# Patient Record
Sex: FEMALE | Race: WHITE | Employment: FULL TIME | URBAN - METROPOLITAN AREA
[De-identification: names, ages, dates, MRNs, and addresses within clinical notes are randomized per-mention and may not be internally consistent; named-entity substitution may affect disease eponyms.]

---

## 2017-01-18 ENCOUNTER — OFFICE VISIT (OUTPATIENT)
Dept: FAMILY MEDICINE CLINIC | Age: 57
End: 2017-01-18

## 2017-01-18 VITALS
HEIGHT: 69 IN | TEMPERATURE: 97.8 F | BODY MASS INDEX: 38.78 KG/M2 | WEIGHT: 261.8 LBS | DIASTOLIC BLOOD PRESSURE: 84 MMHG | HEART RATE: 68 BPM | SYSTOLIC BLOOD PRESSURE: 136 MMHG

## 2017-01-18 DIAGNOSIS — R10.30 LOWER ABDOMINAL PAIN: Primary | ICD-10-CM

## 2017-01-18 LAB
BILIRUBIN, POC: NORMAL
BLOOD URINE, POC: NORMAL
CLARITY, POC: CLEAR
COLOR, POC: YELLOW
GLUCOSE URINE, POC: NORMAL
KETONES, POC: NORMAL
LEUKOCYTE EST, POC: NORMAL
NITRITE, POC: NORMAL
PH, POC: 7
PROTEIN, POC: NORMAL
SPECIFIC GRAVITY, POC: 1.01
UROBILINOGEN, POC: 0.2

## 2017-01-18 PROCEDURE — 99213 OFFICE O/P EST LOW 20 MIN: CPT | Performed by: FAMILY MEDICINE

## 2017-01-18 PROCEDURE — 81002 URINALYSIS NONAUTO W/O SCOPE: CPT | Performed by: FAMILY MEDICINE

## 2017-01-18 RX ORDER — AMOXICILLIN AND CLAVULANATE POTASSIUM 875; 125 MG/1; MG/1
1 TABLET, FILM COATED ORAL 2 TIMES DAILY
Qty: 20 TABLET | Refills: 0 | Status: SHIPPED | OUTPATIENT
Start: 2017-01-18 | End: 2017-02-02 | Stop reason: SDUPTHER

## 2017-01-18 ASSESSMENT — ENCOUNTER SYMPTOMS
ABDOMINAL PAIN: 1
SHORTNESS OF BREATH: 0
EYE PAIN: 0
VOMITING: 0
NAUSEA: 0

## 2017-01-24 ENCOUNTER — TELEPHONE (OUTPATIENT)
Dept: FAMILY MEDICINE CLINIC | Age: 57
End: 2017-01-24

## 2017-02-02 ENCOUNTER — OFFICE VISIT (OUTPATIENT)
Dept: FAMILY MEDICINE CLINIC | Age: 57
End: 2017-02-02

## 2017-02-02 VITALS
HEIGHT: 69 IN | HEART RATE: 76 BPM | TEMPERATURE: 97.8 F | SYSTOLIC BLOOD PRESSURE: 132 MMHG | WEIGHT: 257.2 LBS | BODY MASS INDEX: 38.09 KG/M2 | DIASTOLIC BLOOD PRESSURE: 82 MMHG

## 2017-02-02 DIAGNOSIS — K57.32 DIVERTICULITIS OF LARGE INTESTINE WITHOUT PERFORATION OR ABSCESS WITHOUT BLEEDING: Primary | ICD-10-CM

## 2017-02-02 PROCEDURE — 99212 OFFICE O/P EST SF 10 MIN: CPT | Performed by: FAMILY MEDICINE

## 2017-02-02 RX ORDER — AMOXICILLIN AND CLAVULANATE POTASSIUM 875; 125 MG/1; MG/1
1 TABLET, FILM COATED ORAL 2 TIMES DAILY
Qty: 20 TABLET | Refills: 0 | Status: SHIPPED | OUTPATIENT
Start: 2017-02-02 | End: 2017-02-12

## 2017-02-02 ASSESSMENT — ENCOUNTER SYMPTOMS
VOMITING: 0
ABDOMINAL PAIN: 0
EYE PAIN: 0
SHORTNESS OF BREATH: 0
NAUSEA: 0

## 2017-03-20 RX ORDER — LISINOPRIL AND HYDROCHLOROTHIAZIDE 12.5; 1 MG/1; MG/1
TABLET ORAL
Qty: 180 TABLET | Refills: 0 | Status: SHIPPED | OUTPATIENT
Start: 2017-03-20 | End: 2017-06-16 | Stop reason: SDUPTHER

## 2017-04-19 ENCOUNTER — TELEPHONE (OUTPATIENT)
Dept: FAMILY MEDICINE CLINIC | Age: 57
End: 2017-04-19

## 2017-04-25 ENCOUNTER — OFFICE VISIT (OUTPATIENT)
Dept: FAMILY MEDICINE CLINIC | Age: 57
End: 2017-04-25

## 2017-04-25 ENCOUNTER — HOSPITAL ENCOUNTER (OUTPATIENT)
Dept: NON INVASIVE DIAGNOSTICS | Age: 57
Discharge: HOME | End: 2017-04-25
Attending: FAMILY MEDICINE | Admitting: FAMILY MEDICINE

## 2017-04-25 VITALS
HEART RATE: 72 BPM | SYSTOLIC BLOOD PRESSURE: 126 MMHG | TEMPERATURE: 98.4 F | BODY MASS INDEX: 38.36 KG/M2 | DIASTOLIC BLOOD PRESSURE: 80 MMHG | HEIGHT: 69 IN | WEIGHT: 259 LBS

## 2017-04-25 DIAGNOSIS — R05.9 COUGH: ICD-10-CM

## 2017-04-25 DIAGNOSIS — J40 BRONCHITIS: Primary | ICD-10-CM

## 2017-04-25 DIAGNOSIS — R50.9 FEELS FEVERISH: ICD-10-CM

## 2017-04-25 LAB
BASOPHILS ABSOLUTE: 0.1 K/UL (ref 0–0.2)
BASOPHILS RELATIVE PERCENT: 1.1 %
EOSINOPHILS ABSOLUTE: 0.2 K/UL (ref 0–0.6)
EOSINOPHILS RELATIVE PERCENT: 2 %
HCT VFR BLD CALC: 43.6 % (ref 36–48)
HEMOGLOBIN: 14.4 G/DL (ref 12–16)
LYMPHOCYTES ABSOLUTE: 3 K/UL (ref 1–5.1)
LYMPHOCYTES RELATIVE PERCENT: 27.6 %
MCH RBC QN AUTO: 29.8 PG (ref 26–34)
MCHC RBC AUTO-ENTMCNC: 33.1 G/DL (ref 31–36)
MCV RBC AUTO: 90 FL (ref 80–100)
MONOCYTES ABSOLUTE: 0.6 K/UL (ref 0–1.3)
MONOCYTES RELATIVE PERCENT: 5.6 %
NEUTROPHILS ABSOLUTE: 7 K/UL (ref 1.7–7.7)
NEUTROPHILS RELATIVE PERCENT: 63.7 %
PDW BLD-RTO: 14 % (ref 12.4–15.4)
PLATELET # BLD: 341 K/UL (ref 135–450)
PMV BLD AUTO: 8.1 FL (ref 5–10.5)
RBC # BLD: 4.84 M/UL (ref 4–5.2)
WBC # BLD: 11 K/UL (ref 4–11)

## 2017-04-25 PROCEDURE — 99213 OFFICE O/P EST LOW 20 MIN: CPT | Performed by: FAMILY MEDICINE

## 2017-04-25 ASSESSMENT — ENCOUNTER SYMPTOMS
COUGH: 0
WHEEZING: 0
RHINORRHEA: 1
CHEST TIGHTNESS: 0
SHORTNESS OF BREATH: 0
SINUS PRESSURE: 1

## 2017-04-26 LAB — SEDIMENTATION RATE, ERYTHROCYTE: 23 MM/HR (ref 0–30)

## 2017-04-27 DIAGNOSIS — R05.9 COUGH: ICD-10-CM

## 2017-04-27 DIAGNOSIS — R50.9 FEVER, UNSPECIFIED FEVER CAUSE: Primary | ICD-10-CM

## 2017-05-10 DIAGNOSIS — R05.9 COUGH: ICD-10-CM

## 2017-05-10 DIAGNOSIS — R50.9 FEVER, UNSPECIFIED FEVER CAUSE: ICD-10-CM

## 2017-05-15 LAB — MISCELLANEOUS LAB TEST ORDER: NORMAL

## 2017-07-26 ENCOUNTER — HOSPITAL ENCOUNTER (OUTPATIENT)
Dept: WOMENS IMAGING | Age: 57
Discharge: HOME | End: 2017-07-26
Attending: OBSTETRICS & GYNECOLOGY | Admitting: OBSTETRICS & GYNECOLOGY

## 2017-07-26 DIAGNOSIS — Z12.31 VISIT FOR SCREENING MAMMOGRAM: ICD-10-CM

## 2017-09-21 ENCOUNTER — TELEPHONE (OUTPATIENT)
Dept: FAMILY MEDICINE CLINIC | Age: 57
End: 2017-09-21

## 2017-09-21 DIAGNOSIS — R73.9 HYPERGLYCEMIA: ICD-10-CM

## 2017-09-21 DIAGNOSIS — E78.5 HYPERLIPIDEMIA, UNSPECIFIED HYPERLIPIDEMIA TYPE: ICD-10-CM

## 2017-09-21 DIAGNOSIS — I10 ESSENTIAL HYPERTENSION: Primary | ICD-10-CM

## 2017-11-14 DIAGNOSIS — E78.5 HYPERLIPIDEMIA, UNSPECIFIED HYPERLIPIDEMIA TYPE: ICD-10-CM

## 2017-11-14 DIAGNOSIS — R73.9 HYPERGLYCEMIA: ICD-10-CM

## 2017-11-14 DIAGNOSIS — I10 ESSENTIAL HYPERTENSION: ICD-10-CM

## 2017-11-14 LAB
ANION GAP SERPL CALCULATED.3IONS-SCNC: 13 MMOL/L (ref 3–16)
BUN BLDV-MCNC: 14 MG/DL (ref 7–20)
CALCIUM SERPL-MCNC: 9.7 MG/DL (ref 8.3–10.6)
CHLORIDE BLD-SCNC: 103 MMOL/L (ref 99–110)
CHOLESTEROL, TOTAL: 203 MG/DL (ref 0–199)
CO2: 25 MMOL/L (ref 21–32)
CREAT SERPL-MCNC: 0.6 MG/DL (ref 0.6–1.1)
GFR AFRICAN AMERICAN: >60
GFR NON-AFRICAN AMERICAN: >60
GLUCOSE BLD-MCNC: 105 MG/DL (ref 70–99)
HDLC SERPL-MCNC: 46 MG/DL (ref 40–60)
LDL CHOLESTEROL CALCULATED: 138 MG/DL
POTASSIUM SERPL-SCNC: 4.3 MMOL/L (ref 3.5–5.1)
SODIUM BLD-SCNC: 141 MMOL/L (ref 136–145)
TRIGL SERPL-MCNC: 93 MG/DL (ref 0–150)
VLDLC SERPL CALC-MCNC: 19 MG/DL

## 2017-11-15 LAB
ESTIMATED AVERAGE GLUCOSE: 99.7 MG/DL
HBA1C MFR BLD: 5.1 %

## 2017-11-20 RX ORDER — FUROSEMIDE 20 MG/1
TABLET ORAL
Qty: 30 TABLET | Refills: 2 | Status: SHIPPED | OUTPATIENT
Start: 2017-11-20 | End: 2018-03-07 | Stop reason: SDUPTHER

## 2017-12-06 ENCOUNTER — OFFICE VISIT (OUTPATIENT)
Dept: FAMILY MEDICINE CLINIC | Age: 57
End: 2017-12-06

## 2017-12-06 VITALS
SYSTOLIC BLOOD PRESSURE: 130 MMHG | HEART RATE: 64 BPM | DIASTOLIC BLOOD PRESSURE: 84 MMHG | WEIGHT: 257.2 LBS | TEMPERATURE: 98 F | BODY MASS INDEX: 38.09 KG/M2 | HEIGHT: 69 IN

## 2017-12-06 DIAGNOSIS — Z00.00 ROUTINE GENERAL MEDICAL EXAMINATION AT A HEALTH CARE FACILITY: Primary | ICD-10-CM

## 2017-12-06 DIAGNOSIS — Z11.59 NEED FOR HEPATITIS C SCREENING TEST: ICD-10-CM

## 2017-12-06 DIAGNOSIS — E78.2 MIXED HYPERLIPIDEMIA: ICD-10-CM

## 2017-12-06 DIAGNOSIS — Z11.4 ENCOUNTER FOR SCREENING FOR HIV: ICD-10-CM

## 2017-12-06 DIAGNOSIS — I10 ESSENTIAL HYPERTENSION: ICD-10-CM

## 2017-12-06 DIAGNOSIS — E03.4 HYPOTHYROIDISM DUE TO ACQUIRED ATROPHY OF THYROID: ICD-10-CM

## 2017-12-06 PROCEDURE — 99396 PREV VISIT EST AGE 40-64: CPT | Performed by: FAMILY MEDICINE

## 2017-12-06 PROCEDURE — 93000 ELECTROCARDIOGRAM COMPLETE: CPT | Performed by: FAMILY MEDICINE

## 2017-12-06 RX ORDER — LISINOPRIL AND HYDROCHLOROTHIAZIDE 12.5; 1 MG/1; MG/1
TABLET ORAL
Qty: 180 TABLET | Refills: 3 | Status: SHIPPED | OUTPATIENT
Start: 2017-12-06 | End: 2018-12-05 | Stop reason: SDUPTHER

## 2017-12-06 ASSESSMENT — ENCOUNTER SYMPTOMS
NAUSEA: 0
DIARRHEA: 0
CHEST TIGHTNESS: 0
VOMITING: 0
EYE DISCHARGE: 0
EYE PAIN: 0
ABDOMINAL PAIN: 0
SINUS PRESSURE: 0
RHINORRHEA: 0
CONSTIPATION: 0
BLOOD IN STOOL: 0
WHEEZING: 0
EYE REDNESS: 0
COUGH: 0
SHORTNESS OF BREATH: 0

## 2017-12-06 ASSESSMENT — PATIENT HEALTH QUESTIONNAIRE - PHQ9
SUM OF ALL RESPONSES TO PHQ QUESTIONS 1-9: 0
1. LITTLE INTEREST OR PLEASURE IN DOING THINGS: 0
SUM OF ALL RESPONSES TO PHQ9 QUESTIONS 1 & 2: 0
2. FEELING DOWN, DEPRESSED OR HOPELESS: 0

## 2017-12-06 NOTE — PROGRESS NOTES
Subjective:      Patient ID: Christine Philippe is a 62 y.o. female. HPI  Chief Complaint   Patient presents with    Annual Exam     no new problems     Here for CPE. Non smoker. utd on dental and vision exams  Sees gyn yrly. Had mammo  Takes meds daily  Getting spider veins treated  States little exercise.    States at times her heart flutters- hasn't had for 1 month, happened when she was overworked  Denies CP,SOB or syncope    Christine Philippe is a 62 y.o. female with the following history as recorded in [employer]:  Patient Active Problem List    Diagnosis Date Noted    Acute non-recurrent frontal sinusitis 07/29/2016    Cellulitis of finger of right hand 07/29/2016    HLD (hyperlipidemia) 09/24/2015    Routine general medical examination at a health care facility 09/24/2015    Bilateral leg edema 09/24/2015    Chronic venous hypertension with inflammation involving both sides 01/30/2015    Chronic venous insufficiency 01/30/2015    Swelling 01/30/2015    Menorrhagia 11/07/2011    Unilateral complete paralysis of vocal cord 09/01/2011    Dyspnea and respiratory abnormality 09/01/2011    Thyroid mass     GERD (gastroesophageal reflux disease)     Hypothyroidism     Allergic rhinitis     Hypertension     Dysuria 07/20/2010    Urinary tract infection 03/02/2010    Atopic rhinitis 02/04/2010    Skin disorder 07/13/2009    Perichondritis of pinna 07/13/2009    Rash 06/09/2009    Cough 05/02/2009    Disorder of function of stomach 04/22/2009    Benign neoplasm of thyroid gland 08/14/2007    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  Vitamin D (CHOLECALCIFEROL) 1000 UNITS CAPS capsule Take 1,000 Units by mouth daily. No current facility-administered medications for this visit. Allergies: Sulfa antibiotics  Past Medical History:   Diagnosis Date    Allergic rhinitis     Chronic allergic rhinitis     Dyspepsia     Hypertension     Hypothyroidism     Thyroid mass      Past Surgical History:   Procedure Laterality Date    COLONOSCOPY  2009    THROAT SURGERY  1995    VOCAL CORD SURGERY    THYROID SURGERY  7536/7032    VASCULAR SURGERY Bilateral      Family History   Problem Relation Age of Onset    Cancer Paternal Uncle      colon/liver    Cancer Maternal Grandmother      cervical    Cancer Maternal Grandfather      colon     Social History   Substance Use Topics    Smoking status: Never Smoker    Smokeless tobacco: Never Used    Alcohol use Yes      Comment: social     Vitals:    12/06/17 1029   BP: 130/84   Pulse: 64   Temp: 98 °F (36.7 °C)   TempSrc: Oral   Weight: 257 lb 3.2 oz (116.7 kg)   Height: 5' 9\" (1.753 m)     Body mass index is 37.98 kg/m².      Wt Readings from Last 3 Encounters:   12/06/17 257 lb 3.2 oz (116.7 kg)   04/25/17 259 lb (117.5 kg)   02/02/17 257 lb 3.2 oz (116.7 kg)     BP Readings from Last 3 Encounters:   12/06/17 130/84   04/25/17 126/80   02/02/17 132/82      Lab Review   Orders Only on 11/14/2017   Component Date Value    Cholesterol, Total 11/14/2017 203*    Triglycerides 11/14/2017 93     HDL 11/14/2017 46     LDL Calculated 11/14/2017 138*    VLDL CHOLESTEROL CALCULA* 11/14/2017 19     Sodium 11/14/2017 141     Potassium 11/14/2017 4.3     Chloride 11/14/2017 103     CO2 11/14/2017 25     Anion Gap 11/14/2017 13     Glucose 11/14/2017 105*    BUN 11/14/2017 14     CREATININE 11/14/2017 0.6     GFR Non- 11/14/2017 >60     GFR Procedures    HEPATITIS C ANTIBODY     Standing Status:   Future     Standing Expiration Date:   12/6/2018    HIV-1 AND HIV-2 ANTIBODIES     Standing Status:   Future     Standing Expiration Date:   12/6/2018    EKG 12 Lead     Standing Status:   Future     Number of Occurrences:   1     Standing Expiration Date:   12/6/2018     Order Specific Question:   Reason for Exam?     Answer:   Hypertension     Orders Placed This Encounter   Medications    lisinopril-hydrochlorothiazide (PRINZIDE;ZESTORETIC) 10-12.5 MG per tablet     Sig: TAKE TWO TABLETS BY MOUTH DAILY     Dispense:  180 tablet     Refill:  3

## 2017-12-06 NOTE — PATIENT INSTRUCTIONS
Please call your pharmacy if you need any refills of your medication(s). Please call our office at [phone] if you don't hear from us about your test results. Bring an accurate list of your medications with you at every appointment to ensure that we have the correct information. Our office hours are: Monday - Friday 7 am- 5 pm; Saturdays vary on doctor working.     Phone lines turn on at 8 am.

## 2018-01-11 ENCOUNTER — TELEPHONE (OUTPATIENT)
Dept: INTERNAL MEDICINE CLINIC | Age: 58
End: 2018-01-11

## 2018-01-11 NOTE — TELEPHONE ENCOUNTER
Pt was established w/ Dr Ronnie Castillo and since Dr Ronnie Castillo has moved to the [facility] AT [facility] pt wants to know if Dr Ky Bang will take her on as a new pt since her Belkys Mathews ([id number]) is currently established w/ Dr Ky Bang. Pl advise.

## 2018-01-12 ENCOUNTER — OFFICE VISIT (OUTPATIENT)
Dept: INTERNAL MEDICINE CLINIC | Age: 58
End: 2018-01-12

## 2018-01-12 VITALS
OXYGEN SATURATION: 97 % | SYSTOLIC BLOOD PRESSURE: 116 MMHG | HEART RATE: 74 BPM | DIASTOLIC BLOOD PRESSURE: 72 MMHG | BODY MASS INDEX: 38.1 KG/M2 | WEIGHT: 258 LBS | TEMPERATURE: 98.7 F

## 2018-01-12 DIAGNOSIS — K21.9 GASTROESOPHAGEAL REFLUX DISEASE WITHOUT ESOPHAGITIS: ICD-10-CM

## 2018-01-12 DIAGNOSIS — R60.0 BILATERAL LEG EDEMA: ICD-10-CM

## 2018-01-12 DIAGNOSIS — J01.10 ACUTE NON-RECURRENT FRONTAL SINUSITIS: Primary | ICD-10-CM

## 2018-01-12 PROCEDURE — 99214 OFFICE O/P EST MOD 30 MIN: CPT | Performed by: INTERNAL MEDICINE

## 2018-01-12 RX ORDER — LANSOPRAZOLE 15 MG/1
15 CAPSULE, DELAYED RELEASE ORAL DAILY
COMMUNITY

## 2018-01-12 RX ORDER — LEVOFLOXACIN 500 MG/1
500 TABLET, FILM COATED ORAL DAILY
Qty: 10 TABLET | Refills: 0 | Status: SHIPPED | OUTPATIENT
Start: 2018-01-12 | End: 2018-01-19

## 2018-01-12 RX ORDER — METHYLPREDNISOLONE 4 MG/1
TABLET ORAL
Qty: 1 KIT | Refills: 0 | Status: SHIPPED | OUTPATIENT
Start: 2018-01-12 | End: 2018-01-18

## 2018-01-12 ASSESSMENT — ENCOUNTER SYMPTOMS
SORE THROAT: 0
GASTROINTESTINAL NEGATIVE: 1
SINUS PRESSURE: 1
SWOLLEN GLANDS: 0
SHORTNESS OF BREATH: 0
COUGH: 1

## 2018-01-12 NOTE — PROGRESS NOTES
Rfl: 3  furosemide (LASIX) 20 MG tablet, TAKE ONE TABLET BY MOUTH DAILY, Disp: 30 tablet, Rfl: 2  SYNTHROID 137 MCG tablet, Take 137 mcg by mouth daily , Disp: , Rfl:   Cyanocobalamin (VITAMIN B 12 PO), Take  by mouth., Disp: , Rfl:   Vitamin D (CHOLECALCIFEROL) 1000 UNITS CAPS capsule, Take 1,000 Units by mouth daily. , Disp: , Rfl:     No current facility-administered medications for this visit. Allergies: Sulfa antibiotics  Past Medical History:  No date: Allergic rhinitis  No date: Chronic allergic rhinitis  No date: Dyspepsia  No date: Hypertension  No date: Hypothyroidism  No date: Thyroid mass  Past Surgical History:  2009: COLONOSCOPY  1995: THROAT SURGERY      Comment: VOCAL CORD SURGERY  [id number]: THYROID SURGERY  No date: VASCULAR SURGERY Bilateral  Review of patient's family history indicates:    Cancer                         Paternal Uncle              Comment: colon/liver    Cancer                         Maternal Grandmother        Comment: cervical    Cancer                         Maternal Grandfather        Comment: colon    Smoking status: Never Smoker                                                              Smokeless tobacco: Never Used                      Alcohol use: Yes              Comment: social        Sinusitis   This is a new problem. The current episode started 1 to 4 weeks ago. The problem is unchanged. There has been no fever. The pain is mild (pressure. ). Associated symptoms include congestion, coughing and sinus pressure. Pertinent negatives include no chills, diaphoresis, shortness of breath, sore throat or swollen glands. Treatments tried: augmentin but not helped. The treatment provided no relief. Review of Systems   Constitutional: Positive for fatigue. Negative for chills and diaphoresis. HENT: Positive for congestion and sinus pressure. Negative for sore throat. Respiratory: Positive for cough. Negative for shortness of breath. Cardiovascular: Negative.

## 2018-01-19 ENCOUNTER — OFFICE VISIT (OUTPATIENT)
Dept: INTERNAL MEDICINE CLINIC | Age: 58
End: 2018-01-19

## 2018-01-19 VITALS
DIASTOLIC BLOOD PRESSURE: 76 MMHG | TEMPERATURE: 98.6 F | SYSTOLIC BLOOD PRESSURE: 120 MMHG | HEART RATE: 76 BPM | WEIGHT: 258 LBS | BODY MASS INDEX: 38.1 KG/M2

## 2018-01-19 DIAGNOSIS — E78.00 PURE HYPERCHOLESTEROLEMIA: ICD-10-CM

## 2018-01-19 DIAGNOSIS — K57.30 DIVERTICULA OF COLON: Primary | ICD-10-CM

## 2018-01-19 PROCEDURE — 99215 OFFICE O/P EST HI 40 MIN: CPT | Performed by: INTERNAL MEDICINE

## 2018-01-19 RX ORDER — METRONIDAZOLE 500 MG/1
500 TABLET ORAL 3 TIMES DAILY
Qty: 30 TABLET | Refills: 0 | Status: SHIPPED | OUTPATIENT
Start: 2018-01-19 | End: 2018-04-24 | Stop reason: SDUPTHER

## 2018-01-19 RX ORDER — CIPROFLOXACIN 500 MG/1
500 TABLET, FILM COATED ORAL 2 TIMES DAILY
Qty: 20 TABLET | Refills: 0 | Status: SHIPPED | OUTPATIENT
Start: 2018-01-19 | End: 2018-01-29

## 2018-01-19 ASSESSMENT — ENCOUNTER SYMPTOMS
NAUSEA: 0
RESPIRATORY NEGATIVE: 1
ABDOMINAL PAIN: 1
VOMITING: 0
DIARRHEA: 0

## 2018-03-07 ENCOUNTER — PATIENT MESSAGE (OUTPATIENT)
Dept: INTERNAL MEDICINE CLINIC | Age: 58
End: 2018-03-07

## 2018-03-07 RX ORDER — FUROSEMIDE 20 MG/1
20 TABLET ORAL DAILY
Qty: 30 TABLET | Refills: 0 | Status: SHIPPED | OUTPATIENT
Start: 2018-03-07 | End: 2018-04-03 | Stop reason: SDUPTHER

## 2018-04-03 RX ORDER — FUROSEMIDE 20 MG/1
20 TABLET ORAL DAILY
Qty: 90 TABLET | Refills: 1 | Status: SHIPPED | OUTPATIENT
Start: 2018-04-03 | End: 2018-10-05 | Stop reason: SDUPTHER

## 2018-04-24 ENCOUNTER — OFFICE VISIT (OUTPATIENT)
Dept: INTERNAL MEDICINE CLINIC | Age: 58
End: 2018-04-24

## 2018-04-24 VITALS
HEART RATE: 65 BPM | OXYGEN SATURATION: 97 % | WEIGHT: 259 LBS | DIASTOLIC BLOOD PRESSURE: 80 MMHG | TEMPERATURE: 98.1 F | BODY MASS INDEX: 38.25 KG/M2 | SYSTOLIC BLOOD PRESSURE: 132 MMHG

## 2018-04-24 DIAGNOSIS — K57.30 DIVERTICULA OF COLON: ICD-10-CM

## 2018-04-24 DIAGNOSIS — I10 ESSENTIAL HYPERTENSION: ICD-10-CM

## 2018-04-24 DIAGNOSIS — K21.00 GASTROESOPHAGEAL REFLUX DISEASE WITH ESOPHAGITIS: Primary | ICD-10-CM

## 2018-04-24 DIAGNOSIS — R60.9 SWELLING: ICD-10-CM

## 2018-04-24 PROCEDURE — 99214 OFFICE O/P EST MOD 30 MIN: CPT | Performed by: INTERNAL MEDICINE

## 2018-04-24 RX ORDER — CIPROFLOXACIN 500 MG/1
500 TABLET, FILM COATED ORAL 2 TIMES DAILY
Qty: 20 TABLET | Refills: 0 | Status: SHIPPED | OUTPATIENT
Start: 2018-04-24 | End: 2018-05-04

## 2018-04-24 RX ORDER — METRONIDAZOLE 500 MG/1
500 TABLET ORAL 3 TIMES DAILY
Qty: 30 TABLET | Refills: 0 | Status: SHIPPED | OUTPATIENT
Start: 2018-04-24 | End: 2018-05-04

## 2018-04-24 ASSESSMENT — ENCOUNTER SYMPTOMS
RESPIRATORY NEGATIVE: 1
CONSTIPATION: 1
CRAMPS: 1

## 2018-06-22 ENCOUNTER — OFFICE VISIT (OUTPATIENT)
Dept: INTERNAL MEDICINE CLINIC | Age: 58
End: 2018-06-22

## 2018-06-22 VITALS
OXYGEN SATURATION: 99 % | TEMPERATURE: 97.4 F | SYSTOLIC BLOOD PRESSURE: 116 MMHG | BODY MASS INDEX: 37.66 KG/M2 | DIASTOLIC BLOOD PRESSURE: 68 MMHG | HEART RATE: 79 BPM | WEIGHT: 255 LBS

## 2018-06-22 DIAGNOSIS — J45.909 ALLERGIC BRONCHITIS WITHOUT COMPLICATION: ICD-10-CM

## 2018-06-22 DIAGNOSIS — I10 ESSENTIAL HYPERTENSION: ICD-10-CM

## 2018-06-22 DIAGNOSIS — J06.9 ACUTE URI: Primary | ICD-10-CM

## 2018-06-22 DIAGNOSIS — K21.00 GASTROESOPHAGEAL REFLUX DISEASE WITH ESOPHAGITIS: ICD-10-CM

## 2018-06-22 PROCEDURE — 99214 OFFICE O/P EST MOD 30 MIN: CPT | Performed by: INTERNAL MEDICINE

## 2018-06-22 RX ORDER — AZITHROMYCIN 250 MG/1
TABLET, FILM COATED ORAL
Qty: 6 TABLET | Refills: 0 | Status: SHIPPED | OUTPATIENT
Start: 2018-06-22 | End: 2018-07-02

## 2018-06-22 RX ORDER — METHYLPREDNISOLONE 4 MG/1
TABLET ORAL
Qty: 1 KIT | Refills: 0 | Status: SHIPPED | OUTPATIENT
Start: 2018-06-22 | End: 2018-06-28

## 2018-06-22 RX ORDER — GUAIFENESIN AND CODEINE PHOSPHATE 100; 10 MG/5ML; MG/5ML
10 SOLUTION ORAL 3 TIMES DAILY PRN
Qty: 120 ML | Refills: 0 | Status: SHIPPED | OUTPATIENT
Start: 2018-06-22 | End: 2018-06-29

## 2018-06-22 ASSESSMENT — ENCOUNTER SYMPTOMS
APNEA: 0
SINUS PAIN: 1
GASTROINTESTINAL NEGATIVE: 1
CHOKING: 0
BACK PAIN: 0
SORE THROAT: 1
EYES NEGATIVE: 1
SINUS PRESSURE: 0
STRIDOR: 0
VOICE CHANGE: 1
WHEEZING: 0
COUGH: 1
COLOR CHANGE: 0

## 2018-09-26 ENCOUNTER — HOSPITAL ENCOUNTER (OUTPATIENT)
Dept: WOMENS IMAGING | Age: 58
Discharge: HOME | End: 2018-09-26
Payer: COMMERCIAL

## 2018-09-26 DIAGNOSIS — Z12.31 VISIT FOR SCREENING MAMMOGRAM: ICD-10-CM

## 2018-09-26 PROCEDURE — 77063 BREAST TOMOSYNTHESIS BI: CPT

## 2018-10-08 RX ORDER — FUROSEMIDE 20 MG/1
TABLET ORAL
Qty: 30 TABLET | Refills: 0 | Status: SHIPPED | OUTPATIENT
Start: 2018-10-08 | End: 2018-11-06 | Stop reason: SDUPTHER

## 2018-11-06 ENCOUNTER — OFFICE VISIT (OUTPATIENT)
Dept: INTERNAL MEDICINE CLINIC | Age: 58
End: 2018-11-06
Payer: COMMERCIAL

## 2018-11-06 VITALS
HEART RATE: 70 BPM | BODY MASS INDEX: 38.47 KG/M2 | DIASTOLIC BLOOD PRESSURE: 76 MMHG | TEMPERATURE: 98.2 F | OXYGEN SATURATION: 98 % | SYSTOLIC BLOOD PRESSURE: 134 MMHG | WEIGHT: 260.5 LBS

## 2018-11-06 DIAGNOSIS — R60.9 SWELLING: ICD-10-CM

## 2018-11-06 DIAGNOSIS — J06.9 ACUTE URI: Primary | ICD-10-CM

## 2018-11-06 DIAGNOSIS — K21.00 GASTROESOPHAGEAL REFLUX DISEASE WITH ESOPHAGITIS: ICD-10-CM

## 2018-11-06 DIAGNOSIS — H66.90 ACUTE OTITIS MEDIA, UNSPECIFIED OTITIS MEDIA TYPE: ICD-10-CM

## 2018-11-06 PROCEDURE — 99214 OFFICE O/P EST MOD 30 MIN: CPT | Performed by: INTERNAL MEDICINE

## 2018-11-06 RX ORDER — FUROSEMIDE 20 MG/1
20 TABLET ORAL DAILY
Qty: 30 TABLET | Refills: 3 | Status: SHIPPED | OUTPATIENT
Start: 2018-11-06 | End: 2019-03-06 | Stop reason: SDUPTHER

## 2018-11-06 RX ORDER — HYDROCODONE POLISTIREX AND CHLORPHENIRAMINE POLISTIREX 10; 8 MG/5ML; MG/5ML
5 SUSPENSION, EXTENDED RELEASE ORAL EVERY 12 HOURS PRN
Qty: 120 ML | Refills: 0 | Status: SHIPPED | OUTPATIENT
Start: 2018-11-06 | End: 2018-11-16

## 2018-11-06 RX ORDER — AZITHROMYCIN 250 MG/1
TABLET, FILM COATED ORAL
Qty: 6 TABLET | Refills: 0 | Status: SHIPPED | OUTPATIENT
Start: 2018-11-06 | End: 2018-11-16

## 2018-11-06 ASSESSMENT — PATIENT HEALTH QUESTIONNAIRE - PHQ9
SUM OF ALL RESPONSES TO PHQ QUESTIONS 1-9: 0
1. LITTLE INTEREST OR PLEASURE IN DOING THINGS: 0
SUM OF ALL RESPONSES TO PHQ9 QUESTIONS 1 & 2: 0
2. FEELING DOWN, DEPRESSED OR HOPELESS: 0
SUM OF ALL RESPONSES TO PHQ QUESTIONS 1-9: 0

## 2018-11-06 ASSESSMENT — ENCOUNTER SYMPTOMS
GASTROINTESTINAL NEGATIVE: 1
COUGH: 1
SORE THROAT: 1

## 2018-12-06 RX ORDER — LISINOPRIL AND HYDROCHLOROTHIAZIDE 12.5; 1 MG/1; MG/1
TABLET ORAL
Qty: 60 TABLET | Refills: 2 | Status: SHIPPED | OUTPATIENT
Start: 2018-12-06 | End: 2019-03-06 | Stop reason: SDUPTHER

## 2018-12-14 ENCOUNTER — OFFICE VISIT (OUTPATIENT)
Dept: INTERNAL MEDICINE CLINIC | Age: 58
End: 2018-12-14
Payer: COMMERCIAL

## 2018-12-14 ENCOUNTER — HOSPITAL ENCOUNTER (OUTPATIENT)
Dept: GENERAL RADIOLOGY | Age: 58
Discharge: HOME | End: 2018-12-14
Payer: COMMERCIAL

## 2018-12-14 ENCOUNTER — HOSPITAL ENCOUNTER (OUTPATIENT)
Age: 58
Discharge: HOME | End: 2018-12-14
Payer: COMMERCIAL

## 2018-12-14 VITALS
WEIGHT: 261 LBS | TEMPERATURE: 98.3 F | OXYGEN SATURATION: 97 % | SYSTOLIC BLOOD PRESSURE: 128 MMHG | BODY MASS INDEX: 38.54 KG/M2 | DIASTOLIC BLOOD PRESSURE: 80 MMHG | HEART RATE: 75 BPM

## 2018-12-14 DIAGNOSIS — K21.00 GASTROESOPHAGEAL REFLUX DISEASE WITH ESOPHAGITIS: ICD-10-CM

## 2018-12-14 DIAGNOSIS — J30.9 ALLERGIC RHINITIS, UNSPECIFIED SEASONALITY, UNSPECIFIED TRIGGER: ICD-10-CM

## 2018-12-14 DIAGNOSIS — D43.9: ICD-10-CM

## 2018-12-14 DIAGNOSIS — D44.9: ICD-10-CM

## 2018-12-14 DIAGNOSIS — R05.8 ALLERGIC COUGH: ICD-10-CM

## 2018-12-14 DIAGNOSIS — I87.323 CHRONIC VENOUS HYPERTENSION WITH INFLAMMATION INVOLVING BOTH SIDES: ICD-10-CM

## 2018-12-14 DIAGNOSIS — R05.8 ALLERGIC COUGH: Primary | ICD-10-CM

## 2018-12-14 PROCEDURE — 99214 OFFICE O/P EST MOD 30 MIN: CPT | Performed by: INTERNAL MEDICINE

## 2018-12-14 PROCEDURE — 71046 X-RAY EXAM CHEST 2 VIEWS: CPT

## 2018-12-14 RX ORDER — METHYLPREDNISOLONE 4 MG/1
TABLET ORAL
Qty: 1 KIT | Refills: 0 | Status: SHIPPED | OUTPATIENT
Start: 2018-12-14 | End: 2018-12-20

## 2018-12-14 RX ORDER — LEVOFLOXACIN 500 MG/1
500 TABLET, FILM COATED ORAL DAILY
Qty: 10 TABLET | Refills: 0 | Status: SHIPPED | OUTPATIENT
Start: 2018-12-14 | End: 2018-12-24

## 2018-12-14 ASSESSMENT — ENCOUNTER SYMPTOMS
COUGH: 1
SINUS PRESSURE: 1
HOARSE VOICE: 1
SHORTNESS OF BREATH: 0

## 2018-12-14 NOTE — PROGRESS NOTES
Subjective:      Patient ID: Shelley Cerna is a 62 y.o. female. Patient presents with:  Sinusitis: sinus pain and pressure, ear pain, dry cough. sick in october, never really went away.      Shelley Cerna is a 62 y.o. female [facility] the [facility]:  Patient Active Problem List    Acute non-recurrent frontal sinusitis         Date Noted: 07/29/2016      Cellulitis of finger of right hand         Date Noted: 07/29/2016      HLD (hyperlipidemia)         Date Noted: 09/24/2015      Bilateral leg edema         Date Noted: 09/24/2015      Chronic venous hypertension with inflammation involving both sides         Date Noted: 01/30/2015      Chronic venous insufficiency         Date Noted: 01/30/2015      Swelling         Date Noted: 01/30/2015      Menorrhagia         Date Noted: 11/07/2011      Unilateral complete paralysis of vocal cord         Date Noted: 09/01/2011      Dyspnea and respiratory abnormality         Date Noted: 09/01/2011      Thyroid mass      GERD (gastroesophageal reflux disease)      Hypothyroidism      Allergic rhinitis      Hypertension      Dysuria         Date Noted: 07/20/2010      Atopic rhinitis         Date Noted: 02/04/2010      Skin disorder         Date Noted: 07/13/2009      Rash         Date Noted: 06/09/2009      Disorder of function of stomach         Date Noted: 04/22/2009      Benign neoplasm of thyroid gland         Date Noted: 08/14/2007      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 [facility])         Date Noted: 07/27/2007      Current Outpatient Prescriptions:  lisinopril-hydrochlorothiazide (PRINZIDE;ZESTORETIC) 10-12.5 MG per tablet, TAKE TWO TABLETS BY MOUTH DAILY, Disp: 60 tablet, Rfl: 2  furosemide (LASIX) 20 MG tablet, Take 1 tablet by mouth daily, Disp: 30 tablet, Rfl: 3  lansoprazole (PREVACID) 15 MG delayed release capsule, Take 15 mg by mouth daily, Disp: , Rfl:   SYNTHROID 137 MCG tablet, Take 137 mcg by mouth daily and all orders for this visit:    Allergic cough  -     XR CHEST STANDARD (2 VW); Future  -     methylPREDNISolone (MEDROL, AMANDA,) 4 MG tablet; As directed. 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

## 2019-03-06 DIAGNOSIS — R60.9 SWELLING: ICD-10-CM

## 2019-03-07 RX ORDER — FUROSEMIDE 20 MG/1
TABLET ORAL
Qty: 30 TABLET | Refills: 2 | Status: SHIPPED | OUTPATIENT
Start: 2019-03-07 | End: 2019-06-03 | Stop reason: SDUPTHER

## 2019-03-07 RX ORDER — LISINOPRIL AND HYDROCHLOROTHIAZIDE 12.5; 1 MG/1; MG/1
TABLET ORAL
Qty: 60 TABLET | Refills: 1 | Status: SHIPPED | OUTPATIENT
Start: 2019-03-07 | End: 2019-07-02 | Stop reason: SDUPTHER

## 2019-03-22 ENCOUNTER — OFFICE VISIT (OUTPATIENT)
Dept: INTERNAL MEDICINE CLINIC | Age: 59
End: 2019-03-22
Payer: COMMERCIAL

## 2019-03-22 VITALS
HEART RATE: 70 BPM | TEMPERATURE: 98.2 F | WEIGHT: 267 LBS | SYSTOLIC BLOOD PRESSURE: 132 MMHG | OXYGEN SATURATION: 98 % | BODY MASS INDEX: 39.43 KG/M2 | DIASTOLIC BLOOD PRESSURE: 70 MMHG

## 2019-03-22 DIAGNOSIS — J45.909 ALLERGIC BRONCHITIS WITHOUT COMPLICATION: ICD-10-CM

## 2019-03-22 DIAGNOSIS — J06.9 ACUTE URI: ICD-10-CM

## 2019-03-22 DIAGNOSIS — D44.9: ICD-10-CM

## 2019-03-22 DIAGNOSIS — D43.9: ICD-10-CM

## 2019-03-22 DIAGNOSIS — J30.9 ALLERGIC RHINITIS, UNSPECIFIED SEASONALITY, UNSPECIFIED TRIGGER: Primary | ICD-10-CM

## 2019-03-22 PROCEDURE — 99214 OFFICE O/P EST MOD 30 MIN: CPT | Performed by: INTERNAL MEDICINE

## 2019-03-22 RX ORDER — LEVOFLOXACIN 500 MG/1
500 TABLET, FILM COATED ORAL DAILY
Qty: 10 TABLET | Refills: 0 | Status: SHIPPED | OUTPATIENT
Start: 2019-03-22 | End: 2019-04-01

## 2019-03-22 RX ORDER — GUAIFENESIN AND CODEINE PHOSPHATE 100; 10 MG/5ML; MG/5ML
10 SOLUTION ORAL 3 TIMES DAILY PRN
Qty: 120 ML | Refills: 0 | Status: SHIPPED | OUTPATIENT
Start: 2019-03-22 | End: 2019-03-29

## 2019-03-22 RX ORDER — METHYLPREDNISOLONE 4 MG/1
TABLET ORAL
Qty: 1 KIT | Refills: 0 | Status: SHIPPED | OUTPATIENT
Start: 2019-03-22 | End: 2019-03-28

## 2019-03-22 ASSESSMENT — PATIENT HEALTH QUESTIONNAIRE - PHQ9
2. FEELING DOWN, DEPRESSED OR HOPELESS: 0
1. LITTLE INTEREST OR PLEASURE IN DOING THINGS: 0
SUM OF ALL RESPONSES TO PHQ9 QUESTIONS 1 & 2: 0
SUM OF ALL RESPONSES TO PHQ QUESTIONS 1-9: 0
SUM OF ALL RESPONSES TO PHQ QUESTIONS 1-9: 0

## 2019-03-22 ASSESSMENT — ENCOUNTER SYMPTOMS
RHINORRHEA: 1
GASTROINTESTINAL NEGATIVE: 1
SORE THROAT: 1
COUGH: 1
SHORTNESS OF BREATH: 0
WHEEZING: 0

## 2019-05-07 ENCOUNTER — OFFICE VISIT (OUTPATIENT)
Dept: INTERNAL MEDICINE CLINIC | Age: 59
End: 2019-05-07
Payer: COMMERCIAL

## 2019-05-07 VITALS
WEIGHT: 261 LBS | DIASTOLIC BLOOD PRESSURE: 72 MMHG | TEMPERATURE: 98.2 F | BODY MASS INDEX: 38.66 KG/M2 | SYSTOLIC BLOOD PRESSURE: 118 MMHG | HEIGHT: 69 IN

## 2019-05-07 DIAGNOSIS — J06.9 ACUTE URI: Primary | ICD-10-CM

## 2019-05-07 DIAGNOSIS — R05.8 ALLERGIC COUGH: ICD-10-CM

## 2019-05-07 DIAGNOSIS — J30.9 ALLERGIC RHINITIS, UNSPECIFIED SEASONALITY, UNSPECIFIED TRIGGER: ICD-10-CM

## 2019-05-07 PROCEDURE — 99214 OFFICE O/P EST MOD 30 MIN: CPT | Performed by: INTERNAL MEDICINE

## 2019-05-07 RX ORDER — HYDROCODONE POLISTIREX AND CHLORPHENIRAMINE POLISTIREX 10; 8 MG/5ML; MG/5ML
5 SUSPENSION, EXTENDED RELEASE ORAL EVERY 12 HOURS PRN
Qty: 120 ML | Refills: 0 | Status: SHIPPED | OUTPATIENT
Start: 2019-05-07 | End: 2019-05-17

## 2019-05-07 RX ORDER — METHYLPREDNISOLONE 4 MG/1
TABLET ORAL
Qty: 1 KIT | Refills: 0 | Status: SHIPPED | OUTPATIENT
Start: 2019-05-07 | End: 2019-05-13

## 2019-05-07 RX ORDER — AZITHROMYCIN 250 MG/1
TABLET, FILM COATED ORAL
Qty: 6 TABLET | Refills: 0 | Status: SHIPPED | OUTPATIENT
Start: 2019-05-07 | End: 2019-05-17

## 2019-05-07 ASSESSMENT — ENCOUNTER SYMPTOMS
GASTROINTESTINAL NEGATIVE: 1
WHEEZING: 0
SHORTNESS OF BREATH: 0
RHINORRHEA: 1
SINUS PRESSURE: 1
COUGH: 1
SORE THROAT: 1

## 2019-05-07 NOTE — PROGRESS NOTES
Subjective:      Patient ID: Delphine Lambert is a 62 y.o. female.   Chief Complaint   Patient presents with    URI     dry cough, congestion, scratchy throat, runny nose, achey       Delphine Lambert is a 62 y.o. female with the following history as recorded in [employer]:  Patient Active Problem List    Acute non-recurrent frontal sinusitis         Date Noted: 07/29/2016      Cellulitis of finger of right hand         Date Noted: 07/29/2016      HLD (hyperlipidemia)         Date Noted: 09/24/2015      Bilateral leg edema         Date Noted: 09/24/2015      Chronic venous hypertension with inflammation involving both sides         Date Noted: 01/30/2015      Chronic venous insufficiency         Date Noted: 01/30/2015      Swelling         Date Noted: 01/30/2015      Menorrhagia         Date Noted: 11/07/2011      Unilateral complete paralysis of vocal cord         Date Noted: 09/01/2011      Dyspnea and respiratory abnormality         Date Noted: 09/01/2011      Thyroid mass      GERD (gastroesophageal reflux disease)      Hypothyroidism      Allergic rhinitis      Hypertension      Dysuria         Date Noted: 07/20/2010      Atopic rhinitis         Date Noted: 02/04/2010      Skin disorder         Date Noted: 07/13/2009      Rash         Date Noted: 06/09/2009      Disorder of function of stomach         Date Noted: 04/22/2009      Benign neoplasm of thyroid gland         Date Noted: 08/14/2007      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 [facility])         Date Noted: 07/27/2007      Current Outpatient Medications:  Progesterone Micronized (PROGESTERONE PO), Take 200 mg by mouth, Disp: , Rfl:   furosemide (LASIX) 20 MG tablet, TAKE ONE TABLET BY MOUTH DAILY, Disp: 30 tablet, Rfl: 2  lisinopril-hydrochlorothiazide (PRINZIDE;ZESTORETIC) 10-12.5 MG per tablet, TAKE TWO TABLETS BY MOUTH DAILY, Disp: 60 tablet, Rfl: 1  lansoprazole (PREVACID) 15 MG delayed release capsule, Take 15 mg by mouth daily, Disp: , Rfl:   SYNTHROID 137 MCG tablet, Take 137 mcg by mouth daily , Disp: , Rfl:   Cyanocobalamin (VITAMIN B 12 PO), Take  by mouth., Disp: , Rfl:   Vitamin D (CHOLECALCIFEROL) 1000 UNITS CAPS capsule, Take 5,000 Units by mouth daily , Disp: , Rfl:     No current facility-administered medications for this visit. Allergies: Sulfa antibiotics  Past Medical History:  No date: Allergic rhinitis  No date: Chronic allergic rhinitis  No date: Dyspepsia  No date: Hypertension  No date: Hypothyroidism  Past Surgical History:  2009: COLONOSCOPY  1995: THROAT SURGERY      Comment:  [address]: THYROID SURGERY  No date: VASCULAR SURGERY; Bilateral  Review of patient's family history indicates:  Problem: Cancer      Relation: Paternal Uncle          Age of Onset: (Not Specified)          Comment: colon/liver  Problem: Cancer      Relation: Maternal Grandmother          Age of Onset: (Not Specified)          Comment: cervical  Problem: Cancer      Relation: Maternal Grandfather          Age of Onset: (Not Specified)          Comment: colon    Social History    Tobacco Use      Smoking status: Never Smoker      Smokeless tobacco: Never Used    Alcohol use: Yes      Comment: social      URI    This is a new problem. The current episode started 1 to 4 weeks ago. The problem has been gradually worsening. There has been no fever. Associated symptoms include congestion, coughing, headaches, rhinorrhea, sneezing and a sore throat. Pertinent negatives include no chest pain, ear pain, rash or wheezing. She has tried antihistamine and decongestant for the symptoms. The treatment provided no relief. Review of Systems   Constitutional: Positive for fatigue. HENT: Positive for congestion, postnasal drip, rhinorrhea, sinus pressure, sneezing and sore throat. Negative for ear pain. Respiratory: Positive for cough. Negative for shortness of breath and wheezing. Cardiovascular: Negative for chest pain. Gastrointestinal: Negative. Genitourinary: Negative. Musculoskeletal: Positive for myalgias. Skin: Negative for rash. Neurological: Positive for headaches. Objective:   Physical Exam   Constitutional: She is oriented to person, place, and time. She appears well-developed and well-nourished. No distress. HENT:   Head: Normocephalic and atraumatic. Right Ear: External ear normal.   Left Ear: External ear normal.   Nose: Mucosal edema and rhinorrhea present. Mouth/Throat: Posterior oropharyngeal edema and posterior oropharyngeal erythema present. No oropharyngeal exudate. Eyes: Pupils are equal, round, and reactive to light. Conjunctivae and EOM are normal. Right eye exhibits no discharge. Left eye exhibits no discharge. No scleral icterus. Neck: Normal range of motion. Neck supple. No JVD present. No tracheal deviation present. No thyromegaly present. Cardiovascular: Normal rate, regular rhythm, normal heart sounds and intact distal pulses. Exam reveals no gallop and no friction rub. No murmur heard. Pulmonary/Chest: Effort normal and breath sounds normal. No stridor. No respiratory distress. She has no wheezes. She has no rales. She exhibits no tenderness. Abdominal: Soft. She exhibits no distension and no mass. There is no tenderness. There is no rebound and no guarding. Genitourinary: Vagina normal. Rectal exam shows guaiac negative stool. No vaginal discharge found. Musculoskeletal: Normal range of motion. She exhibits no edema or tenderness. Lymphadenopathy:     She has no cervical adenopathy. Neurological: She is alert and oriented to person, place, and time. She has normal reflexes. She displays normal reflexes. No cranial nerve deficit. She exhibits normal muscle tone. Coordination normal.   Skin: Skin is warm and dry. No rash noted. She is not diaphoretic. No erythema. No pallor. Psychiatric: She has a normal mood and affect.  Her behavior is normal. Judgment and thought content normal.       Assessment:      Encounter Diagnoses   Name Primary?  Acute URI Yes    Allergic cough     Allergic rhinitis, unspecified seasonality, unspecified trigger            Plan:      Saskia Garces was seen today for uri. Diagnoses and all orders for this visit:    Acute URI  -     azithromycin (ZITHROMAX Z-AMANDA) 250 MG tablet; As directed. Allergic cough  -     hydrocodone-chlorpheniramine (TUSSIONEX PENNKINETIC ER) 10-8 MG/5ML SUER; Take 5 mLs by mouth every 12 hours as needed (cough) for up to 10 days. Allergic rhinitis, unspecified seasonality, unspecified trigger  -     methylPREDNISolone (MEDROL, AMANDA,) 4 MG tablet; As directed.               Camilla Garcia MD

## 2019-06-03 ENCOUNTER — OFFICE VISIT (OUTPATIENT)
Dept: INTERNAL MEDICINE CLINIC | Age: 59
End: 2019-06-03
Payer: COMMERCIAL

## 2019-06-03 VITALS
HEART RATE: 76 BPM | BODY MASS INDEX: 38.4 KG/M2 | WEIGHT: 260 LBS | SYSTOLIC BLOOD PRESSURE: 116 MMHG | DIASTOLIC BLOOD PRESSURE: 80 MMHG | TEMPERATURE: 98.4 F | OXYGEN SATURATION: 97 %

## 2019-06-03 DIAGNOSIS — R60.9 SWELLING: ICD-10-CM

## 2019-06-03 DIAGNOSIS — J01.00 ACUTE MAXILLARY SINUSITIS, RECURRENCE NOT SPECIFIED: Primary | ICD-10-CM

## 2019-06-03 PROCEDURE — 99213 OFFICE O/P EST LOW 20 MIN: CPT | Performed by: INTERNAL MEDICINE

## 2019-06-03 RX ORDER — METHYLPREDNISOLONE 4 MG/1
TABLET ORAL
Qty: 1 KIT | Refills: 0 | Status: SHIPPED | OUTPATIENT
Start: 2019-06-03 | End: 2019-06-09

## 2019-06-03 RX ORDER — LEVOFLOXACIN 500 MG/1
500 TABLET, FILM COATED ORAL DAILY
Qty: 10 TABLET | Refills: 0 | Status: SHIPPED | OUTPATIENT
Start: 2019-06-03 | End: 2019-06-13

## 2019-06-03 RX ORDER — FUROSEMIDE 20 MG/1
20 TABLET ORAL DAILY
Qty: 30 TABLET | Refills: 3 | Status: SHIPPED | OUTPATIENT
Start: 2019-06-03 | End: 2019-11-03 | Stop reason: SDUPTHER

## 2019-06-03 ASSESSMENT — ENCOUNTER SYMPTOMS
SINUS COMPLAINT: 1
CHOKING: 0
COUGH: 1
SORE THROAT: 1
HOARSE VOICE: 1
SINUS PRESSURE: 1
GASTROINTESTINAL NEGATIVE: 1

## 2019-06-03 NOTE — PROGRESS NOTES
Subjective:      Patient ID: Joanna Bishop is a 62 y.o. female. Patient presents with:  URI: head congestion, going into chest, eyes hurt, glands feel tender, headache, ears feels stuffy,off and on for months, still blowing a lot of mucous. used several otc meds, no relief.    Medication Refill: furosemide to ted Alfonso is a 62 y.o. female with the following history as recorded in [facility]:  Patient Active Problem List    Acute non-recurrent frontal sinusitis         Date Noted: 07/29/2016      Cellulitis of finger of right hand         Date Noted: 07/29/2016      HLD (hyperlipidemia)         Date Noted: 09/24/2015      Bilateral leg edema         Date Noted: 09/24/2015      Chronic venous hypertension with inflammation involving both sides         Date Noted: 01/30/2015      Chronic venous insufficiency         Date Noted: 01/30/2015      Swelling         Date Noted: 01/30/2015      Menorrhagia         Date Noted: 11/07/2011      Unilateral complete paralysis of vocal cord         Date Noted: 09/01/2011      Dyspnea and respiratory abnormality         Date Noted: 09/01/2011      Thyroid mass      GERD (gastroesophageal reflux disease)      Hypothyroidism      Allergic rhinitis      Hypertension      Dysuria         Date Noted: 07/20/2010      Atopic rhinitis         Date Noted: 02/04/2010      Skin disorder         Date Noted: 07/13/2009      Rash         Date Noted: 06/09/2009      Disorder of function of stomach         Date Noted: 04/22/2009      Benign neoplasm of thyroid gland         Date Noted: 08/14/2007      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 [facility])         Date Noted: 07/27/2007      Current Outpatient Medications:  furosemide (LASIX) 20 MG tablet, Take 1 tablet by mouth daily, Disp: 30 tablet, Rfl: 3  levofloxacin (LEVAQUIN) 500 MG tablet, Take 1 tablet by mouth daily for 10 days, Disp: 10 tablet, Rfl: 0  methylPREDNISolone (MEDROL, AMANDA,) 4 MG tablet, As directed., Disp: 1 kit, Rfl: 0  Progesterone Micronized (PROGESTERONE PO), Take 200 mg by mouth, Disp: , Rfl:   lisinopril-hydrochlorothiazide (PRINZIDE;ZESTORETIC) 10-12.5 MG per tablet, TAKE TWO TABLETS BY MOUTH DAILY, Disp: 60 tablet, Rfl: 1  lansoprazole (PREVACID) 15 MG delayed release capsule, Take 15 mg by mouth daily, Disp: , Rfl:   SYNTHROID 137 MCG tablet, Take 137 mcg by mouth daily , Disp: , Rfl:   Cyanocobalamin (VITAMIN B 12 PO), Take  by mouth., Disp: , Rfl:   Vitamin D (CHOLECALCIFEROL) 1000 UNITS CAPS capsule, Take 5,000 Units by mouth daily , Disp: , Rfl:     No current facility-administered medications for this visit. Allergies: Sulfa antibiotics  Past Medical History:  No date: Allergic rhinitis  No date: Chronic allergic rhinitis  No date: Dyspepsia  No date: Hypertension  No date: Hypothyroidism  Past Surgical History:  2009: COLONOSCOPY  1995: THROAT SURGERY      Comment:  [address] [facility]  [id number]: THYROID SURGERY  No date: VASCULAR SURGERY; Bilateral  Review of patient's family history indicates:  Problem: Cancer      Relation: Paternal Uncle          Age of Onset: (Not Specified)          Comment: colon/liver  Problem: Cancer      Relation: Maternal Grandmother          Age of Onset: (Not Specified)          Comment: cervical  Problem: Cancer      Relation: Maternal Grandfather          Age of Onset: (Not Specified)          Comment: colon    Social History    Tobacco Use      Smoking status: Never Smoker      Smokeless tobacco: Never Used    Alcohol use: Yes      Comment: social      Sinus Problem   This is a recurrent problem. The current episode started 1 to 4 weeks ago. The problem is unchanged. There has been no fever. The pain is mild. Associated symptoms include congestion, coughing, a hoarse voice, sinus pressure and a sore throat. Pertinent negatives include no chills. Past treatments include nothing. The treatment provided no relief.        Review of Systems rash noted. She is not diaphoretic. No erythema. No pallor. Psychiatric: She has a normal mood and affect. Her behavior is normal. Judgment and thought content normal.       Assessment:      Encounter Diagnoses   Name Primary?  Acute maxillary sinusitis, recurrence not specified Yes    Swelling            Plan:      Elroy Pang was seen today for uri and medication refill. Diagnoses and all orders for this visit:    Acute maxillary sinusitis, recurrence not specified  -     methylPREDNISolone (MEDROL, AMANDA,) 4 MG tablet; As directed. Swelling  -     furosemide (LASIX) 20 MG tablet; Take 1 tablet by mouth daily    Other orders  -     levofloxacin (LEVAQUIN) 500 MG tablet;  Take 1 tablet by mouth daily for 10 days              Melanie Gomez MD

## 2019-06-03 NOTE — PATIENT INSTRUCTIONS
Please call your pharmacy if you need any refills of your medication(s). Please call our office at [phone] if you don't hear from us about your test results. Bring an accurate list of your medications with you at every appointment to ensure that we have the correct information.     Our office hours are: Monday - Friday 8:30 am- 5 pm    Phone lines turn on at 8:30 am

## 2019-07-02 RX ORDER — LISINOPRIL AND HYDROCHLOROTHIAZIDE 12.5; 1 MG/1; MG/1
TABLET ORAL
Qty: 60 TABLET | Refills: 0 | Status: SHIPPED | OUTPATIENT
Start: 2019-07-02 | End: 2019-07-31 | Stop reason: SDUPTHER

## 2019-07-23 ENCOUNTER — OFFICE VISIT (OUTPATIENT)
Dept: INTERNAL MEDICINE CLINIC | Age: 59
End: 2019-07-23
Payer: COMMERCIAL

## 2019-07-23 VITALS
TEMPERATURE: 98.4 F | OXYGEN SATURATION: 98 % | HEART RATE: 74 BPM | HEIGHT: 69 IN | SYSTOLIC BLOOD PRESSURE: 126 MMHG | BODY MASS INDEX: 38.54 KG/M2 | DIASTOLIC BLOOD PRESSURE: 84 MMHG | WEIGHT: 260.2 LBS

## 2019-07-23 DIAGNOSIS — K57.92 DIVERTICULITIS: Primary | ICD-10-CM

## 2019-07-23 PROCEDURE — 99213 OFFICE O/P EST LOW 20 MIN: CPT | Performed by: INTERNAL MEDICINE

## 2019-07-23 RX ORDER — METRONIDAZOLE 500 MG/1
500 TABLET ORAL 3 TIMES DAILY
Qty: 42 TABLET | Refills: 0 | Status: SHIPPED | OUTPATIENT
Start: 2019-07-23 | End: 2019-08-06

## 2019-07-23 RX ORDER — CIPROFLOXACIN 500 MG/1
500 TABLET, FILM COATED ORAL 2 TIMES DAILY
Qty: 28 TABLET | Refills: 0 | Status: SHIPPED | OUTPATIENT
Start: 2019-07-23 | End: 2019-08-06

## 2019-07-23 ASSESSMENT — ENCOUNTER SYMPTOMS
CHOKING: 0
NAUSEA: 1
STRIDOR: 0
SINUS PRESSURE: 0
APNEA: 0
COLOR CHANGE: 0
ABDOMINAL PAIN: 1
EYES NEGATIVE: 1

## 2019-07-23 NOTE — PROGRESS NOTES
Subjective:      Patient ID: Aung Day is a 62 y.o. female. Patient presents with:  Abdominal Pain: LLQ pain intermitted x 1 week Hs of diverticulitis before.     Aung aDy is a 62 y.o. female with the following history as recorded in [facility]:  Patient Active Problem List    Acute non-recurrent frontal sinusitis         Date Noted: 07/29/2016      Cellulitis of finger of right hand         Date Noted: 07/29/2016      MELISSA (obstructive sleep apnea)         Date Noted: 12/17/2015      HLD (hyperlipidemia)         Date Noted: 09/24/2015      Bilateral leg edema         Date Noted: 09/24/2015      Chronic venous hypertension with inflammation involving both sides         Date Noted: 01/30/2015      Chronic venous insufficiency         Date Noted: 01/30/2015      Swelling         Date Noted: 01/30/2015      Family history of colonic polyps         Date Noted: 10/22/2014      Menorrhagia         Date Noted: 11/07/2011      Unilateral complete paralysis of vocal cord         Date Noted: 09/01/2011      Dyspnea and respiratory abnormality         Date Noted: 09/01/2011      Thyroid mass      GERD (gastroesophageal reflux disease)      Hypothyroidism      Allergic rhinitis      Hypertension      Dysuria         Date Noted: 07/20/2010      Atopic rhinitis         Date Noted: 02/04/2010      Skin disorder         Date Noted: 07/13/2009      Rash         Date Noted: 06/09/2009      Disorder of function of stomach         Date Noted: 04/22/2009      Benign neoplasm of thyroid gland         Date Noted: 08/14/2007      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)         Date Noted: 07/27/2007      Current Outpatient Medications:  metroNIDAZOLE (FLAGYL) 500 MG tablet, Take 1 tablet by mouth 3 times daily for 14 days, Disp: 42 tablet, Rfl: 0  ciprofloxacin (CIPRO) 500 MG tablet, Take 1 tablet by mouth 2 times daily for 14 days, Disp: 28 tablet, Rfl: 0  lisinopril-hydrochlorothiazide (PRINZIDE;ZESTORETIC) 10-12.5 MG per tablet, TAKE TWO TABLETS BY MOUTH DAILY, Disp: 60 tablet, Rfl: 0  furosemide (LASIX) 20 MG tablet, Take 1 tablet by mouth daily, Disp: 30 tablet, Rfl: 3  Progesterone Micronized (PROGESTERONE PO), Take 200 mg by mouth, Disp: , Rfl:   lansoprazole (PREVACID) 15 MG delayed release capsule, Take 15 mg by mouth daily, Disp: , Rfl:   SYNTHROID 137 MCG tablet, Take 137 mcg by mouth daily , Disp: , Rfl:   Cyanocobalamin (VITAMIN B 12 PO), Take  by mouth., Disp: , Rfl:   Vitamin D (CHOLECALCIFEROL) 1000 UNITS CAPS capsule, Take 5,000 Units by mouth daily , Disp: , Rfl:     No current facility-administered medications for this visit. Allergies: Sulfa antibiotics  Past Medical History:  No date: Allergic rhinitis  No date: Chronic allergic rhinitis  No date: Dyspepsia  No date: Hypertension  No date: Hypothyroidism  Past Surgical History:  2009: COLONOSCOPY  1995: THROAT SURGERY      Comment:  Paulette Greer: THYROID SURGERY  No date: VASCULAR SURGERY; Bilateral  Review of patient's family history indicates:  Problem: Cancer      Relation: Paternal Uncle          Age of Onset: (Not Specified)          Comment: colon/liver  Problem: Cancer      Relation: Maternal Grandmother          Age of Onset: (Not Specified)          Comment: cervical  Problem: Cancer      Relation: Maternal Grandfather          Age of Onset: (Not Specified)          Comment: colon    Social History    Tobacco Use      Smoking status: Never Smoker      Smokeless tobacco: Never Used    Alcohol use: Yes      Comment: social      Abdominal Pain   This is a recurrent problem. The current episode started in the past 7 days. The onset quality is sudden. The problem occurs constantly. The problem has been unchanged. The pain is located in the LLQ. The pain is moderate. The quality of the pain is aching. The abdominal pain does not radiate. Associated symptoms include anorexia and nausea.  Pertinent negatives

## 2019-08-01 RX ORDER — LISINOPRIL AND HYDROCHLOROTHIAZIDE 12.5; 1 MG/1; MG/1
TABLET ORAL
Qty: 60 TABLET | Refills: 0 | Status: SHIPPED | OUTPATIENT
Start: 2019-08-01 | End: 2019-09-05 | Stop reason: SDUPTHER

## 2019-08-21 ENCOUNTER — HOSPITAL ENCOUNTER (OUTPATIENT)
Dept: WOMENS IMAGING | Age: 59
Discharge: HOME | End: 2019-08-21
Payer: COMMERCIAL

## 2019-08-21 DIAGNOSIS — Z12.31 VISIT FOR SCREENING MAMMOGRAM: ICD-10-CM

## 2019-08-21 PROCEDURE — 77063 BREAST TOMOSYNTHESIS BI: CPT

## 2019-09-05 RX ORDER — LEVOTHYROXINE SODIUM 137 UG/1
137 TABLET ORAL DAILY
Qty: 90 TABLET | Refills: 1 | Status: SHIPPED | OUTPATIENT
Start: 2019-09-05 | End: 2020-03-02

## 2019-09-05 RX ORDER — LISINOPRIL AND HYDROCHLOROTHIAZIDE 12.5; 1 MG/1; MG/1
TABLET ORAL
Qty: 60 TABLET | Refills: 0 | Status: SHIPPED | OUTPATIENT
Start: 2019-09-05 | End: 2019-10-04 | Stop reason: SDUPTHER

## 2019-10-04 RX ORDER — LISINOPRIL AND HYDROCHLOROTHIAZIDE 12.5; 1 MG/1; MG/1
TABLET ORAL
Qty: 60 TABLET | Refills: 0 | Status: SHIPPED | OUTPATIENT
Start: 2019-10-04 | End: 2019-11-03 | Stop reason: SDUPTHER

## 2019-11-03 DIAGNOSIS — R60.9 SWELLING: ICD-10-CM

## 2019-11-04 RX ORDER — FUROSEMIDE 20 MG/1
TABLET ORAL
Qty: 30 TABLET | Refills: 2 | Status: SHIPPED | OUTPATIENT
Start: 2019-11-04 | End: 2020-01-30

## 2019-11-04 RX ORDER — LISINOPRIL AND HYDROCHLOROTHIAZIDE 12.5; 1 MG/1; MG/1
TABLET ORAL
Qty: 60 TABLET | Refills: 0 | Status: SHIPPED | OUTPATIENT
Start: 2019-11-04 | End: 2019-12-06 | Stop reason: SDUPTHER

## 2019-12-06 RX ORDER — LISINOPRIL AND HYDROCHLOROTHIAZIDE 12.5; 1 MG/1; MG/1
TABLET ORAL
Qty: 60 TABLET | Refills: 0 | Status: SHIPPED | OUTPATIENT
Start: 2019-12-06 | End: 2020-01-02

## 2019-12-19 ENCOUNTER — OFFICE VISIT (OUTPATIENT)
Dept: INTERNAL MEDICINE CLINIC | Age: 59
End: 2019-12-19
Payer: COMMERCIAL

## 2019-12-19 VITALS
SYSTOLIC BLOOD PRESSURE: 130 MMHG | OXYGEN SATURATION: 97 % | HEART RATE: 91 BPM | BODY MASS INDEX: 38.25 KG/M2 | DIASTOLIC BLOOD PRESSURE: 72 MMHG | WEIGHT: 259 LBS | TEMPERATURE: 98.5 F

## 2019-12-19 DIAGNOSIS — D44.9: ICD-10-CM

## 2019-12-19 DIAGNOSIS — J30.9 ALLERGIC RHINITIS, UNSPECIFIED SEASONALITY, UNSPECIFIED TRIGGER: ICD-10-CM

## 2019-12-19 DIAGNOSIS — J01.00 ACUTE MAXILLARY SINUSITIS, RECURRENCE NOT SPECIFIED: ICD-10-CM

## 2019-12-19 DIAGNOSIS — J06.9 ACUTE URI: Primary | ICD-10-CM

## 2019-12-19 DIAGNOSIS — D43.9: ICD-10-CM

## 2019-12-19 PROCEDURE — 99214 OFFICE O/P EST MOD 30 MIN: CPT | Performed by: INTERNAL MEDICINE

## 2019-12-19 RX ORDER — AZITHROMYCIN 250 MG/1
TABLET, FILM COATED ORAL
Qty: 6 TABLET | Refills: 0 | Status: SHIPPED | OUTPATIENT
Start: 2019-12-19 | End: 2019-12-29

## 2019-12-19 RX ORDER — HYDROCODONE POLISTIREX AND CHLORPHENIRAMINE POLISTIREX 10; 8 MG/5ML; MG/5ML
5 SUSPENSION, EXTENDED RELEASE ORAL EVERY 12 HOURS PRN
Qty: 120 ML | Refills: 0 | Status: SHIPPED | OUTPATIENT
Start: 2019-12-19 | End: 2019-12-29

## 2019-12-19 ASSESSMENT — ENCOUNTER SYMPTOMS
RHINORRHEA: 1
GASTROINTESTINAL NEGATIVE: 1
COUGH: 1
WHEEZING: 0
SHORTNESS OF BREATH: 0

## 2019-12-23 ENCOUNTER — TELEPHONE (OUTPATIENT)
Dept: INTERNAL MEDICINE CLINIC | Age: 59
End: 2019-12-23

## 2019-12-23 RX ORDER — METHYLPREDNISOLONE 4 MG/1
TABLET ORAL
Qty: 1 KIT | Refills: 0 | Status: SHIPPED | OUTPATIENT
Start: 2019-12-23 | End: 2019-12-29

## 2020-01-02 RX ORDER — LISINOPRIL AND HYDROCHLOROTHIAZIDE 12.5; 1 MG/1; MG/1
TABLET ORAL
Qty: 60 TABLET | Refills: 0 | Status: SHIPPED | OUTPATIENT
Start: 2020-01-02 | End: 2020-03-02

## 2020-01-09 ENCOUNTER — TELEPHONE (OUTPATIENT)
Dept: INTERNAL MEDICINE CLINIC | Age: 60
End: 2020-01-09

## 2020-01-09 RX ORDER — LEVOFLOXACIN 250 MG/1
250 TABLET ORAL DAILY
Qty: 7 TABLET | Refills: 0 | Status: SHIPPED | OUTPATIENT
Start: 2020-01-09 | End: 2022-01-04 | Stop reason: SDUPTHER

## 2020-01-09 RX ORDER — METHYLPREDNISOLONE 4 MG/1
TABLET ORAL
Qty: 1 KIT | Refills: 0 | Status: SHIPPED | OUTPATIENT
Start: 2020-01-09 | End: 2022-01-04 | Stop reason: SDUPTHER

## 2020-01-09 NOTE — TELEPHONE ENCOUNTER
Patient calling stating she has finished her medication with no improvement, Patient requesting another medication, If any questions for patient call at [address] [facility], [address]  F [phone]

## 2020-01-30 RX ORDER — FUROSEMIDE 20 MG/1
TABLET ORAL
Qty: 30 TABLET | Refills: 1 | Status: SHIPPED | OUTPATIENT
Start: 2020-01-30 | End: 2020-03-30

## 2020-02-28 ENCOUNTER — OFFICE VISIT (OUTPATIENT)
Dept: INTERNAL MEDICINE CLINIC | Age: 60
End: 2020-02-28
Payer: COMMERCIAL

## 2020-02-28 VITALS
HEIGHT: 69 IN | OXYGEN SATURATION: 98 % | TEMPERATURE: 97.7 F | BODY MASS INDEX: 37.62 KG/M2 | DIASTOLIC BLOOD PRESSURE: 78 MMHG | WEIGHT: 254 LBS | SYSTOLIC BLOOD PRESSURE: 126 MMHG | HEART RATE: 64 BPM

## 2020-02-28 LAB — POTASSIUM SERPL-SCNC: 3.6 MMOL/L (ref 3.5–5.1)

## 2020-02-28 PROCEDURE — 99214 OFFICE O/P EST MOD 30 MIN: CPT | Performed by: INTERNAL MEDICINE

## 2020-02-28 PROCEDURE — 93000 ELECTROCARDIOGRAM COMPLETE: CPT | Performed by: INTERNAL MEDICINE

## 2020-02-28 ASSESSMENT — ENCOUNTER SYMPTOMS
EYES NEGATIVE: 1
RESPIRATORY NEGATIVE: 1

## 2020-02-28 NOTE — PATIENT INSTRUCTIONS
Please call your pharmacy if you need any refills of your medication(s). Please call our office at [phone] if you don't hear from us about your test results. Bring an accurate list of your medications with you at every appointment to ensure that we have the correct information.     Our office hours are: Monday - Friday 8:30 am- 5 pm    Phone lines turn on at 8:30 am

## 2020-03-02 RX ORDER — LEVOTHYROXINE SODIUM 137 UG/1
TABLET ORAL
Qty: 30 TABLET | Refills: 0 | Status: SHIPPED | OUTPATIENT
Start: 2020-03-02 | End: 2020-03-30

## 2020-03-02 RX ORDER — LISINOPRIL AND HYDROCHLOROTHIAZIDE 25; 20 MG/1; MG/1
TABLET ORAL
Qty: 30 TABLET | Refills: 0 | Status: SHIPPED | OUTPATIENT
Start: 2020-03-02 | End: 2020-04-02

## 2020-03-30 RX ORDER — FUROSEMIDE 20 MG/1
TABLET ORAL
Qty: 30 TABLET | Refills: 0 | Status: SHIPPED | OUTPATIENT
Start: 2020-03-30 | End: 2020-04-28

## 2020-03-30 RX ORDER — LEVOTHYROXINE SODIUM 137 UG/1
TABLET ORAL
Qty: 30 TABLET | Refills: 0 | Status: SHIPPED | OUTPATIENT
Start: 2020-03-30 | End: 2020-04-28

## 2020-04-02 RX ORDER — LISINOPRIL AND HYDROCHLOROTHIAZIDE 25; 20 MG/1; MG/1
TABLET ORAL
Qty: 30 TABLET | Refills: 0 | Status: SHIPPED | OUTPATIENT
Start: 2020-04-02 | End: 2020-04-28

## 2020-04-28 RX ORDER — FUROSEMIDE 20 MG/1
TABLET ORAL
Qty: 30 TABLET | Refills: 0 | Status: SHIPPED | OUTPATIENT
Start: 2020-04-28 | End: 2020-06-01

## 2020-04-28 RX ORDER — LEVOTHYROXINE SODIUM 137 UG/1
TABLET ORAL
Qty: 30 TABLET | Refills: 0 | Status: SHIPPED | OUTPATIENT
Start: 2020-04-28 | End: 2020-06-01

## 2020-04-28 RX ORDER — LISINOPRIL AND HYDROCHLOROTHIAZIDE 25; 20 MG/1; MG/1
TABLET ORAL
Qty: 30 TABLET | Refills: 0 | Status: SHIPPED | OUTPATIENT
Start: 2020-04-28 | End: 2020-06-01

## 2020-05-27 ENCOUNTER — TELEPHONE (OUTPATIENT)
Dept: FAMILY MEDICINE CLINIC | Age: 60
End: 2020-05-27

## 2020-05-27 NOTE — TELEPHONE ENCOUNTER
Pt seen her OB and they did bw, at that time her white blood count was elevated. Pt is very tired, more than usual and pt would like to know if she could get her white blood count bw done again.      Pl advise  [phone]

## 2020-06-01 DIAGNOSIS — D72.829 LEUKOCYTOSIS, UNSPECIFIED TYPE: ICD-10-CM

## 2020-06-01 LAB
BASOPHILS ABSOLUTE: 0 K/UL (ref 0–0.2)
BASOPHILS RELATIVE PERCENT: 0.5 %
EOSINOPHILS ABSOLUTE: 0.2 K/UL (ref 0–0.6)
EOSINOPHILS RELATIVE PERCENT: 1.6 %
HCT VFR BLD CALC: 48.1 % (ref 36–48)
HEMOGLOBIN: 16 G/DL (ref 12–16)
LYMPHOCYTES ABSOLUTE: 2.1 K/UL (ref 1–5.1)
LYMPHOCYTES RELATIVE PERCENT: 22.4 %
MCH RBC QN AUTO: 32 PG (ref 26–34)
MCHC RBC AUTO-ENTMCNC: 33.2 G/DL (ref 31–36)
MCV RBC AUTO: 96.4 FL (ref 80–100)
MONOCYTES ABSOLUTE: 0.7 K/UL (ref 0–1.3)
MONOCYTES RELATIVE PERCENT: 7.1 %
NEUTROPHILS ABSOLUTE: 6.5 K/UL (ref 1.7–7.7)
NEUTROPHILS RELATIVE PERCENT: 68.4 %
PDW BLD-RTO: 14.5 % (ref 12.4–15.4)
PLATELET # BLD: 329 K/UL (ref 135–450)
PMV BLD AUTO: 7.9 FL (ref 5–10.5)
RBC # BLD: 4.99 M/UL (ref 4–5.2)
WBC # BLD: 9.4 K/UL (ref 4–11)

## 2020-06-01 RX ORDER — FUROSEMIDE 20 MG/1
TABLET ORAL
Qty: 30 TABLET | Refills: 0 | Status: SHIPPED | OUTPATIENT
Start: 2020-06-01 | End: 2020-06-23

## 2020-06-01 RX ORDER — LEVOTHYROXINE SODIUM 137 UG/1
TABLET ORAL
Qty: 30 TABLET | Refills: 0 | Status: SHIPPED | OUTPATIENT
Start: 2020-06-01 | End: 2020-06-23

## 2020-06-01 RX ORDER — LISINOPRIL AND HYDROCHLOROTHIAZIDE 25; 20 MG/1; MG/1
TABLET ORAL
Qty: 30 TABLET | Refills: 0 | Status: SHIPPED | OUTPATIENT
Start: 2020-06-01 | End: 2020-06-23

## 2020-06-23 RX ORDER — FUROSEMIDE 20 MG/1
TABLET ORAL
Qty: 30 TABLET | Refills: 0 | Status: SHIPPED | OUTPATIENT
Start: 2020-06-23 | End: 2020-07-27

## 2020-06-23 RX ORDER — LEVOTHYROXINE SODIUM 137 UG/1
TABLET ORAL
Qty: 30 TABLET | Refills: 0 | Status: SHIPPED | OUTPATIENT
Start: 2020-06-23 | End: 2020-07-27

## 2020-06-23 RX ORDER — LISINOPRIL AND HYDROCHLOROTHIAZIDE 25; 20 MG/1; MG/1
TABLET ORAL
Qty: 30 TABLET | Refills: 0 | Status: SHIPPED | OUTPATIENT
Start: 2020-06-23 | End: 2020-07-27

## 2020-07-27 RX ORDER — LEVOTHYROXINE SODIUM 137 UG/1
TABLET ORAL
Qty: 30 TABLET | Refills: 0 | Status: SHIPPED | OUTPATIENT
Start: 2020-07-27 | End: 2020-08-31

## 2020-07-27 RX ORDER — FUROSEMIDE 20 MG/1
TABLET ORAL
Qty: 30 TABLET | Refills: 0 | Status: SHIPPED | OUTPATIENT
Start: 2020-07-27 | End: 2020-08-28

## 2020-07-27 RX ORDER — LISINOPRIL AND HYDROCHLOROTHIAZIDE 25; 20 MG/1; MG/1
TABLET ORAL
Qty: 30 TABLET | Refills: 0 | Status: SHIPPED | OUTPATIENT
Start: 2020-07-27 | End: 2020-08-28

## 2020-08-21 ENCOUNTER — HOSPITAL ENCOUNTER (OUTPATIENT)
Dept: WOMENS IMAGING | Age: 60
Discharge: HOME | End: 2020-08-21
Payer: COMMERCIAL

## 2020-08-21 PROCEDURE — 77063 BREAST TOMOSYNTHESIS BI: CPT

## 2020-08-31 RX ORDER — LEVOTHYROXINE SODIUM 137 UG/1
TABLET ORAL
Qty: 30 TABLET | Refills: 0 | Status: SHIPPED | OUTPATIENT
Start: 2020-08-31 | End: 2020-09-28

## 2020-09-28 RX ORDER — LEVOTHYROXINE SODIUM 137 UG/1
TABLET ORAL
Qty: 30 TABLET | Refills: 0 | Status: SHIPPED | OUTPATIENT
Start: 2020-09-28 | End: 2020-10-26

## 2020-10-26 RX ORDER — LEVOTHYROXINE SODIUM 137 UG/1
TABLET ORAL
Qty: 30 TABLET | Refills: 0 | Status: SHIPPED | OUTPATIENT
Start: 2020-10-26 | End: 2020-11-23

## 2020-11-23 RX ORDER — LEVOTHYROXINE SODIUM 137 UG/1
TABLET ORAL
Qty: 30 TABLET | Refills: 0 | Status: SHIPPED | OUTPATIENT
Start: 2020-11-23 | End: 2020-12-21

## 2020-12-21 RX ORDER — LEVOTHYROXINE SODIUM 137 UG/1
TABLET ORAL
Qty: 30 TABLET | Refills: 0 | Status: SHIPPED | OUTPATIENT
Start: 2020-12-21 | End: 2021-01-21

## 2021-01-21 RX ORDER — LEVOTHYROXINE SODIUM 137 UG/1
TABLET ORAL
Qty: 30 TABLET | Refills: 0 | Status: SHIPPED | OUTPATIENT
Start: 2021-01-21 | End: 2021-02-22

## 2021-02-22 DIAGNOSIS — R60.9 SWELLING: ICD-10-CM

## 2021-02-22 RX ORDER — LEVOTHYROXINE SODIUM 137 UG/1
TABLET ORAL
Qty: 30 TABLET | Refills: 0 | Status: SHIPPED | OUTPATIENT
Start: 2021-02-22 | End: 2021-03-25

## 2021-02-22 RX ORDER — FUROSEMIDE 20 MG/1
TABLET ORAL
Qty: 30 TABLET | Refills: 4 | Status: SHIPPED | OUTPATIENT
Start: 2021-02-22 | End: 2021-07-27

## 2021-02-22 RX ORDER — LISINOPRIL AND HYDROCHLOROTHIAZIDE 25; 20 MG/1; MG/1
TABLET ORAL
Qty: 30 TABLET | Refills: 4 | Status: SHIPPED | OUTPATIENT
Start: 2021-02-22 | End: 2021-07-27

## 2021-03-12 ENCOUNTER — NURSE ONLY (OUTPATIENT)
Dept: PRIMARY CARE CLINIC | Age: 61
End: 2021-03-12
Payer: COMMERCIAL

## 2021-03-12 DIAGNOSIS — Z23 HIGH PRIORITY FOR COVID-19 VIRUS VACCINATION: Primary | ICD-10-CM

## 2021-03-12 PROCEDURE — 0001A COVID-19, PFIZER VACCINE 30MCG/0.3ML DOSE: CPT | Performed by: NURSE PRACTITIONER

## 2021-03-12 PROCEDURE — 91300 COVID-19, PFIZER VACCINE 30MCG/0.3ML DOSE: CPT | Performed by: NURSE PRACTITIONER

## 2021-04-02 PROCEDURE — 0002A COVID-19, PFIZER VACCINE 30MCG/0.3ML DOSE: CPT | Performed by: NURSE PRACTITIONER

## 2021-04-02 PROCEDURE — 91300 COVID-19, PFIZER VACCINE 30MCG/0.3ML DOSE: CPT | Performed by: NURSE PRACTITIONER

## 2021-04-06 ENCOUNTER — NURSE ONLY (OUTPATIENT)
Dept: PRIMARY CARE CLINIC | Age: 61
End: 2021-04-06
Payer: COMMERCIAL

## 2021-04-06 DIAGNOSIS — Z23 HIGH PRIORITY FOR COVID-19 VIRUS VACCINATION: Primary | ICD-10-CM

## 2021-04-30 ENCOUNTER — OFFICE VISIT (OUTPATIENT)
Dept: FAMILY MEDICINE CLINIC | Age: 61
End: 2021-04-30
Payer: COMMERCIAL

## 2021-04-30 VITALS
DIASTOLIC BLOOD PRESSURE: 82 MMHG | SYSTOLIC BLOOD PRESSURE: 118 MMHG | TEMPERATURE: 98.4 F | WEIGHT: 254 LBS | BODY MASS INDEX: 37.62 KG/M2 | HEART RATE: 76 BPM | HEIGHT: 69 IN

## 2021-04-30 DIAGNOSIS — E89.0 POSTOPERATIVE HYPOTHYROIDISM: ICD-10-CM

## 2021-04-30 DIAGNOSIS — E78.49 OTHER HYPERLIPIDEMIA: ICD-10-CM

## 2021-04-30 DIAGNOSIS — I10 ESSENTIAL HYPERTENSION: ICD-10-CM

## 2021-04-30 DIAGNOSIS — Z00.00 WELL ADULT EXAM: Primary | ICD-10-CM

## 2021-04-30 DIAGNOSIS — R79.89 ABNORMAL CBC: ICD-10-CM

## 2021-04-30 PROCEDURE — 99396 PREV VISIT EST AGE 40-64: CPT | Performed by: FAMILY MEDICINE

## 2021-04-30 ASSESSMENT — ENCOUNTER SYMPTOMS
SHORTNESS OF BREATH: 0
CHEST TIGHTNESS: 0
NAUSEA: 0
TROUBLE SWALLOWING: 0
ABDOMINAL DISTENTION: 0
ABDOMINAL PAIN: 0
DIARRHEA: 0
COUGH: 0
VOMITING: 0
CONSTIPATION: 0
VOICE CHANGE: 0
SORE THROAT: 0
BLOOD IN STOOL: 0
EYE PAIN: 0

## 2021-04-30 ASSESSMENT — PATIENT HEALTH QUESTIONNAIRE - PHQ9
2. FEELING DOWN, DEPRESSED OR HOPELESS: 0
SUM OF ALL RESPONSES TO PHQ QUESTIONS 1-9: 0
SUM OF ALL RESPONSES TO PHQ QUESTIONS 1-9: 0

## 2021-04-30 NOTE — PATIENT INSTRUCTIONS
Continue the medicines  Do stay on low fat diet   See us in 6 months  Consider the shingle vaccine in the future  Do fasting blood work in 2-3 months

## 2021-04-30 NOTE — PROGRESS NOTES
(117.5 kg)    BP Readings from Last 3 Encounters:  04/30/21 : 118/82  02/28/20 : 126/78  12/19/19 : 130/72          Review of Systems   Constitutional: Negative for appetite change, chills, fever and unexpected weight change. HENT: Negative for ear pain, hearing loss, sore throat, tinnitus, trouble swallowing and voice change. Eyes: Negative for pain and visual disturbance. Respiratory: Negative for cough, chest tightness and shortness of breath. Cardiovascular: Negative for chest pain, palpitations and leg swelling. Occ pedal edema    Gastrointestinal: Negative for abdominal distention, abdominal pain, blood in stool, constipation, diarrhea, nausea and vomiting. No dysphagia no gerd    Genitourinary: Negative for difficulty urinating, dysuria and hematuria. Skin: Negative for rash. Neurological: Negative for headaches. Hematological: Negative for adenopathy. Does not bruise/bleed easily. Objective:   Physical Exam  Constitutional:       General: She is not in acute distress. Appearance: Normal appearance. She is well-developed. She is not ill-appearing or diaphoretic. HENT:      Head: Normocephalic and atraumatic. Right Ear: Tympanic membrane and ear canal normal.      Left Ear: Tympanic membrane and ear canal normal.      Nose: Nose normal.      Mouth/Throat:      Lips: Pink. Mouth: Mucous membranes are moist. No oral lesions. Pharynx: Oropharynx is clear. Uvula midline. Eyes:      General: No scleral icterus. Pupils: Pupils are equal, round, and reactive to light. Neck:      Musculoskeletal: Neck supple. Thyroid: No thyroid mass or thyromegaly. Cardiovascular:      Rate and Rhythm: Normal rate and regular rhythm. Heart sounds: Normal heart sounds. No murmur. No friction rub. No gallop. Comments:   No edema.  bilateral varicose veins legs  Pulmonary:      Effort: Pulmonary effort is normal. No tachypnea, accessory muscle usage or respiratory distress. Breath sounds: Normal breath sounds. No decreased breath sounds, wheezing, rhonchi or rales. Abdominal:      General: Bowel sounds are normal. There is no distension or abdominal bruit. Palpations: Abdomen is soft. There is no hepatomegaly, splenomegaly, mass or pulsatile mass. Tenderness: There is no abdominal tenderness. There is no guarding. Lymphadenopathy:      Head:      Right side of head: No submental, submandibular, preauricular or posterior auricular adenopathy. Left side of head: No submental, submandibular, preauricular or posterior auricular adenopathy. Cervical: No cervical adenopathy. Upper Body:      Right upper body: No supraclavicular adenopathy. Left upper body: No supraclavicular adenopathy. Skin:     General: Skin is warm and dry. Coloration: Skin is not pale. Nails: There is no clubbing. Neurological:      General: No focal deficit present. Mental Status: She is alert. Assessment:        Diagnosis Orders   1. Well adult exam     2. Essential hypertension  TSH without Reflex   3. Other hyperlipidemia  TSH without Reflex    Lipid Panel   4. Abnormal CBC  CBC Auto Differential   5.  Postoperative hypothyroidism  TSH without Reflex       Colon done on 8/8/17 dr Reema Deng polyps and recheck in 5 years   On cpap for the nitza for about 8 years   She gets her mammogram and she see the gyne   I reviewed cmp tsh cbc and lipid from outside lab and copied to chart       Plan:      Continue the medicines  Do stay on low fat diet   See us in 6 months  Consider the shingle vaccine in the future  Do fasting blood work in 2-3 months           Clarence Lange MD

## 2021-06-15 ENCOUNTER — OFFICE VISIT (OUTPATIENT)
Dept: FAMILY MEDICINE CLINIC | Age: 61
End: 2021-06-15
Payer: COMMERCIAL

## 2021-06-15 VITALS
SYSTOLIC BLOOD PRESSURE: 118 MMHG | BODY MASS INDEX: 36.73 KG/M2 | DIASTOLIC BLOOD PRESSURE: 82 MMHG | HEIGHT: 69 IN | WEIGHT: 248 LBS | TEMPERATURE: 98.1 F

## 2021-06-15 DIAGNOSIS — J01.01 ACUTE RECURRENT MAXILLARY SINUSITIS: Primary | ICD-10-CM

## 2021-06-15 PROCEDURE — 99213 OFFICE O/P EST LOW 20 MIN: CPT | Performed by: FAMILY MEDICINE

## 2021-06-15 RX ORDER — AMOXICILLIN AND CLAVULANATE POTASSIUM 875; 125 MG/1; MG/1
1 TABLET, FILM COATED ORAL 2 TIMES DAILY
Qty: 20 TABLET | Refills: 0 | Status: SHIPPED | OUTPATIENT
Start: 2021-06-15 | End: 2021-06-25

## 2021-06-15 RX ORDER — METHYLPREDNISOLONE 4 MG/1
TABLET ORAL
Qty: 1 KIT | Refills: 0 | Status: SHIPPED | OUTPATIENT
Start: 2021-06-15 | End: 2021-06-21

## 2021-06-15 NOTE — PROGRESS NOTES
Subjective:      Patient ID: Niranjan Hairston is a 61 y.o. female. Chief Complaint   Patient presents with    Allergies     bad allergies    Congestion     sinus, right earache        Patient presents with:   Allergies: bad allergies  Congestion: sinus, right earache    Eye itch and burn  Lot of discolored nasal d/c  Started some time back and has had antibiotic several times this past year  For sinus sx  She has right ear pain for 3 week   Watery eye  Dry cough  No fever  Her cheeks hurt both side  No tobacco use    She is using her allegra and also flonase     YOB: 1960    Date of Visit:  6/15/2021     -- Sulfa Antibiotics -- Rash    Current Outpatient Medications:  Fexofenadine HCl (ALLEGRA PO), Take by mouth, Disp: , Rfl:   levothyroxine (SYNTHROID) 137 MCG tablet, TAKE ONE TABLET BY MOUTH DAILY, Disp: 90 tablet, Rfl: 1  lisinopril-hydroCHLOROthiazide (PRINZIDE;ZESTORETIC) 20-25 MG per tablet, TAKE ONE TABLET BY MOUTH DAILY, Disp: 30 tablet, Rfl: 4  furosemide (LASIX) 20 MG tablet, TAKE ONE TABLET BY MOUTH DAILY (Patient taking differently: prn), Disp: 30 tablet, Rfl: 4  Progesterone Micronized (PROGESTERONE PO), Take 200 mg by mouth, Disp: , Rfl:   lansoprazole (PREVACID) 15 MG delayed release capsule, Take 15 mg by mouth daily, Disp: , Rfl:   Cyanocobalamin (VITAMIN B 12 PO), Take  by mouth., Disp: , Rfl:   Vitamin D (CHOLECALCIFEROL) 1000 UNITS CAPS capsule, Take 5,000 Units by mouth daily , Disp: , Rfl:     No current facility-administered medications for this visit.      ---------------------------               06/15/21                      0911         ---------------------------   BP:          118/82         Site:    Left Upper Arm     Position:     Sitting        Cuff Size:  Medium Adult      Temp:   98.1 °F (36.7 °C)   TempSrc:    Temporal        Weight: 248 lb (112.5 kg)   Height:  5' 9\" (1.753 m)   ---------------------------  Body mass index is mouth.     Dispense:  1 kit     Refill:  0       She indicates that she has had steroid use with the antibiotic in past and had a significant improvement with her sx and would like to use agian      Plan:      Use the antibiotic and take the steroid with food  See the ENT doctor for an opinion         Brianda Laurent MD

## 2021-07-27 ENCOUNTER — TELEPHONE (OUTPATIENT)
Dept: FAMILY MEDICINE CLINIC | Age: 61
End: 2021-07-27

## 2021-07-27 DIAGNOSIS — R60.9 SWELLING: ICD-10-CM

## 2021-07-27 RX ORDER — LISINOPRIL AND HYDROCHLOROTHIAZIDE 25; 20 MG/1; MG/1
1 TABLET ORAL DAILY
Qty: 90 TABLET | Refills: 1 | Status: SHIPPED | OUTPATIENT
Start: 2021-07-27 | End: 2022-01-24

## 2021-07-27 RX ORDER — FUROSEMIDE 20 MG/1
20 TABLET ORAL DAILY
Qty: 90 TABLET | Refills: 1 | Status: SHIPPED | OUTPATIENT
Start: 2021-07-27 | End: 2022-01-24

## 2021-07-27 NOTE — TELEPHONE ENCOUNTER
Done  Orders Placed This Encounter   Medications    lisinopril-hydroCHLOROthiazide (PRINZIDE;ZESTORETIC) 20-25 MG per tablet     Sig: Take 1 tablet by mouth daily     Dispense:  90 tablet     Refill:  1    furosemide (LASIX) 20 MG tablet     Sig: Take 1 tablet by mouth daily TAKE ONE TABLET BY MOUTH DAILY     Dispense:  90 tablet     Refill:  1

## 2021-07-27 NOTE — TELEPHONE ENCOUNTER
----- Message from [id number] sent at 7/27/2021 11:22 AM EDT -----  Subject: Refill Request    QUESTIONS  Name of Medication? lisinopril-hydroCHLOROthiazide (PRINZIDE;ZESTORETIC)   20-25 MG per tablet  Patient-reported dosage and instructions? 20-25MG. Once a day  How many days do you have left? 0  Preferred Pharmacy? [address] [facility]  Pharmacy phone number (if available)? [phone]  ---------------------------------------------------------------------------  --------------,  Name of Medication? furosemide (LASIX) 20 MG tablet  Patient-reported dosage and instructions? 20MG. Once a day  How many days do you have left? 0  Preferred Pharmacy? [address] [facility]  Pharmacy phone number (if available)? [phone]  Additional Information for Provider? Patient had Kristen Floress call on 7/14 about   getting these refilled but the office said they didn't have anything on   file about it. Nothing in her chart about it but has been out of each for   two weeks and would like to get a refill on each of them.   ---------------------------------------------------------------------------  --------------  CALL BACK INFO  What is the best way for the office to contact you? OK to leave message on   voicemail  Preferred Call Back Phone Number?  [phone]

## 2021-08-25 ENCOUNTER — HOSPITAL ENCOUNTER (OUTPATIENT)
Dept: WOMENS IMAGING | Age: 61
Discharge: HOME | End: 2021-08-25
Payer: COMMERCIAL

## 2021-08-25 DIAGNOSIS — Z12.31 VISIT FOR SCREENING MAMMOGRAM: ICD-10-CM

## 2021-08-25 PROCEDURE — 77063 BREAST TOMOSYNTHESIS BI: CPT

## 2021-09-22 ENCOUNTER — TELEPHONE (OUTPATIENT)
Dept: FAMILY MEDICINE CLINIC | Age: 61
End: 2021-09-22

## 2021-09-22 RX ORDER — LEVOTHYROXINE SODIUM 137 UG/1
TABLET ORAL
Qty: 30 TABLET | Refills: 0 | Status: SHIPPED | OUTPATIENT
Start: 2021-09-22 | End: 2021-10-25

## 2021-09-22 NOTE — TELEPHONE ENCOUNTER
----- Message from Claudy Auguste sent at 9/22/2021  3:14 PM EDT -----  Subject: Medication Problem    QUESTIONS  Name of Medication? levothyroxine (SYNTHROID) 137 MCG tablet  Patient-reported dosage and instructions? 137 mcg 1 tablet by mouth daily  What question or problem do you have with the medication? patient stated   she is having to contact office to update pcp to get script to go through   at [facility], and needs updated, but is currently out and needs to be   refilled . Pref[address] Pharmacy? [address], [address] [place] [address] [phone] - F [phone]  Pharmacy phone number (if available)? [phone]  Additional Information for Provider? n./a  ---------------------------------------------------------------------------  --------------  CALL BACK INFO  What is the best way for the office to contact you? OK to leave message on   voicemail, OK to respond with electronic message via [employer] portal (only   for patients who have registered [employer] account)  Preferred Call Back Phone Number? [phone]  ---------------------------------------------------------------------------  --------------  SCRIPT ANSWERS  Relationship to Patient?  Self

## 2021-10-25 RX ORDER — LEVOTHYROXINE SODIUM 137 UG/1
TABLET ORAL
Qty: 30 TABLET | Refills: 0 | Status: SHIPPED | OUTPATIENT
Start: 2021-10-25 | End: 2021-11-04

## 2021-11-02 DIAGNOSIS — I10 ESSENTIAL HYPERTENSION: ICD-10-CM

## 2021-11-02 DIAGNOSIS — R79.89 ABNORMAL CBC: ICD-10-CM

## 2021-11-02 DIAGNOSIS — E78.49 OTHER HYPERLIPIDEMIA: ICD-10-CM

## 2021-11-02 DIAGNOSIS — E89.0 POSTOPERATIVE HYPOTHYROIDISM: ICD-10-CM

## 2021-11-02 LAB
BASOPHILS ABSOLUTE: 0 K/UL (ref 0–0.2)
BASOPHILS RELATIVE PERCENT: 0.5 %
CHOLESTEROL, TOTAL: 193 MG/DL (ref 0–199)
EOSINOPHILS ABSOLUTE: 0.2 K/UL (ref 0–0.6)
EOSINOPHILS RELATIVE PERCENT: 2.5 %
HCT VFR BLD CALC: 46.7 % (ref 36–48)
HDLC SERPL-MCNC: 36 MG/DL (ref 40–60)
HEMOGLOBIN: 15.7 G/DL (ref 12–16)
LDL CHOLESTEROL CALCULATED: 128 MG/DL
LYMPHOCYTES ABSOLUTE: 1.9 K/UL (ref 1–5.1)
LYMPHOCYTES RELATIVE PERCENT: 22.6 %
MCH RBC QN AUTO: 31.7 PG (ref 26–34)
MCHC RBC AUTO-ENTMCNC: 33.6 G/DL (ref 31–36)
MCV RBC AUTO: 94.3 FL (ref 80–100)
MONOCYTES ABSOLUTE: 0.6 K/UL (ref 0–1.3)
MONOCYTES RELATIVE PERCENT: 7.1 %
NEUTROPHILS ABSOLUTE: 5.6 K/UL (ref 1.7–7.7)
NEUTROPHILS RELATIVE PERCENT: 67.3 %
PDW BLD-RTO: 13.6 % (ref 12.4–15.4)
PLATELET # BLD: 292 K/UL (ref 135–450)
PMV BLD AUTO: 7.9 FL (ref 5–10.5)
RBC # BLD: 4.95 M/UL (ref 4–5.2)
TRIGL SERPL-MCNC: 147 MG/DL (ref 0–150)
TSH SERPL DL<=0.05 MIU/L-ACNC: 7.08 UIU/ML (ref 0.27–4.2)
VLDLC SERPL CALC-MCNC: 29 MG/DL
WBC # BLD: 8.4 K/UL (ref 4–11)

## 2021-11-04 ENCOUNTER — OFFICE VISIT (OUTPATIENT)
Dept: FAMILY MEDICINE CLINIC | Age: 61
End: 2021-11-04
Payer: COMMERCIAL

## 2021-11-04 VITALS
DIASTOLIC BLOOD PRESSURE: 82 MMHG | HEART RATE: 72 BPM | WEIGHT: 247 LBS | HEIGHT: 69 IN | SYSTOLIC BLOOD PRESSURE: 136 MMHG | BODY MASS INDEX: 36.58 KG/M2 | TEMPERATURE: 97.7 F

## 2021-11-04 DIAGNOSIS — E78.49 OTHER HYPERLIPIDEMIA: ICD-10-CM

## 2021-11-04 DIAGNOSIS — I10 PRIMARY HYPERTENSION: Primary | ICD-10-CM

## 2021-11-04 DIAGNOSIS — E89.0 POSTOPERATIVE HYPOTHYROIDISM: ICD-10-CM

## 2021-11-04 PROCEDURE — 99214 OFFICE O/P EST MOD 30 MIN: CPT | Performed by: FAMILY MEDICINE

## 2021-11-04 RX ORDER — LEVOTHYROXINE SODIUM 0.15 MG/1
150 TABLET ORAL DAILY
Qty: 90 TABLET | Refills: 1 | Status: SHIPPED | OUTPATIENT
Start: 2021-11-04 | End: 2022-04-14

## 2021-11-04 ASSESSMENT — ENCOUNTER SYMPTOMS
VOMITING: 0
ABDOMINAL PAIN: 0
DIARRHEA: 0
BLOOD IN STOOL: 0
SORE THROAT: 0
ABDOMINAL DISTENTION: 0
SHORTNESS OF BREATH: 0
CHEST TIGHTNESS: 0
VOICE CHANGE: 0
NAUSEA: 0
CONSTIPATION: 0

## 2021-11-04 NOTE — PATIENT INSTRUCTIONS
Change the thyroid medicine to the new dose and strenght   Consider getting the shingle vaccine at some point in the future   See us back in about 6 months   Do a repeat thyroid test in 2 months

## 2021-11-04 NOTE — PROGRESS NOTES
Subjective:      Patient ID: Ignacia Campos is a 64 y.o. female. Chief Complaint   Patient presents with    6 Month Follow-Up     discuss lab results        Patient presents with:  6 Month Follow-Up: discuss lab results    She is well  She does note some fatigue going on for about 9 months    No fever no cold sx   She brings in paper work for the pe she had earlier in year    YOB: 1960    Date of Visit:  11/4/2021     -- Sulfa Antibiotics -- Rash    Current Outpatient Medications:  levothyroxine (SYNTHROID) 137 MCG tablet, TAKE ONE TABLET BY MOUTH DAILY, Disp: 30 tablet, Rfl: 0  lisinopril-hydroCHLOROthiazide (PRINZIDE;ZESTORETIC) 20-25 MG per tablet, Take 1 tablet by mouth daily, Disp: 90 tablet, Rfl: 1  furosemide (LASIX) 20 MG tablet, Take 1 tablet by mouth daily TAKE ONE TABLET BY MOUTH DAILY, Disp: 90 tablet, Rfl: 1  Fexofenadine HCl (ALLEGRA PO), Take by mouth, Disp: , Rfl:   Progesterone Micronized (PROGESTERONE PO), Take 200 mg by mouth, Disp: , Rfl:   lansoprazole (PREVACID) 15 MG delayed release capsule, Take 15 mg by mouth daily, Disp: , Rfl:   Cyanocobalamin (VITAMIN B 12 PO), Take  by mouth., Disp: , Rfl:   Vitamin D (CHOLECALCIFEROL) 1000 UNITS CAPS capsule, Take 5,000 Units by mouth daily , Disp: , Rfl:     No current facility-administered medications for this visit.      ---------------------------               11/04/21                      1340         ---------------------------   BP:          136/82         Site:    Left Upper Arm     Position:     Sitting        Cuff Size:   Large Adult      Pulse:         72           Temp:   97.7 °F (36.5 °C)   TempSrc:    Temporal        Weight:  247 lb (112 kg)    Height:  5' 9\" (1.753 m)   ---------------------------  Body mass index is 36.48 kg/m².      Wt Readings from Last 3 Encounters:   46 in waist  11/04/21 : 247 lb (112 kg)  06/15/21 : 248 lb (112.5 kg)  04/30/21 : 254 lb (115.2 kg)    BP Readings from Last 3 Encounters:  11/04/21 : 136/82  06/15/21 : 118/82  04/30/21 : 118/82            Review of Systems   Constitutional: Negative for appetite change, chills, fever and unexpected weight change. HENT: Negative for sore throat and voice change. Respiratory: Negative for chest tightness and shortness of breath. Cardiovascular: Negative for chest pain and palpitations. She will get some dependent edema in the right leg especially for many years and has seen vascular for this  as well    Gastrointestinal: Negative for abdominal distention, abdominal pain, blood in stool, constipation, diarrhea, nausea and vomiting. Genitourinary: Negative for difficulty urinating, dysuria and hematuria. Musculoskeletal: Negative for neck pain. Neurological: Negative for headaches. Objective:   Physical Exam  Constitutional:       General: She is not in acute distress. Appearance: Normal appearance. She is well-developed. She is not ill-appearing or diaphoretic. Eyes:      General: No scleral icterus. Neck:      Thyroid: No thyroid mass or thyromegaly. Cardiovascular:      Rate and Rhythm: Normal rate and regular rhythm. Heart sounds: Normal heart sounds. No murmur heard. No friction rub. No gallop. Pulmonary:      Effort: Pulmonary effort is normal. No tachypnea, accessory muscle usage or respiratory distress. Breath sounds: Normal breath sounds. No decreased breath sounds, wheezing, rhonchi or rales. Musculoskeletal:      Cervical back: Neck supple. Lymphadenopathy:      Cervical: No cervical adenopathy. Upper Body:      Right upper body: No supraclavicular adenopathy. Left upper body: No supraclavicular adenopathy. Skin:     General: Skin is warm and dry. Coloration: Skin is not pale. Neurological:      Mental Status: She is alert. Assessment:        Diagnosis Orders   1. Primary hypertension     2.  Postoperative hypothyroidism  TSH

## 2022-01-04 ENCOUNTER — VIRTUAL VISIT (OUTPATIENT)
Dept: FAMILY MEDICINE CLINIC | Age: 62
End: 2022-01-04
Payer: COMMERCIAL

## 2022-01-04 DIAGNOSIS — J01.90 ACUTE BACTERIAL SINUSITIS: Primary | ICD-10-CM

## 2022-01-04 DIAGNOSIS — B96.89 ACUTE BACTERIAL SINUSITIS: Primary | ICD-10-CM

## 2022-01-04 PROCEDURE — 99422 OL DIG E/M SVC 11-20 MIN: CPT | Performed by: NURSE PRACTITIONER

## 2022-01-04 RX ORDER — METHYLPREDNISOLONE 4 MG/1
TABLET ORAL
Qty: 1 KIT | Refills: 0 | Status: SHIPPED | OUTPATIENT
Start: 2022-01-04 | End: 2022-01-10

## 2022-01-04 RX ORDER — LEVOFLOXACIN 250 MG/1
250 TABLET ORAL DAILY
Qty: 7 TABLET | Refills: 0 | Status: SHIPPED | OUTPATIENT
Start: 2022-01-04 | End: 2022-01-11

## 2022-01-04 ASSESSMENT — ENCOUNTER SYMPTOMS
SHORTNESS OF BREATH: 0
SINUS PAIN: 1
SINUS PRESSURE: 1
ABDOMINAL PAIN: 0
COUGH: 0
WHEEZING: 0
TROUBLE SWALLOWING: 0
SORE THROAT: 0

## 2022-01-04 NOTE — PROGRESS NOTES
Ceci Hernandez (:  1960) is a 64 y.o. female,Established patient, here for evaluation of the following chief complaint(s): Sinus Problem ([facility] [phone] pt having sinus issues was given amoicilin and it didn't work ) and Other (W 247, H 5'9, T 98.6, P 85)         ASSESSMENT/PLAN:  1. Acute bacterial sinusitis  start medications as prescribed. Stay well hydrated, drink at least 64 oz of water a day. Keep nutrition up, high protein snacks and or protein drinks. Return if symptoms worsen or fail to improve. SUBJECTIVE/OBJECTIVE:  HPI   Presents today via [facility] for complaints of ongoing sinus infection for over a month. Treated with amoxicillin 2021, history of sinus infections and amoxicillin did not help, reports she needed a stronger antibiotic. Tenderness above and below eyes, ongoing nasal drainage. Denies cough or chest congestion. Denies abd pain. States she will be traveling for work in the near future and wants to make sure this is cleared up. Up to date on immunizations. PO intake is good. Review of Systems   Constitutional: Negative for activity change, fatigue and fever. HENT: Positive for congestion, sinus pressure and sinus pain. Negative for sneezing, sore throat and trouble swallowing. Respiratory: Negative for cough, shortness of breath and wheezing. Cardiovascular: Negative for chest pain, palpitations and leg swelling. Gastrointestinal: Negative for abdominal pain.        Patient-Reported Vitals 2022   Patient-Reported Weight 247   Patient-Reported Height 5'9   Patient-Reported Pulse -   Patient-Reported Temperature 98.6        Physical Exam    [INSTRUCTIONS:  \"[x]\" Indicates a positive item  \"[]\" Indicates a negative item  -- DELETE ALL ITEMS NOT EXAMINED]    Constitutional: [x] Appears well-developed and well-nourished [x] No apparent distress      [x] Abnormal - obese    Mental status: [x] Alert and awake  [x] Oriented to person/place/time [x] Able to

## 2022-01-22 DIAGNOSIS — R60.9 SWELLING: ICD-10-CM

## 2022-01-24 RX ORDER — LISINOPRIL AND HYDROCHLOROTHIAZIDE 25; 20 MG/1; MG/1
1 TABLET ORAL DAILY
Qty: 90 TABLET | Refills: 1 | Status: SHIPPED | OUTPATIENT
Start: 2022-01-24 | End: 2022-08-10 | Stop reason: SDUPTHER

## 2022-01-24 RX ORDER — FUROSEMIDE 20 MG/1
20 TABLET ORAL DAILY
Qty: 90 TABLET | Refills: 1 | Status: SHIPPED | OUTPATIENT
Start: 2022-01-24 | End: 2022-09-09

## 2022-01-24 RX ORDER — FUROSEMIDE 20 MG/1
TABLET ORAL
Qty: 30 TABLET | OUTPATIENT
Start: 2022-01-24

## 2022-01-24 RX ORDER — LISINOPRIL AND HYDROCHLOROTHIAZIDE 25; 20 MG/1; MG/1
TABLET ORAL
Qty: 30 TABLET | OUTPATIENT
Start: 2022-01-24

## 2022-01-24 NOTE — TELEPHONE ENCOUNTER
Med refilled    Orders Placed This Encounter   Medications    furosemide (LASIX) 20 MG tablet     Sig: Take 1 tablet by mouth daily TAKE ONE TABLET BY MOUTH DAILY     Dispense:  90 tablet     Refill:  1    lisinopril-hydroCHLOROthiazide (PRINZIDE;ZESTORETIC) 20-25 MG per tablet     Sig: Take 1 tablet by mouth daily     Dispense:  90 tablet     Refill:  1

## 2022-02-18 ENCOUNTER — TELEMEDICINE (OUTPATIENT)
Dept: FAMILY MEDICINE CLINIC | Age: 62
End: 2022-02-18
Payer: COMMERCIAL

## 2022-02-18 DIAGNOSIS — J01.90 ACUTE BACTERIAL SINUSITIS: Primary | ICD-10-CM

## 2022-02-18 DIAGNOSIS — B96.89 ACUTE BACTERIAL SINUSITIS: Primary | ICD-10-CM

## 2022-02-18 PROCEDURE — 99213 OFFICE O/P EST LOW 20 MIN: CPT | Performed by: INTERNAL MEDICINE

## 2022-02-18 RX ORDER — CEFUROXIME AXETIL 250 MG/1
250 TABLET ORAL 2 TIMES DAILY
Qty: 20 TABLET | Refills: 0 | Status: SHIPPED | OUTPATIENT
Start: 2022-02-18 | End: 2022-02-28

## 2022-02-18 RX ORDER — METHYLPREDNISOLONE 4 MG/1
TABLET ORAL
Qty: 1 KIT | Refills: 0 | Status: SHIPPED | OUTPATIENT
Start: 2022-02-18 | End: 2022-05-05

## 2022-02-18 ASSESSMENT — ENCOUNTER SYMPTOMS
RHINORRHEA: 1
SHORTNESS OF BREATH: 0
COUGH: 1
SINUS PRESSURE: 1
ABDOMINAL PAIN: 0

## 2022-02-18 NOTE — PROGRESS NOTES
2/18/2022    TELEHEALTH EVALUATION -- Audio/Visual (During QKDRS-44 public health emergency)    HPI:  Chief Complaint   Patient presents with    Sinusitis     ph. [phone]. patient c/o sinus infection since 1/20/2022; severe head congestion, yellow green nasal discharge, sinus pressure.   patient denies cough, sore throat, fever     Wilfredo Navarrete is a 64 y.o. female with the following history as recorded in Epic[employer]:  Patient Active Problem List    Diagnosis Date Noted    Acute non-recurrent frontal sinusitis 07/29/2016    Cellulitis of finger of right hand 07/29/2016    MELISSA (obstructive sleep apnea) 12/17/2015    HLD (hyperlipidemia) 09/24/2015    Bilateral leg edema 09/24/2015    Chronic venous hypertension with inflammation involving both sides 01/30/2015    Chronic venous insufficiency 01/30/2015    Swelling 01/30/2015    Family history of colonic polyps 10/22/2014    Menorrhagia 11/07/2011    Unilateral complete paralysis of vocal cord 09/01/2011    Dyspnea and respiratory abnormality 09/01/2011    Thyroid mass     GERD (gastroesophageal reflux disease)     Hypothyroidism     Allergic rhinitis     Hypertension     Dysuria 07/20/2010    Atopic rhinitis 02/04/2010    Skin disorder 07/13/2009    Rash 06/09/2009    Disorder of function of stomach 04/22/2009    Benign neoplasm of thyroid gland 08/14/2007    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 ([facility] [address] 07/27/2007     Current Outpatient Medications   Medication Sig Dispense Refill    furosemide (LASIX) 20 MG tablet Take 1 tablet by mouth daily TAKE ONE TABLET BY MOUTH DAILY 90 tablet 1    lisinopril-hydroCHLOROthiazide (PRINZIDE;ZESTORETIC) 20-25 MG per tablet Take 1 tablet by mouth daily 90 tablet 1    levothyroxine (SYNTHROID) 150 MCG tablet Take 1 tablet by mouth Daily 90 tablet 1    Fexofenadine HCl (ALLEGRA PO) Take by mouth      Progesterone Micronized (PROGESTERONE PO) Take 200 mg by mouth      lansoprazole (PREVACID) 15 MG delayed release capsule Take 15 mg by mouth daily      Cyanocobalamin (VITAMIN B 12 PO) Take  by mouth.  Vitamin D (CHOLECALCIFEROL) 1000 UNITS CAPS capsule Take 5,000 Units by mouth daily        No current facility-administered medications for this visit. Allergies: Sulfa antibiotics  Past Medical History:   Diagnosis Date    Allergic rhinitis     Chronic allergic rhinitis     Dyspepsia     Hypertension     Hypothyroidism      Past Surgical History:   Procedure Laterality Date    COLONOSCOPY     [facility]    THYROID SURGERY  8855/1080    VASCULAR SURGERY Bilateral      Family History   Problem Relation Age of Onset    Cancer Paternal Uncle         colon/liver    Cancer Maternal Grandmother         cervical    Cancer Maternal Grandfather         colon     Social History     Tobacco Use    Smoking status: Never Smoker    Smokeless tobacco: Never Used   Substance Use Topics    Alcohol use: Yes     Comment: social Mamadou Payton (:  1960) has requested an audio/video evaluation for the following concern(s):    Chief Complaint   Patient presents with    Sinusitis     ph. [phone]. patient c/o sinus infection since 2022; severe head congestion, yellow green nasal discharge, sinus pressure. patient denies cough, sore throat, fever       Review of Systems   Constitutional: Negative for chills, diaphoresis and fatigue. HENT: Positive for congestion, postnasal drip, rhinorrhea and sinus pressure. Respiratory: Positive for cough. Negative for shortness of breath. Cardiovascular: Negative for chest pain and palpitations. Gastrointestinal: Negative for abdominal pain. Prior to Visit Medications    Medication Sig Taking?  Authorizing Provider   furosemide (LASIX) 20 MG tablet Take 1 tablet by mouth daily TAKE ONE TABLET BY MOUTH DAILY Yes Kristi Pena MD   lisinopril-hydroCHLOROthiazide (PRINZIDE;ZESTORETIC) 20-25 MG per tablet Take 1 tablet by mouth daily Yes Jennifer Alatorre MD   levothyroxine (SYNTHROID) 150 MCG tablet Take 1 tablet by mouth Daily Yes Jennifer Alatorre MD   Fexofenadine HCl (ALLEGRA PO) Take by mouth Yes Historical Provider, MD   Progesterone Micronized (PROGESTERONE PO) Take 200 mg by mouth Yes Historical Provider, MD   lansoprazole (PREVACID) 15 MG delayed release capsule Take 15 mg by mouth daily Yes Historical Provider, MD   Cyanocobalamin (VITAMIN B 12 PO) Take  by mouth. Yes Historical Provider, MD   Vitamin D (CHOLECALCIFEROL) 1000 UNITS CAPS capsule Take 5,000 Units by mouth daily  Yes Historical Provider, MD       Social History     Tobacco Use    Smoking status: Never Smoker    Smokeless tobacco: Never Used   Substance Use Topics    Alcohol use: Yes     Comment: social    Drug use: No            PHYSICAL EXAMINATION:  [ INSTRUCTIONS:  \"[x]\" Indicates a positive item  \"[]\" Indicates a negative item  -- DELETE ALL ITEMS NOT EXAMINED]  Vital Signs: (As obtained by patient/caregiver or practitioner observation)    Blood pressure-  Heart rate-    Respiratory rate- 12   Temperature-  Pulse oximetry-     Constitutional: [x] Appears well-developed and well-nourished [x] No apparent distress      [] Abnormal-   Mental status  [x] Alert and awake  [x] Oriented to person/place/time []Able to follow commands      Eyes:  EOM    []  Normal  [] Abnormal-  Sclera  []  Normal  [] Abnormal -         Discharge []  None visible  [] Abnormal -    HENT:   [x] Normocephalic, atraumatic.   [] Abnormal   [] Mouth/Throat: Mucous membranes are moist.     External Ears [x] Normal  [] Abnormal-     Neck: [x] No visualized mass     Pulmonary/Chest: [x] Respiratory effort normal.  [] No visualized signs of difficulty breathing or respiratory distress        [] Abnormal-      Musculoskeletal:   [] Normal gait with no signs of ataxia         [x] Normal range of motion of neck        [] Abnormal- Neurological:        [x] No Facial Asymmetry (Cranial nerve 7 motor function) (limited exam to video visit)          [] No gaze palsy        [] Abnormal-         Skin:        [x] No significant exanthematous lesions or discoloration noted on facial skin         [] Abnormal-            Psychiatric:       [x] Normal Affect [] No Hallucinations        [] Abnormal-     Other pertinent observable physical exam findings-     ASSESSMENT/PLAN:   Diagnosis Orders   1. Acute bacterial sinusitis       Outpatient Encounter Medications as of 2/18/2022   Medication Sig Dispense Refill    cefUROXime (CEFTIN) 250 MG tablet Take 1 tablet by mouth 2 times daily for 10 days 20 tablet 0    methylPREDNISolone (MEDROL, AMANDA,) 4 MG tablet Take by mouth. 1 kit 0    furosemide (LASIX) 20 MG tablet Take 1 tablet by mouth daily TAKE ONE TABLET BY MOUTH DAILY 90 tablet 1    lisinopril-hydroCHLOROthiazide (PRINZIDE;ZESTORETIC) 20-25 MG per tablet Take 1 tablet by mouth daily 90 tablet 1    levothyroxine (SYNTHROID) 150 MCG tablet Take 1 tablet by mouth Daily 90 tablet 1    Fexofenadine HCl (ALLEGRA PO) Take by mouth      Progesterone Micronized (PROGESTERONE PO) Take 200 mg by mouth      lansoprazole (PREVACID) 15 MG delayed release capsule Take 15 mg by mouth daily      Cyanocobalamin (VITAMIN B 12 PO) Take  by mouth.  Vitamin D (CHOLECALCIFEROL) 1000 UNITS CAPS capsule Take 5,000 Units by mouth daily        No facility-administered encounter medications on file as of 2/18/2022. No orders of the defined types were placed in this encounter. Wilfredo Navarrete, was evaluated through a synchronous (real-time) audio-video encounter. The patient (or guardian if applicable) is aware that this is a billable service, which includes applicable co-pays. This Virtual Visit was conducted with patient's (and/or legal guardian's) consent.  The visit was conducted pursuant to the emergency declaration under the [address] and the National Emergencies Act, [address] [facility] waiver authority and the [employer] and [employer]ar General Act. Patient identification was verified, and a caregiver was present when appropriate. The patient was located at home in a state where the provider was licensed to provide care. Total time spent on this encounter: Not billed by time    --Lucas Joseph DO on 2/18/2022 at 9:46 AM    An electronic signature was used to authenticate this note.

## 2022-04-14 RX ORDER — LEVOTHYROXINE SODIUM 0.15 MG/1
TABLET ORAL
Qty: 30 TABLET | Refills: 0 | Status: SHIPPED | OUTPATIENT
Start: 2022-04-14 | End: 2022-05-05 | Stop reason: SDUPTHER

## 2022-04-20 ENCOUNTER — TELEPHONE (OUTPATIENT)
Dept: FAMILY MEDICINE CLINIC | Age: 62
End: 2022-04-20

## 2022-04-20 NOTE — TELEPHONE ENCOUNTER
----- Message from Meghann Melchor sent at 4/20/2022  9:42 AM EDT -----  Subject: Message to Provider    QUESTIONS  Information for Provider? patient states that she has tested positive for   covid. She is vaccinated and has booster but tested positive on 4/20/2022   She states she is very congested how should she treat this/ Or is there   anything she can do  ---------------------------------------------------------------------------  --------------  [address]  What is the best way for the office to contact you? OK to leave message on   voicemail  Preferred Call Back Phone Number? [phone]  ---------------------------------------------------------------------------  --------------  SCRIPT ANSWERS  Relationship to Patient?  Self

## 2022-05-02 DIAGNOSIS — E89.0 POSTOPERATIVE HYPOTHYROIDISM: ICD-10-CM

## 2022-05-03 LAB — TSH SERPL DL<=0.05 MIU/L-ACNC: 2.87 UIU/ML (ref 0.27–4.2)

## 2022-05-05 ENCOUNTER — HOSPITAL ENCOUNTER (OUTPATIENT)
Dept: CT IMAGING | Age: 62
Discharge: HOME | End: 2022-05-05
Payer: COMMERCIAL

## 2022-05-05 ENCOUNTER — OFFICE VISIT (OUTPATIENT)
Dept: FAMILY MEDICINE CLINIC | Age: 62
End: 2022-05-05
Payer: COMMERCIAL

## 2022-05-05 VITALS
HEIGHT: 69 IN | TEMPERATURE: 97.5 F | DIASTOLIC BLOOD PRESSURE: 76 MMHG | SYSTOLIC BLOOD PRESSURE: 118 MMHG | BODY MASS INDEX: 36.73 KG/M2 | HEART RATE: 80 BPM | WEIGHT: 248 LBS

## 2022-05-05 DIAGNOSIS — R10.32 LEFT LOWER QUADRANT ABDOMINAL PAIN: ICD-10-CM

## 2022-05-05 DIAGNOSIS — I10 PRIMARY HYPERTENSION: Primary | ICD-10-CM

## 2022-05-05 DIAGNOSIS — G47.33 OSA (OBSTRUCTIVE SLEEP APNEA): ICD-10-CM

## 2022-05-05 DIAGNOSIS — R79.89 ABNORMAL CBC: ICD-10-CM

## 2022-05-05 LAB
CREAT SERPL-MCNC: 0.8 MG/DL (ref 0.6–1.2)
GFR AFRICAN AMERICAN: >60
GFR NON-AFRICAN AMERICAN: >60

## 2022-05-05 PROCEDURE — 82565 ASSAY OF CREATININE: CPT

## 2022-05-05 PROCEDURE — 6360000004 HC RX CONTRAST MEDICATION: Performed by: FAMILY MEDICINE

## 2022-05-05 PROCEDURE — 36415 COLL VENOUS BLD VENIPUNCTURE: CPT

## 2022-05-05 PROCEDURE — 99214 OFFICE O/P EST MOD 30 MIN: CPT | Performed by: FAMILY MEDICINE

## 2022-05-05 PROCEDURE — 74177 CT ABD & PELVIS W/CONTRAST: CPT

## 2022-05-05 RX ORDER — LEVOTHYROXINE SODIUM 0.15 MG/1
150 TABLET ORAL DAILY
Qty: 90 TABLET | Refills: 1 | Status: SHIPPED | OUTPATIENT
Start: 2022-05-05 | End: 2022-10-31

## 2022-05-05 RX ORDER — AMOXICILLIN AND CLAVULANATE POTASSIUM 875; 125 MG/1; MG/1
1 TABLET, FILM COATED ORAL 2 TIMES DAILY
Qty: 20 TABLET | Refills: 0 | Status: SHIPPED | OUTPATIENT
Start: 2022-05-05 | End: 2022-05-15

## 2022-05-05 RX ADMIN — IOHEXOL 50 ML: 240 INJECTION, SOLUTION INTRATHECAL; INTRAVASCULAR; INTRAVENOUS; ORAL at 10:02

## 2022-05-05 RX ADMIN — IOPAMIDOL 100 ML: 755 INJECTION, SOLUTION INTRAVENOUS at 11:32

## 2022-05-05 SDOH — ECONOMIC STABILITY: TRANSPORTATION INSECURITY
IN THE PAST 12 MONTHS, HAS LACK OF TRANSPORTATION KEPT YOU FROM MEETINGS, WORK, OR FROM GETTING THINGS NEEDED FOR DAILY LIVING?: NO

## 2022-05-05 SDOH — ECONOMIC STABILITY: FOOD INSECURITY: WITHIN THE PAST 12 MONTHS, THE FOOD YOU BOUGHT JUST DIDN'T LAST AND YOU DIDN'T HAVE MONEY TO GET MORE.: NEVER TRUE

## 2022-05-05 SDOH — ECONOMIC STABILITY: TRANSPORTATION INSECURITY
IN THE PAST 12 MONTHS, HAS THE LACK OF TRANSPORTATION KEPT YOU FROM MEDICAL APPOINTMENTS OR FROM GETTING MEDICATIONS?: NO

## 2022-05-05 SDOH — ECONOMIC STABILITY: FOOD INSECURITY: WITHIN THE PAST 12 MONTHS, YOU WORRIED THAT YOUR FOOD WOULD RUN OUT BEFORE YOU GOT MONEY TO BUY MORE.: NEVER TRUE

## 2022-05-05 ASSESSMENT — PATIENT HEALTH QUESTIONNAIRE - PHQ9
SUM OF ALL RESPONSES TO PHQ QUESTIONS 1-9: 0
SUM OF ALL RESPONSES TO PHQ QUESTIONS 1-9: 0
2. FEELING DOWN, DEPRESSED OR HOPELESS: 0
SUM OF ALL RESPONSES TO PHQ QUESTIONS 1-9: 0
SUM OF ALL RESPONSES TO PHQ9 QUESTIONS 1 & 2: 0
SUM OF ALL RESPONSES TO PHQ QUESTIONS 1-9: 0
1. LITTLE INTEREST OR PLEASURE IN DOING THINGS: 0

## 2022-05-05 ASSESSMENT — SOCIAL DETERMINANTS OF HEALTH (SDOH): HOW HARD IS IT FOR YOU TO PAY FOR THE VERY BASICS LIKE FOOD, HOUSING, MEDICAL CARE, AND HEATING?: NOT HARD AT ALL

## 2022-05-05 NOTE — PROGRESS NOTES
Subjective:      Patient ID: Micaela Beyer is a 64 y.o. female. Chief Complaint   Patient presents with    6 Month Follow-Up     hypertension, lipids, thyroid - pt is not fasting        Patient presents with:  6 Month Follow-Up: hypertension, lipids, thyroid - pt is not fasting    Here for the above and to review testing she had that was not normal   Hx of diverticulitis  No fever  Some left lower quad pain for 24 hours  Sharp nothing seems to worsen  Used advil with some improvement  Having bm helps  No blood  Urine is passing well   No sx    No ha no cp no syncope no dizzy     She is seeing a gyne?  Alex Pinzon for hormone replacement on [facility] and she ordered the blood work     YOB: 1960    Date of Visit:  5/5/2022     -- Sulfa Antibiotics -- Rash    Current Outpatient Medications:  ZINC PO, Take by mouth, Disp: , Rfl:   levothyroxine (SYNTHROID) 150 MCG tablet, TAKE ONE TABLET BY MOUTH DAILY, Disp: 30 tablet, Rfl: 0  furosemide (LASIX) 20 MG tablet, Take 1 tablet by mouth daily TAKE ONE TABLET BY MOUTH DAILY, Disp: 90 tablet, Rfl: 1  lisinopril-hydroCHLOROthiazide (PRINZIDE;ZESTORETIC) 20-25 MG per tablet, Take 1 tablet by mouth daily, Disp: 90 tablet, Rfl: 1  Fexofenadine HCl (ALLEGRA PO), Take by mouth, Disp: , Rfl:   Progesterone Micronized (PROGESTERONE PO), Take 200 mg by mouth, Disp: , Rfl:   lansoprazole (PREVACID) 15 MG delayed release capsule, Take 15 mg by mouth daily, Disp: , Rfl:   Cyanocobalamin (VITAMIN B 12 PO), Take  by mouth., Disp: , Rfl:   Vitamin D (CHOLECALCIFEROL) 1000 UNITS CAPS capsule, Take 5,000 Units by mouth daily , Disp: , Rfl:     No current facility-administered medications for this visit.      ---------------------------               05/05/22                      0901         ---------------------------   BP:          118/76         Site:    Left Upper Arm     Position:     Sitting        Cuff Size:   Large Adult      Pulse: 80           Temp:   97.5 °F (36.4 °C)   TempSrc:    Temporal        Weight: 248 lb (112.5 kg)   Height:  5' 9\" (1.753 m)   ---------------------------  Body mass index is 36.62 kg/m². Wt Readings from Last 3 Encounters:  05/05/22 : 248 lb (112.5 kg)  11/04/21 : 247 lb (112 kg)  06/15/21 : 248 lb (112.5 kg)    BP Readings from Last 3 Encounters:  05/05/22 : 118/76  11/04/21 : 136/82  06/15/21 : 118/82        Review of Systems    Objective:   Physical Exam  Constitutional:       General: She is not in acute distress. Appearance: Normal appearance. She is well-developed. She is not ill-appearing or diaphoretic. Eyes:      General: No scleral icterus. Neck:      Thyroid: No thyroid mass or thyromegaly. Cardiovascular:      Rate and Rhythm: Normal rate and regular rhythm. Heart sounds: Normal heart sounds. No murmur heard. No friction rub. No gallop. Comments:     Pulmonary:      Effort: Pulmonary effort is normal. No tachypnea, accessory muscle usage or respiratory distress. Breath sounds: Normal breath sounds. No decreased breath sounds, wheezing, rhonchi or rales. Chest:   Breasts:      Right: No supraclavicular adenopathy. Left: No supraclavicular adenopathy. Abdominal:      General: Bowel sounds are normal. There is no distension or abdominal bruit. Palpations: Abdomen is soft. There is no hepatomegaly, splenomegaly, mass or pulsatile mass. Tenderness: There is abdominal tenderness in the left lower quadrant. There is no right CVA tenderness, left CVA tenderness, guarding or rebound. Comments: No marks on the abd    Musculoskeletal:      Cervical back: Normal range of motion. Lymphadenopathy:      Cervical: No cervical adenopathy. Upper Body:      Right upper body: No supraclavicular adenopathy. Left upper body: No supraclavicular adenopathy. Skin:     General: Skin is warm and dry. Coloration: Skin is not pale. Nails: There is no clubbing. Neurological:      Mental Status: She is alert. Assessment:        Diagnosis Orders   1. Primary hypertension  Creatinine   2. MELISSA (obstructive sleep apnea)     3. Abnormal CBC  CBC with Auto Differential   4. Left lower quadrant abdominal pain  Creatinine    CT ABDOMEN PELVIS W IV CONTRAST Additional Contrast? Radiologist Recommendation         She is using her cpap but it was recalled   She is due for another colon. We discussed and she is aware needs to see  She was seeing dr lizzy flores but he left  Discussed diverticulitis and that some are not treating same     Orders Placed This Encounter   Medications    levothyroxine (SYNTHROID) 150 MCG tablet     Sig: Take 1 tablet by mouth Daily     Dispense:  90 tablet     Refill:  1         Labs done by another doctor on 3/30/22 and reviewed with her today  Cbc and cmp and thyroid. Her cbc showed elevated hg and hct       Plan:       You are due for a colon check this year as it has been 5 years for the colon polyp check   Do get the ct of the abdomen  Call or go to ER if worsens  Do take fluids  Use the antibiotic   Do recheck the ABNORMAL BLOOD COUNT IN 4 TO 6 WEEK  Consider the shingle vaccine   See me in 3 months       See imaging note  Discussed with patient  augmentin sent in to her ted Toussaint MD

## 2022-07-11 ENCOUNTER — OFFICE VISIT (OUTPATIENT)
Dept: FAMILY MEDICINE CLINIC | Age: 62
End: 2022-07-11
Payer: COMMERCIAL

## 2022-07-11 VITALS
HEART RATE: 72 BPM | BODY MASS INDEX: 36.58 KG/M2 | WEIGHT: 247 LBS | TEMPERATURE: 97.8 F | HEIGHT: 69 IN | SYSTOLIC BLOOD PRESSURE: 120 MMHG | DIASTOLIC BLOOD PRESSURE: 74 MMHG

## 2022-07-11 DIAGNOSIS — M25.561 CHRONIC PAIN OF RIGHT KNEE: ICD-10-CM

## 2022-07-11 DIAGNOSIS — I10 PRIMARY HYPERTENSION: ICD-10-CM

## 2022-07-11 DIAGNOSIS — G89.29 CHRONIC PAIN OF RIGHT KNEE: ICD-10-CM

## 2022-07-11 DIAGNOSIS — G47.33 OSA (OBSTRUCTIVE SLEEP APNEA): ICD-10-CM

## 2022-07-11 DIAGNOSIS — E89.0 POSTOPERATIVE HYPOTHYROIDISM: ICD-10-CM

## 2022-07-11 DIAGNOSIS — Z01.818 PREOP EXAMINATION: Primary | ICD-10-CM

## 2022-07-11 PROCEDURE — 99212 OFFICE O/P EST SF 10 MIN: CPT | Performed by: FAMILY MEDICINE

## 2022-07-11 ASSESSMENT — ENCOUNTER SYMPTOMS
CONSTIPATION: 0
ABDOMINAL PAIN: 0
TROUBLE SWALLOWING: 0
VOMITING: 0
SORE THROAT: 0
EYE PAIN: 0
BACK PAIN: 0
SHORTNESS OF BREATH: 0
CHEST TIGHTNESS: 0
COUGH: 0
NAUSEA: 0
VOICE CHANGE: 0
BLOOD IN STOOL: 0
ABDOMINAL DISTENTION: 0
DIARRHEA: 0
ROS SKIN COMMENTS: NO LESIONS

## 2022-07-11 ASSESSMENT — PATIENT HEALTH QUESTIONNAIRE - PHQ9
SUM OF ALL RESPONSES TO PHQ QUESTIONS 1-9: 0
1. LITTLE INTEREST OR PLEASURE IN DOING THINGS: 0
SUM OF ALL RESPONSES TO PHQ QUESTIONS 1-9: 0
SUM OF ALL RESPONSES TO PHQ9 QUESTIONS 1 & 2: 0
2. FEELING DOWN, DEPRESSED OR HOPELESS: 0
SUM OF ALL RESPONSES TO PHQ QUESTIONS 1-9: 0
SUM OF ALL RESPONSES TO PHQ QUESTIONS 1-9: 0

## 2022-07-11 NOTE — PROGRESS NOTES
Subjective:      Patient ID: Josselin Arechiga is a 64 y.o. female. Chief Complaint   Patient presents with    Pre-op Exam     Right Knee Surgery on 7- by Dr. Cris Mustafa at [facility]. Patient presents with:  Pre-op Exam: Right Knee Surgery on 7- by Dr. Cris Mustafa at [facility]. She is to arthroscopic surgery for the right knee. Injured in January. Pain at times  She denies other concerns and is well     Allergy:   -- Sulfa Antibiotics -- Rash      height is 5' 9\" (1.753 m) and weight is 247 lb (112 kg). Her temporal temperature is 97.8 °F (36.6 °C). Her blood pressure is 120/74 and her pulse is 72. No tobacco hx. ETOH 2 drinks a week     Current Outpatient Medications:     levothyroxine (SYNTHROID) 150 MCG tablet, Take 1 tablet by mouth Daily    furosemide (LASIX) 20 MG tablet, Take 1 tablet by mouth daily TAKE ONE TABLET BY MOUTH DAILY    lisinopril-hydroCHLOROthiazide (PRINZIDE;ZESTORETIC) 20-25 MG per tablet, Take 1 tablet by mouth daily,     Fexofenadine HCl (ALLEGRA PO), Take by mouth    Progesterone Micronized (PROGESTERONE PO), Take 200 mg by mouth,    lansoprazole (PREVACID) 15 MG delayed release capsule, Take 15 mg by mouth daily    Cyanocobalamin (VITAMIN B 12 PO), Take  by mouth.   Vitamin D (CHOLECALCIFEROL) 1000 UNITS CAPS capsule, Take 5,000 Units by mouth daily     ZINC PO, Take by mouth,    Past Surgical History:  2009: COLONOSCOPY  08/18/2017: COLONOSCOPY      Comment:  polyp dr Kiran Montes De Oca repeat in 5 years  2020: KNEE SURGERY;  Left  1995: THROAT SURGERY      Comment:  VOCAL CORD SURGERY  5803/5131: THYROID SURGERY  No date: TONSILLECTOMY  No date: VASCULAR SURGERY; Bilateral    No problem with anesthesia     Past Medical History:  No date: Chronic allergic rhinitis  No date: Dyspepsia  No date: Hypertension  No date: Hypothyroidism      Immunization History  Administered            Date(s) Administered    COVID-Olista, PFIZER PURPLE top, DILUTE for use, (age 15 y+), 30mcg/0.3mL                          03/12/2021 04/02/2021 11/04/2021      Influenza Vaccine, unspecified formulation                          11/01/2016      Influenza Virus Vaccine                          11/01/2018      Influenza Whole       11/04/2013      Influenza, Quadv, Recombinant, IM PF (Flublok 18 yrs and older)                          11/18/2019 09/25/2020 09/30/2021      Tdap (Boostrix, Adacel)                          10/11/2016                Review of Systems   Constitutional: Negative for appetite change, chills, fever and unexpected weight change. HENT: Negative for sore throat, trouble swallowing and voice change. No loose teeth front teeth no cap/crown. Permanent retainer upper and lower    Eyes: Negative for pain and visual disturbance. Respiratory: Negative for cough, chest tightness and shortness of breath. Cardiovascular: Negative for chest pain, palpitations and leg swelling. No hx of heart disease. 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 No gerd no dysphagia no hx of hepatitis   Genitourinary: Negative for difficulty urinating, dysuria and hematuria. Musculoskeletal: Negative for back pain and neck pain. Skin: Negative for rash. No lesions   Neurological: Negative for dizziness, syncope and headaches. Hematological: Negative for adenopathy. Does not bruise/bleed easily. No hx of blood clot       Objective:   Physical Exam  Constitutional:       General: She is not in acute distress. Appearance: Normal appearance. She is well-developed. She is not ill-appearing or diaphoretic. HENT:      Head: Normocephalic and atraumatic.       Right Ear: Tympanic membrane and ear canal normal.      Left Ear: Tympanic membrane and ear canal normal.      Nose: Nose normal.      Mouth/Throat:      Lips: Pink. Mouth: Mucous membranes are moist. No oral lesions. Pharynx: Oropharynx is clear. Uvula midline. Eyes:      General: No scleral icterus. Pupils: Pupils are equal, round, and reactive to light. Neck:      Thyroid: No thyromegaly. Cardiovascular:      Rate and Rhythm: Normal rate and regular rhythm. Heart sounds: Normal heart sounds. No murmur heard. No friction rub. No gallop. Comments:     Pulmonary:      Effort: Pulmonary effort is normal. No tachypnea, accessory muscle usage or respiratory distress. Breath sounds: Normal breath sounds. No decreased breath sounds, wheezing, rhonchi or rales. Chest:   Breasts:      Right: No supraclavicular adenopathy. Left: No supraclavicular adenopathy. Abdominal:      General: Bowel sounds are normal. There is no distension or abdominal bruit. Palpations: Abdomen is soft. There is no hepatomegaly, splenomegaly, mass or pulsatile mass. Tenderness: There is no abdominal tenderness. There is no guarding. Musculoskeletal:      Cervical back: Neck supple. Lymphadenopathy:      Head:      Right side of head: No submental or submandibular adenopathy. Left side of head: No submental or submandibular adenopathy. Cervical: No cervical adenopathy. Upper Body:      Right upper body: No supraclavicular adenopathy. Left upper body: No supraclavicular adenopathy. Skin:     General: Skin is warm and dry. Coloration: Skin is not pale. Nails: There is no clubbing. Neurological:      General: No focal deficit present. Mental Status: She is alert. Assessment:        Diagnosis Orders   1. Preop examination  Basic Metabolic Panel    EKG 12 Lead   2. Chronic pain of right knee     3. Primary hypertension  Basic Metabolic Panel    EKG 12 Lead   4. Postoperative hypothyroidism     5.  MELISSA (obstructive sleep

## 2022-07-12 ENCOUNTER — HOSPITAL ENCOUNTER (OUTPATIENT)
Age: 62
Discharge: HOME | End: 2022-07-12
Payer: COMMERCIAL

## 2022-07-12 ENCOUNTER — TELEPHONE (OUTPATIENT)
Dept: FAMILY MEDICINE CLINIC | Age: 62
End: 2022-07-12

## 2022-07-12 DIAGNOSIS — Z01.818 PREOP EXAMINATION: ICD-10-CM

## 2022-07-12 DIAGNOSIS — I10 PRIMARY HYPERTENSION: ICD-10-CM

## 2022-07-12 DIAGNOSIS — R79.89 ABNORMAL CBC: ICD-10-CM

## 2022-07-12 LAB
ANION GAP SERPL CALCULATED.3IONS-SCNC: 13 MMOL/L (ref 3–16)
BASOPHILS ABSOLUTE: 0.1 K/UL (ref 0–0.2)
BASOPHILS RELATIVE PERCENT: 0.8 %
BUN BLDV-MCNC: 13 MG/DL (ref 7–20)
CALCIUM SERPL-MCNC: 9.3 MG/DL (ref 8.3–10.6)
CHLORIDE BLD-SCNC: 101 MMOL/L (ref 99–110)
CO2: 23 MMOL/L (ref 21–32)
CREAT SERPL-MCNC: 0.8 MG/DL (ref 0.6–1.2)
EKG ATRIAL RATE: 63 BPM
EKG DIAGNOSIS: NORMAL
EKG P AXIS: 31 DEGREES
EKG P-R INTERVAL: 156 MS
EKG Q-T INTERVAL: 420 MS
EKG QRS DURATION: 96 MS
EKG QTC CALCULATION (BAZETT): 429 MS
EKG R AXIS: 21 DEGREES
EKG T AXIS: 23 DEGREES
EKG VENTRICULAR RATE: 63 BPM
EOSINOPHILS ABSOLUTE: 0.1 K/UL (ref 0–0.6)
EOSINOPHILS RELATIVE PERCENT: 1.4 %
GFR AFRICAN AMERICAN: >60
GFR NON-AFRICAN AMERICAN: >60
GLUCOSE BLD-MCNC: 108 MG/DL (ref 70–99)
HCT VFR BLD CALC: 44.7 % (ref 36–48)
HEMOGLOBIN: 15.5 G/DL (ref 12–16)
LYMPHOCYTES ABSOLUTE: 1.9 K/UL (ref 1–5.1)
LYMPHOCYTES RELATIVE PERCENT: 20.6 %
MCH RBC QN AUTO: 33.2 PG (ref 26–34)
MCHC RBC AUTO-ENTMCNC: 34.7 G/DL (ref 31–36)
MCV RBC AUTO: 95.7 FL (ref 80–100)
MONOCYTES ABSOLUTE: 0.6 K/UL (ref 0–1.3)
MONOCYTES RELATIVE PERCENT: 7.1 %
NEUTROPHILS ABSOLUTE: 6.4 K/UL (ref 1.7–7.7)
NEUTROPHILS RELATIVE PERCENT: 70.1 %
PDW BLD-RTO: 14.3 % (ref 12.4–15.4)
PLATELET # BLD: 312 K/UL (ref 135–450)
PMV BLD AUTO: 8 FL (ref 5–10.5)
POTASSIUM SERPL-SCNC: 4 MMOL/L (ref 3.5–5.1)
RBC # BLD: 4.66 M/UL (ref 4–5.2)
SODIUM BLD-SCNC: 137 MMOL/L (ref 136–145)
WBC # BLD: 9.2 K/UL (ref 4–11)

## 2022-07-12 PROCEDURE — 93010 ELECTROCARDIOGRAM REPORT: CPT | Performed by: INTERNAL MEDICINE

## 2022-07-12 PROCEDURE — 93005 ELECTROCARDIOGRAM TRACING: CPT

## 2022-07-12 NOTE — TELEPHONE ENCOUNTER
Question regarding CBC WITH AUTO DIFFERENTIAL    Particbrenda Villagomez MD 3 minutes ago (4:26 PM)     Hello, please advise which of the results shows my potassium levels as required for my pre-op. This test was requested in May by you to test again in June/July. I also gave her the potassium test received yesterday. I let her know that I needed the potassium test and she said this test ordered in May would show the potassium. Please advise. Thank you!  Matty Galan

## 2022-08-11 RX ORDER — LISINOPRIL AND HYDROCHLOROTHIAZIDE 25; 20 MG/1; MG/1
1 TABLET ORAL DAILY
Qty: 90 TABLET | Refills: 1 | Status: SHIPPED | OUTPATIENT
Start: 2022-08-11

## 2022-08-29 ENCOUNTER — HOSPITAL ENCOUNTER (OUTPATIENT)
Dept: WOMENS IMAGING | Age: 62
Discharge: HOME | End: 2022-08-29
Payer: COMMERCIAL

## 2022-08-29 DIAGNOSIS — Z12.31 BREAST CANCER SCREENING BY MAMMOGRAM: ICD-10-CM

## 2022-08-29 PROCEDURE — 77063 BREAST TOMOSYNTHESIS BI: CPT

## 2022-09-08 DIAGNOSIS — R60.9 SWELLING: ICD-10-CM

## 2022-09-09 RX ORDER — FUROSEMIDE 20 MG/1
TABLET ORAL
Qty: 90 TABLET | Refills: 1 | Status: SHIPPED | OUTPATIENT
Start: 2022-09-09

## 2022-10-01 ENCOUNTER — APPOINTMENT (OUTPATIENT)
Dept: GENERAL RADIOLOGY | Age: 62
End: 2022-10-01
Payer: COMMERCIAL

## 2022-10-01 ENCOUNTER — HOSPITAL ENCOUNTER (EMERGENCY)
Age: 62
Discharge: HOME | End: 2022-10-01
Attending: EMERGENCY MEDICINE
Payer: COMMERCIAL

## 2022-10-01 VITALS
HEART RATE: 80 BPM | WEIGHT: 240 LBS | HEIGHT: 69 IN | DIASTOLIC BLOOD PRESSURE: 78 MMHG | SYSTOLIC BLOOD PRESSURE: 149 MMHG | RESPIRATION RATE: 16 BRPM | TEMPERATURE: 98.6 F | BODY MASS INDEX: 35.55 KG/M2 | OXYGEN SATURATION: 95 %

## 2022-10-01 DIAGNOSIS — M54.50 ACUTE RIGHT-SIDED LOW BACK PAIN WITHOUT SCIATICA: Primary | ICD-10-CM

## 2022-10-01 DIAGNOSIS — R03.0 ELEVATED BLOOD PRESSURE READING: ICD-10-CM

## 2022-10-01 PROCEDURE — 72100 X-RAY EXAM L-S SPINE 2/3 VWS: CPT

## 2022-10-01 PROCEDURE — 99283 EMERGENCY DEPT VISIT LOW MDM: CPT

## 2022-10-01 RX ORDER — METHYLPREDNISOLONE 4 MG/1
TABLET ORAL
Qty: 1 KIT | Refills: 0 | Status: SHIPPED | OUTPATIENT
Start: 2022-10-01

## 2022-10-01 RX ORDER — ACETAMINOPHEN 500 MG
500 TABLET ORAL EVERY 6 HOURS PRN
Qty: 30 TABLET | Refills: 0 | Status: SHIPPED | OUTPATIENT
Start: 2022-10-01

## 2022-10-01 ASSESSMENT — PAIN DESCRIPTION - PAIN TYPE: TYPE: ACUTE PAIN

## 2022-10-01 ASSESSMENT — PAIN - FUNCTIONAL ASSESSMENT
PAIN_FUNCTIONAL_ASSESSMENT: 0-10
PAIN_FUNCTIONAL_ASSESSMENT: 0-10

## 2022-10-01 ASSESSMENT — PAIN SCALES - GENERAL
PAINLEVEL_OUTOF10: 8
PAINLEVEL_OUTOF10: 8

## 2022-10-01 ASSESSMENT — PAIN DESCRIPTION - DESCRIPTORS: DESCRIPTORS: ACHING

## 2022-10-01 ASSESSMENT — PAIN DESCRIPTION - LOCATION: LOCATION: BACK

## 2022-10-01 ASSESSMENT — PAIN DESCRIPTION - ORIENTATION: ORIENTATION: LOWER

## 2022-10-01 NOTE — ED PROVIDER NOTES
[address] [address] [address] COMPLAINT    Chief Complaint   Patient presents with    Back Pain     Pt states she has had back pain for approx 6 weeks since having a knee replacement and physical therapy. Pt states the last week pain has been worse. HPI    Elisa Benson is a 58 y.o. female who presents with back pain, localized in the lower right region of the back. The onset was mid August.. The duration has been intermittent since the onset. The quality of the pain is sharp. The pain worsens with movement. The context is this patient states she had a right knee surgery in mid August.  She states during physical therapy she started developing lower back pain in the right lower portion of her back. Its been coming and going but has been much worse over the last week. She rates the pain an 8 out of 10 now. No radiation of the pain. No history of trauma. . Patient denies history of malignancy, IV drug use, or recent back / spine surgery. No history of uncontrolled diabetes, HIV or immunosuppressant therapy. REVIEW OF SYSTEMS    General: No fevers, chills or night sweats, No weight loss    GI: No abdominal pain or vomiting    : No dysuria or hematuria    Musculoskeletal: see HPI, No unrelenting pain or night pain    Neurologic: No bowel or bladder incontinence, No saddle anesthesia, No leg weakness    All other systems reviewed and are negative.     PAST MEDICAL & SURGICAL HISTORY    Past Medical History:   Diagnosis Date    Allergic rhinitis     Chronic allergic rhinitis     Dyspepsia     Hypertension     Hypothyroidism        Past Surgical History:   Procedure Laterality Date    COLONOSCOPY  2009    COLONOSCOPY  08/18/2017    polyp dr Earl Hatch repeat in 5 years    KNEE SURGERY Left 2020    [address]    THYROID SURGERY  8386/3789    TONSILLECTOMY      VASCULAR SURGERY Bilateral        CURRENT MEDICATIONS (may include discharge medications prescribed in the ED)    Current Outpatient Rx   Medication Sig Dispense Refill    acetaminophen (TYLENOL) 500 MG tablet Take 1 tablet by mouth every 6 hours as needed for Pain 30 tablet 0    methylPREDNISolone (MEDROL, AMANDA,) 4 MG tablet Take by mouth. 1 kit 0    furosemide (LASIX) 20 MG tablet TAKE ONE TABLET BY MOUTH DAILY 90 tablet 1    lisinopril-hydroCHLOROthiazide (PRINZIDE;ZESTORETIC) 20-25 MG per tablet Take 1 tablet by mouth in the morning. 90 tablet 1    levothyroxine (SYNTHROID) 150 MCG tablet Take 1 tablet by mouth Daily 90 tablet 1    Fexofenadine HCl (ALLEGRA PO) Take by mouth      Progesterone Micronized (PROGESTERONE PO) Take 200 mg by mouth      lansoprazole (PREVACID) 15 MG delayed release capsule Take 15 mg by mouth daily      Cyanocobalamin (VITAMIN B 12 PO) Take  by mouth. Vitamin D (CHOLECALCIFEROL) 1000 UNITS CAPS capsule Take 5,000 Units by mouth daily          ALLERGIES    Allergies   Allergen Reactions    Sulfa Antibiotics Rash       SOCIAL HISTORY    Social History     Socioeconomic History    Marital status:      Spouse name: None    Number of children: None    Years of education: None    Highest education level: None   Tobacco Use    Smoking status: Never    Smokeless tobacco: Never   Vaping Use    Vaping Use: Never used   Substance and Sexual Activity    Alcohol use: Yes     Comment: social. 2 times a week     Drug use: No     Social Determinants of Health     Financial Resource Strain: Low Risk     Difficulty of Paying Living Expenses: Not hard at all   Food Insecurity: No Food Insecurity    Worried About Running Out of Food in the Last Year: Never true    Ran Out of Food in the Last Year: Never true   Transportation Needs: No Transportation Needs    Lack of Transportation (Medical): No    Lack of Transportation (Non-Medical):  No       PHYSICAL EXAM    VITAL SIGNS: BP (!) 149/78   Pulse 80   Temp 98.6 °F (37 °C) (Oral)   Resp 16   Ht 5' 9\" (1.753 m)   Wt 240 lb (108.9 kg)   SpO2 95% BMI 35.44 kg/m²     Constitutional: Well developed, well nourished, no acute distress, non-toxic appearing    HENT: Atraumatic, moist mucus membranes    Neck: No JVD, supple    Respiratory: No respiratory distress, normal breath sounds    Cardiovascular: regular rate, no murmurs    GI: Soft, nontender, no pulsatile masses or bruits of the abdomen    Musculoskeletal: No edema, no acute deformities    Back: + paralumbar tenderness to palpation, no bony midline tenderness, negative straight leg raise test bilaterally    Integument: Well hydrated, no rash    Vascular: Dorsalis pedis pulses are 2+ and equal bilaterally    Neurologic: L4/L5/S1 motor 5/5 bilaterally, patellar and achilles reflexes are 2+ and equal bilaterally, sensation to light touch is intact in the groin and lower extremities bilaterally    RADIOLOGY    XR LUMBAR SPINE (2-3 VIEWS)   Final Result   Dextroconvex curvature and multilevel degenerative changes of the lumbar   spine without evidence of acute osseous abnormality. ED COURSE & MEDICAL DECISION MAKING    Please see EMR for medications administered in the ED. Differential Diagnosis: Spinal Epidural Abscess, Vertebral Osteomyelitis, Cauda Equina Syndrome, Spinal Cord Compression, Conus Medullaris Syndrome, ruptured/dissecting Abdominal Aortic Aneurysm, Metastases to the back, Kidney Stone, Pyelonephritis, other    Patient presenting with complaint of back pain for just over 6 weeks. Patient denies fever, chills, weight loss, worsening of pain at night, unrelenting pain at rest, bowel or bladder retention/incontinence, saddle anesthesia, leg weakness, IV drug use, malignancy, or recent GI/ procedure. No history of immunocompromise (uncontrolled diabetes, chronic steroid/immunosuppressant therapy). On exam, patient is afebrile and nontoxic in appearance with unremarkable vital signs.  No evidence of neurological deficit on physical exam.    Xray: Impression: Dextroconvex curvature and multilevel degenerative changes of the lumbar spine without evidence of acute osseous abnormality. Due to the unremarkable history and lack of systemic complaints or atypical features, as well as the absence of neurological deficit, I have low suspicion at this time for epidural compression syndrome (spinal cord compression, cauda equina syndrome, conus medullaris syndrome) or infectious etiology (epidural abscess, vertebral osteomyelitis). Additionally, I have low suspicion for ruptured/dissecting abdominal aortic aneurysm, kidney stone or pyelonephritis. Patient's pain is most likely musculoskeletal. I believe the patient is safe for discharge at this time. I will provide symptomatic medications and recommend outpatient follow-up. The patient was instructed to follow up as an outpatient in one week with primary care. The patient was instructed to return to the ED immediately for any new or worsening symptoms, including bowel or bladder incontinence, saddle anesthesia or weakness of the legs. The patient verbalized understanding. FINAL IMPRESSION    1. Acute right-sided low back pain without sciatica    2.  Elevated blood pressure reading        PLAN    Discharge with outpatient follow-up (see EMR)    (Please note that this note was completed with a voice recognition program. Every attempt was made to edit the dictations, but inevitably there remain words that are mis-transcribed.)        Volney Landau, MD  10/01/22 7335

## 2022-10-31 RX ORDER — LEVOTHYROXINE SODIUM 0.15 MG/1
TABLET ORAL
Qty: 30 TABLET | Refills: 5 | Status: SHIPPED | OUTPATIENT
Start: 2022-10-31

## 2022-11-07 LAB
APTT: 26.2 SEC (ref 23–34.3)
BILIRUBIN URINE: NEGATIVE
BLOOD, URINE: NEGATIVE
CLARITY: CLEAR
COLOR: YELLOW
GLUCOSE URINE: NEGATIVE MG/DL
INR BLD: 0.94 (ref 0.87–1.14)
KETONES, URINE: NEGATIVE MG/DL
LEUKOCYTE ESTERASE, URINE: NEGATIVE
MICROSCOPIC EXAMINATION: NORMAL
NITRITE, URINE: NEGATIVE
PH UA: 7 (ref 5–8)
PROTEIN UA: NEGATIVE MG/DL
PROTHROMBIN TIME: 12.5 SEC (ref 11.7–14.5)
SPECIFIC GRAVITY UA: 1.01 (ref 1–1.03)
URINE TYPE: NORMAL
UROBILINOGEN, URINE: 0.2 E.U./DL

## 2022-11-08 ENCOUNTER — OFFICE VISIT (OUTPATIENT)
Dept: FAMILY MEDICINE CLINIC | Age: 62
End: 2022-11-08
Payer: COMMERCIAL

## 2022-11-08 VITALS
WEIGHT: 250 LBS | DIASTOLIC BLOOD PRESSURE: 80 MMHG | HEART RATE: 72 BPM | HEIGHT: 69 IN | SYSTOLIC BLOOD PRESSURE: 122 MMHG | TEMPERATURE: 97.7 F | BODY MASS INDEX: 37.03 KG/M2

## 2022-11-08 DIAGNOSIS — I10 PRIMARY HYPERTENSION: ICD-10-CM

## 2022-11-08 DIAGNOSIS — G89.29 CHRONIC RIGHT-SIDED LOW BACK PAIN WITH RIGHT-SIDED SCIATICA: ICD-10-CM

## 2022-11-08 DIAGNOSIS — E89.0 POSTOPERATIVE HYPOTHYROIDISM: ICD-10-CM

## 2022-11-08 DIAGNOSIS — Z01.818 PREOP EXAMINATION: Primary | ICD-10-CM

## 2022-11-08 DIAGNOSIS — K21.00 GASTROESOPHAGEAL REFLUX DISEASE WITH ESOPHAGITIS, UNSPECIFIED WHETHER HEMORRHAGE: ICD-10-CM

## 2022-11-08 DIAGNOSIS — M54.41 CHRONIC RIGHT-SIDED LOW BACK PAIN WITH RIGHT-SIDED SCIATICA: ICD-10-CM

## 2022-11-08 DIAGNOSIS — G47.33 OSA (OBSTRUCTIVE SLEEP APNEA): ICD-10-CM

## 2022-11-08 LAB
ANION GAP SERPL CALCULATED.3IONS-SCNC: 13 MMOL/L (ref 3–16)
BUN BLDV-MCNC: 11 MG/DL (ref 7–20)
CALCIUM SERPL-MCNC: 10.2 MG/DL (ref 8.3–10.6)
CHLORIDE BLD-SCNC: 102 MMOL/L (ref 99–110)
CO2: 26 MMOL/L (ref 21–32)
CREAT SERPL-MCNC: 0.7 MG/DL (ref 0.6–1.2)
GFR SERPL CREATININE-BSD FRML MDRD: >60 ML/MIN/{1.73_M2}
GLUCOSE BLD-MCNC: 95 MG/DL (ref 70–99)
HCT VFR BLD CALC: 49.3 % (ref 36–48)
HEMOGLOBIN: 16.2 G/DL (ref 12–16)
MCH RBC QN AUTO: 32.4 PG (ref 26–34)
MCHC RBC AUTO-ENTMCNC: 32.8 G/DL (ref 31–36)
MCV RBC AUTO: 98.9 FL (ref 80–100)
MRSA SCREEN RT-PCR: NORMAL
PDW BLD-RTO: 14 % (ref 12.4–15.4)
PLATELET # BLD: 320 K/UL (ref 135–450)
PMV BLD AUTO: 7.7 FL (ref 5–10.5)
POTASSIUM SERPL-SCNC: 3.9 MMOL/L (ref 3.5–5.1)
RBC # BLD: 4.99 M/UL (ref 4–5.2)
SODIUM BLD-SCNC: 141 MMOL/L (ref 136–145)
URINE CULTURE, ROUTINE: NORMAL
WBC # BLD: 11.9 K/UL (ref 4–11)

## 2022-11-08 PROCEDURE — 3078F DIAST BP <80 MM HG: CPT | Performed by: FAMILY MEDICINE

## 2022-11-08 PROCEDURE — 99213 OFFICE O/P EST LOW 20 MIN: CPT | Performed by: FAMILY MEDICINE

## 2022-11-08 PROCEDURE — 3074F SYST BP LT 130 MM HG: CPT | Performed by: FAMILY MEDICINE

## 2022-11-08 ASSESSMENT — ENCOUNTER SYMPTOMS
ABDOMINAL PAIN: 0
CONSTIPATION: 0
VOICE CHANGE: 0
CHEST TIGHTNESS: 0
EYE PAIN: 0
TROUBLE SWALLOWING: 0
ABDOMINAL DISTENTION: 0
BLOOD IN STOOL: 0
VOMITING: 0
SHORTNESS OF BREATH: 0
SORE THROAT: 0
COUGH: 0
DIARRHEA: 0
NAUSEA: 0

## 2022-11-08 ASSESSMENT — PATIENT HEALTH QUESTIONNAIRE - PHQ9
SUM OF ALL RESPONSES TO PHQ QUESTIONS 1-9: 0
1. LITTLE INTEREST OR PLEASURE IN DOING THINGS: 0
SUM OF ALL RESPONSES TO PHQ9 QUESTIONS 1 & 2: 0
SUM OF ALL RESPONSES TO PHQ QUESTIONS 1-9: 0
SUM OF ALL RESPONSES TO PHQ QUESTIONS 1-9: 0
2. FEELING DOWN, DEPRESSED OR HOPELESS: 0
SUM OF ALL RESPONSES TO PHQ QUESTIONS 1-9: 0

## 2022-11-08 NOTE — PROGRESS NOTES
Subjective:      Patient ID: Kiran Luciano is a 58 y.o. female. Chief Complaint   Patient presents with    Pre-op Exam     Spine Surgery on 11- by Dr. Wileen Krabbe at [facility] [phone], [phone]        Patient presents with:  Pre-op Exam: Spine Surgery on 11- by Dr. Wileen Krabbe at [facility] [phone], [phone]    Here for the above  Chronic right lower back pain and down the right leg  She is well otherwise and no c/o    Allergies:   -- Sulfa Antibiotics -- Rash    No tobacco hx. ETOH about 2 drinks a week     height is 5' 9\" (1.753 m) and weight is 250 lb (113.4 kg). Her temporal temperature is 97.7 °F (36.5 °C). Her blood pressure is 122/80 and her pulse is 72. Current Outpatient Medications:   ·  levothyroxine (SYNTHROID) 150 MCG tablet, TAKE ONE TABLET BY MOUTH DAILY,  ·  acetaminophen (TYLENOL) 500 MG tablet, Take 1 tablet by mouth every 6 hours as needed for Pain  ·  furosemide (LASIX) 20 MG tablet, TAKE ONE TABLET BY MOUTH DAILY  ·  lisinopril-hydroCHLOROthiazide (PRINZIDE;ZESTORETIC) 20-25 MG per tablet, Take 1 tablet by mouth in the morning.,   ·  Fexofenadine HCl (ALLEGRA PO), Take by mouth  ·  Progesterone Micronized (PROGESTERONE PO), Take 200 mg by mouth,   ·  lansoprazole (PREVACID) 15 MG delayed release capsule, Take 15 mg by mouth daily  ·  Cyanocobalamin (VITAMIN B 12 PO), Take  by mouth. ·  Vitamin D (CHOLECALCIFEROL) 1000 UNITS CAPS capsule, Take 5,000 Units by mouth daily     Past Surgical History:  2009: COLONOSCOPY  08/18/2017: COLONOSCOPY      Comment:  polyp dr Scott Khan repeat in 5 years  2020: KNEE SURGERY; Left  07/2022: KNEE SURGERY;  Right      Comment:  arthroscopic  1995: THROAT SURGERY      Comment:  VOCAL CORD SURGERY  7950/3391: THYROID SURGERY  No date: TONSILLECTOMY  No date: VASCULAR SURGERY; Bilateral    No problems with anesthesia     Family hx  No bleeding problems  No anesthesia concerns    Immunization History  Administered Date(s) Administered    COVID-19, PFIZER PURPLE top, DILUTE for use, (age 15 y+), 30mcg/0.3mL                          03/12/2021 04/02/2021 11/04/2021      Influenza Vaccine, unspecified formulation                          11/01/2016      Influenza Virus Vaccine                          11/01/2018      Influenza Whole       11/04/2013      Influenza, FLUBLOK, (age 25 y+), PF, 0.5mL                          11/18/2019 09/25/2020 09/30/2021 11/06/2022      Tdap (Boostrix, Adacel)                          10/11/2016          Review of Systems   Constitutional:  Negative for appetite change, chills, fever and unexpected weight change. HENT:  Negative for ear pain, sore throat, trouble swallowing and voice change. No loose teeth. No front teeth with cap/crown   Eyes:  Negative for pain and visual disturbance. Respiratory:  Negative for cough, chest tightness and shortness of breath. Cardiovascular:  Negative for chest pain, palpitations and leg swelling. No hx of heart disease. Exercise until august. Walking no cp no sob  Does yard work no cp no sob  Can carry items up and down stairs, runs a vacuum, grocery shop push cart load and unload no sob or cp with these activities. Gastrointestinal:  Negative for abdominal distention, abdominal pain, blood in stool, constipation, diarrhea, nausea and vomiting. No hx of hepatitis. No dysphagia no gerd    Genitourinary:  Negative for difficulty urinating, dysuria and hematuria. Musculoskeletal:  Negative for neck pain. Skin:  Negative for rash. Neurological:  Negative for dizziness, seizures, syncope and headaches. Hematological:  Negative for adenopathy. Does not bruise/bleed easily. No hx of blood clot      Objective:   Physical Exam  Constitutional:       General: She is not in acute distress. Appearance: Normal appearance. She is well-developed. She is not ill-appearing or diaphoretic.    HENT: Head: Normocephalic and atraumatic. Right Ear: Tympanic membrane and ear canal normal.      Left Ear: Tympanic membrane and ear canal normal.      Nose: Nose normal.      Mouth/Throat:      Lips: Pink. Mouth: Mucous membranes are moist. No oral lesions. Pharynx: Oropharynx is clear. Uvula midline. Eyes:      General: No scleral icterus. Pupils: Pupils are equal, round, and reactive to light. Neck:      Thyroid: No thyroid mass or thyromegaly. Cardiovascular:      Rate and Rhythm: Normal rate and regular rhythm. Heart sounds: Normal heart sounds. No murmur heard. No friction rub. No gallop. Comments:     Pulmonary:      Effort: Pulmonary effort is normal. No tachypnea, accessory muscle usage or respiratory distress. Breath sounds: Normal breath sounds. No decreased breath sounds, wheezing, rhonchi or rales. Abdominal:      General: Bowel sounds are normal. There is no distension or abdominal bruit. Palpations: Abdomen is soft. There is no hepatomegaly, splenomegaly, mass or pulsatile mass. Tenderness: There is no abdominal tenderness. There is no guarding. Musculoskeletal:      Cervical back: Neck supple. Lymphadenopathy:      Head:      Right side of head: No submental, submandibular, preauricular or posterior auricular adenopathy. Left side of head: No submental, submandibular, preauricular or posterior auricular adenopathy. Cervical: No cervical adenopathy. Upper Body:      Right upper body: No supraclavicular adenopathy. Left upper body: No supraclavicular adenopathy. Skin:     General: Skin is warm and dry. Coloration: Skin is not pale. Nails: There is no clubbing. Neurological:      General: No focal deficit present. Mental Status: She is alert. Assessment:       Diagnosis Orders   1. Preop examination        2. Chronic right-sided low back pain with right-sided sciatica        3.  Primary hypertension 4. MELISSA (obstructive sleep apnea)        5. Postoperative hypothyroidism        6.  Gastroesophageal reflux disease with esophagitis, unspecified whether hemorrhage            Ok for surgery  Reviewed labs she had for this   Cbc and bmp ok  Ekg in July was ok  I did discuss the cbc with patient and will recheck at later date  Cbc just prior was normal   She is using her cpap machine       Plan:      Do consider the shingle vaccine  On the morning of the surgery you may take the prevacid and the thyroid medicine with just enough water to get them down as soon as you wake up   See me in 6 months         Natacha Samano MD

## 2022-11-08 NOTE — PATIENT INSTRUCTIONS
Do consider the shingle vaccine  On the morning of the surgery you may take the prevacid and the thyroid medicine with just enough water to get them down as soon as you wake up   See me in 6 months

## 2022-11-21 ENCOUNTER — HOSPITAL ENCOUNTER (INPATIENT)
Age: 62
LOS: 1 days | Discharge: HOME | DRG: 176 | End: 2022-11-22
Attending: EMERGENCY MEDICINE | Admitting: INTERNAL MEDICINE
Payer: COMMERCIAL

## 2022-11-21 ENCOUNTER — APPOINTMENT (OUTPATIENT)
Dept: CT IMAGING | Age: 62
DRG: 176 | End: 2022-11-21
Payer: COMMERCIAL

## 2022-11-21 DIAGNOSIS — I26.99 OTHER ACUTE PULMONARY EMBOLISM WITHOUT ACUTE COR PULMONALE (HCC): Primary | ICD-10-CM

## 2022-11-21 DIAGNOSIS — N30.00 ACUTE CYSTITIS WITHOUT HEMATURIA: ICD-10-CM

## 2022-11-21 LAB
ANION GAP SERPL CALCULATED.3IONS-SCNC: 14 MMOL/L (ref 3–16)
APTT: 55.6 SEC (ref 23–34.3)
BACTERIA: ABNORMAL /HPF
BASOPHILS ABSOLUTE: 0.1 K/UL (ref 0–0.2)
BASOPHILS RELATIVE PERCENT: 0.3 %
BILIRUBIN URINE: NEGATIVE
BLOOD, URINE: NEGATIVE
BUN BLDV-MCNC: 8 MG/DL (ref 7–20)
CALCIUM SERPL-MCNC: 9.4 MG/DL (ref 8.3–10.6)
CHLORIDE BLD-SCNC: 97 MMOL/L (ref 99–110)
CLARITY: ABNORMAL
CO2: 23 MMOL/L (ref 21–32)
COLOR: ABNORMAL
COMMENT UA: ABNORMAL
CREAT SERPL-MCNC: <0.5 MG/DL (ref 0.6–1.2)
EKG ATRIAL RATE: 76 BPM
EKG DIAGNOSIS: NORMAL
EKG P AXIS: 38 DEGREES
EKG P-R INTERVAL: 164 MS
EKG Q-T INTERVAL: 416 MS
EKG QRS DURATION: 88 MS
EKG QTC CALCULATION (BAZETT): 468 MS
EKG R AXIS: 18 DEGREES
EKG T AXIS: 6 DEGREES
EKG VENTRICULAR RATE: 76 BPM
EOSINOPHILS ABSOLUTE: 0.1 K/UL (ref 0–0.6)
EOSINOPHILS RELATIVE PERCENT: 1 %
EPITHELIAL CELLS, UA: ABNORMAL /HPF (ref 0–5)
GFR SERPL CREATININE-BSD FRML MDRD: >60 ML/MIN/{1.73_M2}
GLUCOSE BLD-MCNC: 98 MG/DL (ref 70–99)
GLUCOSE URINE: NEGATIVE MG/DL
HCT VFR BLD CALC: 44.3 % (ref 36–48)
HCT VFR BLD CALC: 47.3 % (ref 36–48)
HEMOGLOBIN: 14.9 G/DL (ref 12–16)
HEMOGLOBIN: 15.5 G/DL (ref 12–16)
KETONES, URINE: ABNORMAL MG/DL
LEUKOCYTE ESTERASE, URINE: ABNORMAL
LYMPHOCYTES ABSOLUTE: 1.9 K/UL (ref 1–5.1)
LYMPHOCYTES RELATIVE PERCENT: 12.3 %
MCH RBC QN AUTO: 32.2 PG (ref 26–34)
MCH RBC QN AUTO: 32.4 PG (ref 26–34)
MCHC RBC AUTO-ENTMCNC: 32.8 G/DL (ref 31–36)
MCHC RBC AUTO-ENTMCNC: 33.7 G/DL (ref 31–36)
MCV RBC AUTO: 95.8 FL (ref 80–100)
MCV RBC AUTO: 98.6 FL (ref 80–100)
MICROSCOPIC EXAMINATION: YES
MONOCYTES ABSOLUTE: 1.2 K/UL (ref 0–1.3)
MONOCYTES RELATIVE PERCENT: 8 %
NEUTROPHILS ABSOLUTE: 12 K/UL (ref 1.7–7.7)
NEUTROPHILS RELATIVE PERCENT: 78.4 %
NITRITE, URINE: NEGATIVE
PDW BLD-RTO: 13 % (ref 12.4–15.4)
PDW BLD-RTO: 13.5 % (ref 12.4–15.4)
PH UA: 6.5 (ref 5–8)
PLATELET # BLD: 313 K/UL (ref 135–450)
PLATELET # BLD: 337 K/UL (ref 135–450)
PMV BLD AUTO: 7.4 FL (ref 5–10.5)
PMV BLD AUTO: 7.5 FL (ref 5–10.5)
POTASSIUM REFLEX MAGNESIUM: 4.2 MMOL/L (ref 3.5–5.1)
PRO-BNP: 222 PG/ML (ref 0–124)
PROTEIN UA: 30 MG/DL
RBC # BLD: 4.63 M/UL (ref 4–5.2)
RBC # BLD: 4.79 M/UL (ref 4–5.2)
RBC UA: ABNORMAL /HPF (ref 0–4)
REASON FOR REJECTION: NORMAL
REJECTED TEST: NORMAL
SODIUM BLD-SCNC: 134 MMOL/L (ref 136–145)
SPECIFIC GRAVITY UA: 1.02 (ref 1–1.03)
TROPONIN: <0.01 NG/ML
URINE REFLEX TO CULTURE: YES
URINE TYPE: ABNORMAL
UROBILINOGEN, URINE: 1 E.U./DL
WBC # BLD: 14.4 K/UL (ref 4–11)
WBC # BLD: 15.3 K/UL (ref 4–11)
WBC UA: ABNORMAL /HPF (ref 0–5)

## 2022-11-21 PROCEDURE — 93005 ELECTROCARDIOGRAM TRACING: CPT | Performed by: PHYSICIAN ASSISTANT

## 2022-11-21 PROCEDURE — 96375 TX/PRO/DX INJ NEW DRUG ADDON: CPT

## 2022-11-21 PROCEDURE — 2580000003 HC RX 258: Performed by: PHYSICIAN ASSISTANT

## 2022-11-21 PROCEDURE — 6360000002 HC RX W HCPCS: Performed by: PHYSICIAN ASSISTANT

## 2022-11-21 PROCEDURE — 2500000003 HC RX 250 WO HCPCS: Performed by: PHYSICIAN ASSISTANT

## 2022-11-21 PROCEDURE — 87086 URINE CULTURE/COLONY COUNT: CPT

## 2022-11-21 PROCEDURE — 85025 COMPLETE CBC W/AUTO DIFF WBC: CPT

## 2022-11-21 PROCEDURE — 84484 ASSAY OF TROPONIN QUANT: CPT

## 2022-11-21 PROCEDURE — 2060000000 HC ICU INTERMEDIATE R&B

## 2022-11-21 PROCEDURE — 83880 ASSAY OF NATRIURETIC PEPTIDE: CPT

## 2022-11-21 PROCEDURE — 36415 COLL VENOUS BLD VENIPUNCTURE: CPT

## 2022-11-21 PROCEDURE — 80048 BASIC METABOLIC PNL TOTAL CA: CPT

## 2022-11-21 PROCEDURE — 85027 COMPLETE CBC AUTOMATED: CPT

## 2022-11-21 PROCEDURE — 96365 THER/PROPH/DIAG IV INF INIT: CPT

## 2022-11-21 PROCEDURE — 93010 ELECTROCARDIOGRAM REPORT: CPT | Performed by: INTERNAL MEDICINE

## 2022-11-21 PROCEDURE — 6360000002 HC RX W HCPCS: Performed by: INTERNAL MEDICINE

## 2022-11-21 PROCEDURE — 99285 EMERGENCY DEPT VISIT HI MDM: CPT

## 2022-11-21 PROCEDURE — 99222 1ST HOSP IP/OBS MODERATE 55: CPT | Performed by: INTERNAL MEDICINE

## 2022-11-21 PROCEDURE — 6360000004 HC RX CONTRAST MEDICATION: Performed by: PHYSICIAN ASSISTANT

## 2022-11-21 PROCEDURE — 81001 URINALYSIS AUTO W/SCOPE: CPT

## 2022-11-21 PROCEDURE — 85730 THROMBOPLASTIN TIME PARTIAL: CPT

## 2022-11-21 PROCEDURE — 71260 CT THORAX DX C+: CPT | Performed by: PHYSICIAN ASSISTANT

## 2022-11-21 RX ORDER — ACETAMINOPHEN 325 MG/1
650 TABLET ORAL EVERY 6 HOURS PRN
Status: DISCONTINUED | OUTPATIENT
Start: 2022-11-21 | End: 2022-11-22 | Stop reason: HOSPADM

## 2022-11-21 RX ORDER — OMEPRAZOLE 10 MG/1
10 CAPSULE, DELAYED RELEASE ORAL DAILY
COMMUNITY
End: 2022-11-21

## 2022-11-21 RX ORDER — POTASSIUM CHLORIDE 7.45 MG/ML
10 INJECTION INTRAVENOUS PRN
Status: DISCONTINUED | OUTPATIENT
Start: 2022-11-21 | End: 2022-11-22 | Stop reason: HOSPADM

## 2022-11-21 RX ORDER — HEPARIN SODIUM 1000 [USP'U]/ML
80 INJECTION, SOLUTION INTRAVENOUS; SUBCUTANEOUS PRN
Status: DISCONTINUED | OUTPATIENT
Start: 2022-11-21 | End: 2022-11-21

## 2022-11-21 RX ORDER — HEPARIN SODIUM 1000 [USP'U]/ML
40 INJECTION, SOLUTION INTRAVENOUS; SUBCUTANEOUS PRN
Status: DISCONTINUED | OUTPATIENT
Start: 2022-11-21 | End: 2022-11-21

## 2022-11-21 RX ORDER — CEPHALEXIN 250 MG/1
250 CAPSULE ORAL 4 TIMES DAILY
Status: ON HOLD | COMMUNITY
Start: 2022-11-18 | End: 2022-11-22 | Stop reason: HOSPADM

## 2022-11-21 RX ORDER — SODIUM CHLORIDE 9 MG/ML
INJECTION, SOLUTION INTRAVENOUS PRN
Status: DISCONTINUED | OUTPATIENT
Start: 2022-11-21 | End: 2022-11-22 | Stop reason: HOSPADM

## 2022-11-21 RX ORDER — HEPARIN SODIUM 1000 [USP'U]/ML
8900 INJECTION, SOLUTION INTRAVENOUS; SUBCUTANEOUS ONCE
Status: COMPLETED | OUTPATIENT
Start: 2022-11-21 | End: 2022-11-21

## 2022-11-21 RX ORDER — LEVOTHYROXINE SODIUM 0.07 MG/1
150 TABLET ORAL DAILY
Status: DISCONTINUED | OUTPATIENT
Start: 2022-11-22 | End: 2022-11-22 | Stop reason: HOSPADM

## 2022-11-21 RX ORDER — SODIUM CHLORIDE 0.9 % (FLUSH) 0.9 %
10 SYRINGE (ML) INJECTION EVERY 12 HOURS SCHEDULED
Status: DISCONTINUED | OUTPATIENT
Start: 2022-11-21 | End: 2022-11-22 | Stop reason: HOSPADM

## 2022-11-21 RX ORDER — PANTOPRAZOLE SODIUM 40 MG/1
40 TABLET, DELAYED RELEASE ORAL
Status: DISCONTINUED | OUTPATIENT
Start: 2022-11-22 | End: 2022-11-22 | Stop reason: HOSPADM

## 2022-11-21 RX ORDER — MAGNESIUM SULFATE IN WATER 40 MG/ML
2000 INJECTION, SOLUTION INTRAVENOUS PRN
Status: DISCONTINUED | OUTPATIENT
Start: 2022-11-21 | End: 2022-11-22 | Stop reason: HOSPADM

## 2022-11-21 RX ORDER — CHOLECALCIFEROL (VITAMIN D3) 125 MCG
500 CAPSULE ORAL DAILY
Status: DISCONTINUED | OUTPATIENT
Start: 2022-11-22 | End: 2022-11-22 | Stop reason: HOSPADM

## 2022-11-21 RX ORDER — ONDANSETRON 2 MG/ML
4 INJECTION INTRAMUSCULAR; INTRAVENOUS EVERY 6 HOURS PRN
Status: DISCONTINUED | OUTPATIENT
Start: 2022-11-21 | End: 2022-11-22 | Stop reason: HOSPADM

## 2022-11-21 RX ORDER — ACETAMINOPHEN 650 MG/1
650 SUPPOSITORY RECTAL EVERY 6 HOURS PRN
Status: DISCONTINUED | OUTPATIENT
Start: 2022-11-21 | End: 2022-11-22 | Stop reason: HOSPADM

## 2022-11-21 RX ORDER — HEPARIN SODIUM 1000 [USP'U]/ML
80 INJECTION, SOLUTION INTRAVENOUS; SUBCUTANEOUS ONCE
Status: DISCONTINUED | OUTPATIENT
Start: 2022-11-21 | End: 2022-11-21

## 2022-11-21 RX ORDER — HEPARIN SODIUM 1000 [USP'U]/ML
8000 INJECTION, SOLUTION INTRAVENOUS; SUBCUTANEOUS ONCE
Status: COMPLETED | OUTPATIENT
Start: 2022-11-21 | End: 2022-11-21

## 2022-11-21 RX ORDER — HYDROCHLOROTHIAZIDE 25 MG/1
25 TABLET ORAL DAILY
Status: DISCONTINUED | OUTPATIENT
Start: 2022-11-22 | End: 2022-11-22 | Stop reason: HOSPADM

## 2022-11-21 RX ORDER — LISINOPRIL 20 MG/1
20 TABLET ORAL DAILY
Status: DISCONTINUED | OUTPATIENT
Start: 2022-11-22 | End: 2022-11-22 | Stop reason: HOSPADM

## 2022-11-21 RX ORDER — SODIUM CHLORIDE 0.9 % (FLUSH) 0.9 %
10 SYRINGE (ML) INJECTION PRN
Status: DISCONTINUED | OUTPATIENT
Start: 2022-11-21 | End: 2022-11-22 | Stop reason: HOSPADM

## 2022-11-21 RX ORDER — HEPARIN SODIUM 10000 [USP'U]/100ML
5-30 INJECTION, SOLUTION INTRAVENOUS CONTINUOUS
Status: DISCONTINUED | OUTPATIENT
Start: 2022-11-21 | End: 2022-11-21

## 2022-11-21 RX ORDER — HEPARIN SODIUM 10000 [USP'U]/100ML
0-4000 INJECTION, SOLUTION INTRAVENOUS CONTINUOUS
Status: DISCONTINUED | OUTPATIENT
Start: 2022-11-21 | End: 2022-11-22 | Stop reason: HOSPADM

## 2022-11-21 RX ORDER — POTASSIUM CHLORIDE 20 MEQ/1
40 TABLET, EXTENDED RELEASE ORAL PRN
Status: DISCONTINUED | OUTPATIENT
Start: 2022-11-21 | End: 2022-11-22 | Stop reason: HOSPADM

## 2022-11-21 RX ORDER — LISINOPRIL AND HYDROCHLOROTHIAZIDE 25; 20 MG/1; MG/1
1 TABLET ORAL DAILY
Status: DISCONTINUED | OUTPATIENT
Start: 2022-11-21 | End: 2022-11-21 | Stop reason: CLARIF

## 2022-11-21 RX ORDER — ENOXAPARIN SODIUM 100 MG/ML
30 INJECTION SUBCUTANEOUS 2 TIMES DAILY
Status: CANCELLED | OUTPATIENT
Start: 2022-11-21

## 2022-11-21 RX ORDER — PROMETHAZINE HYDROCHLORIDE 25 MG/1
12.5 TABLET ORAL EVERY 6 HOURS PRN
Status: DISCONTINUED | OUTPATIENT
Start: 2022-11-21 | End: 2022-11-22 | Stop reason: HOSPADM

## 2022-11-21 RX ADMIN — CEFTRIAXONE 2000 MG: 2 INJECTION, POWDER, FOR SOLUTION INTRAMUSCULAR; INTRAVENOUS at 15:52

## 2022-11-21 RX ADMIN — HEPARIN SODIUM 8000 UNITS: 1000 INJECTION INTRAVENOUS; SUBCUTANEOUS at 21:56

## 2022-11-21 RX ADMIN — IOPAMIDOL 75 ML: 755 INJECTION, SOLUTION INTRAVENOUS at 13:03

## 2022-11-21 RX ADMIN — HEPARIN SODIUM 8900 UNITS: 1000 INJECTION INTRAVENOUS; SUBCUTANEOUS at 14:30

## 2022-11-21 RX ADMIN — HEPARIN SODIUM 2010 UNITS/HR: 10000 INJECTION, SOLUTION INTRAVENOUS at 14:38

## 2022-11-21 ASSESSMENT — PAIN SCALES - GENERAL
PAINLEVEL_OUTOF10: 0
PAINLEVEL_OUTOF10: 0
PAINLEVEL_OUTOF10: 3

## 2022-11-21 ASSESSMENT — ENCOUNTER SYMPTOMS
SHORTNESS OF BREATH: 0
CHEST TIGHTNESS: 0
GASTROINTESTINAL NEGATIVE: 1

## 2022-11-21 ASSESSMENT — PAIN DESCRIPTION - LOCATION: LOCATION: FLANK

## 2022-11-21 ASSESSMENT — PAIN DESCRIPTION - ORIENTATION: ORIENTATION: RIGHT

## 2022-11-21 NOTE — ED PROVIDER NOTES
[address]  [address] [address]  Dept: [phone]  Loc: [phone]  eMERGENCYdEPARTMENT eNCOUnter      Pt Name: Kartik Cardenas  MRN: [id number]  [place] 1960  Date of evaluation: 11/21/2022  Provider:Margoth Bliss PA-C    CHIEF COMPLAINT       Chief Complaint   Patient presents with    Flank Pain     Pt had surgery on November 10th and was sent in for possible blood clot        CRITICAL CARE TIME   Total Critical Care time was 0 minutes, excluding separately reportable procedures. There was a high probability of clinically significant/life threatening deterioration in the patient's condition which required my urgentintervention. HISTORY OF PRESENT ILLNESS  (Location/Symptom, Timing/Onset, Context/Setting, Quality, Duration,Modifying Factors, Severity.)   Kartik Cardenas is a 58 y.o. female who presents to the emergency department by private vehicle with concerns for pulmonary embolism. Patient underwent a laminectomy with microdiscectomy on 11/10. On 11/17 she began developing pain in her right upper back/scapular region. Pain describes sharp in sensation has gradually increased in severity. Pain worse with inspiration. Patient states is difficult to take a breath given pain. She denies any shortness of breath, chest pain. This pain is different than that she experienced prior to surgery and postop. Denies having pain like this previously. Does have family history of blood clots. There is concern for PE given pleuritic component. Patient was directed to ED for further evaluation. Nursing Notes were reviewedand agreed with or any disagreements were addressed in the HPI. REVIEW OF SYSTEMS    (2-9 systems for level 4, 10 or more for level 5)     Review of Systems   Constitutional:  Negative for chills and fever. HENT: Negative. Respiratory:  Negative for chest tightness and shortness of breath. Cardiovascular:  Negative for chest pain. Gastrointestinal: Negative. Genitourinary: Negative. Musculoskeletal:  Positive for myalgias. Negative for arthralgias. Skin: Negative. Neurological: Negative. Psychiatric/Behavioral:  Negative for behavioral problems and confusion. Except as noted above the remainder of the review of systems was reviewed and negative. PAST MEDICAL HISTORY         Diagnosis Date    Allergic rhinitis     Chronic allergic rhinitis     Dyspepsia     Hypertension     Hypothyroidism        SURGICAL HISTORY           Procedure Laterality Date    COLONOSCOPY      COLONOSCOPY  2017    polyp dr Abbi Larson repeat in 5 years    KNEE SURGERY Left     KNEE SURGERY Right 2022    arthroscopic    THROAT SURGERY      VOCAL CORD SURGERY    THYROID SURGERY  5319/7171    TONSILLECTOMY      VASCULAR SURGERY Bilateral        CURRENT MEDICATIONS     [unfilled]    ALLERGIES     Sulfa antibiotics    FAMILY HISTORY           Problem Relation Age of Onset    Cancer Paternal Uncle         colon/liver    Cancer Maternal Grandmother         cervical    Cancer Maternal Grandfather         colon     Family Status   Relation Name Status    PUnc  (Not Specified)    MGM      MGF      Mother  [de-identified], age 66y        healthy    Father   at age 79        pulmonary embolism    PGM      PGF          SOCIAL HISTORY      reports that she has never smoked. She has never used smokeless tobacco. She reports current alcohol use. She reports that she does not use drugs.     PHYSICAL EXAM    (up to 7 for level 4, 8 or more for level 5)     ED Triage Vitals [22 1103]   Enc Vitals Group      BP (!) 165/88      Heart Rate 86      Resp 18      Temp 97.4 °F (36.3 °C)      Temp Source Oral      SpO2 98 %      Weight 246 lb 7.6 oz (111.8 kg)      Height 5' 9\" (1.753 m)      Head Circumference       Peak Flow       Pain Score       Pain Loc       Pain Edu?       Excl. in [address]? Physical Exam  Vitals reviewed. Constitutional:       Appearance: Normal appearance. HENT:      Head: Normocephalic and atraumatic. Cardiovascular:      Rate and Rhythm: Normal rate and regular rhythm. Pulmonary:      Effort: Pulmonary effort is normal. No respiratory distress. Comments: Diminished on right, faint coarse breath sounds in lower lobe  Musculoskeletal:         General: Normal range of motion. Cervical back: Normal range of motion and neck supple. Skin:     General: Skin is warm. Neurological:      General: No focal deficit present. Mental Status: She is alert and oriented to person, place, and time. Psychiatric:         Mood and Affect: Mood normal.         Behavior: Behavior normal.         DIAGNOSTIC RESULTS     EKG: All EKG's are interpreted by the Emergency Department Physician who either signs or Co-signs this chart in the absence of a cardiologist.    RADIOLOGY:   Non-plain film images such as CT, Ultrasound and MRI are read by the radiologist. Plain radiographic images are visualized and preliminarilyinterpreted by the emergency physician with the below findings:    Interpretation per the Radiologist below,if available at the time of this note:    CT CHEST PULMONARY EMBOLISM W CONTRAST   Final Result   Pulmonary emboli throughout the right lung as described with an infarct in   the right upper lobe and a trace right pleural effusion. No right heart   strain is noted. Borderline cardiomegaly. Critical results were called by Dr. Cliff Carrillo to [facility] on   11/21/2022 at 1:26 p.m. Tanda Bun       RECOMMENDATIONS:   Unavailable               LABS:  Labs Reviewed   CBC WITH AUTO DIFFERENTIAL - Abnormal; Notable for the following components:       Result Value    WBC 15.3 (*)     Neutrophils Absolute 12.0 (*)     All other components within normal limits   BASIC METABOLIC PANEL W/ REFLEX TO MG FOR LOW K - Abnormal; Notable for the following components:    Sodium 134 (*)     Chloride 97 (*)     Creatinine <0.5 (*)     All other components within normal limits   BRAIN NATRIURETIC PEPTIDE - Abnormal; Notable for the following components:    Pro- (*)     All other components within normal limits   URINALYSIS WITH REFLEX TO CULTURE - Abnormal; Notable for the following components:    Color, UA DARK YELLOW (*)     Clarity, UA CLOUDY (*)     Ketones, Urine TRACE (*)     Protein, UA 30 (*)     Leukocyte Esterase, Urine LARGE (*)     All other components within normal limits   MICROSCOPIC URINALYSIS - Abnormal; Notable for the following components:    WBC, UA 10-20 (*)     Epithelial Cells, UA 6-10 (*)     Bacteria, UA 2+ (*)     All other components within normal limits   CBC - Abnormal; Notable for the following components:    WBC 14.4 (*)     All other components within normal limits   CULTURE, URINE   TROPONIN   SPECIMEN REJECTION   APTT       All other labs were within normal range or not returned as of this dictation. EMERGENCY DEPARTMENT COURSE and DIFFERENTIAL DIAGNOSIS/MDM:   Vitals:    Vitals:    11/21/22 1400 11/21/22 1415 11/21/22 1445 11/21/22 1530   BP: (!) 146/79 (!) 146/77 135/76 138/78   Pulse: 84 79 85 75   Resp: 28 26 23 (!) 31   Temp:       TempSrc:       SpO2: 98% 96% 96% 96%   Weight:       Height:           MDM    Patient presents ED with HPI noted above. Patient with pulmonary emboli throughout right lung with infarct in right upper lobe and trace right pleural effusion, no right heart strain noted. Borderline cardiomegaly. She is hemodynamically stable, she is not hypoxic with oxygen saturation 98% on room air. Surgery was on 11/10. Serous drainage noted to incision site, no significant bleeding. She was started on heparin in the ED. Consultation made to cardiology, Dr. Mustapha Sultana kindly spoke with me and will follow. Patient also found to have UTI, she was given IV Rocephin in the ED.     Consultation made to hospitalist regarding mission. Dr. Darian Herrera kindly admitted. Is this patient to be included in the SEP-1 Core Measure due to severe sepsis or septic shock? No   Exclusion criteria - the patient is NOT to be included for SEP-1 Core Measure due to:  2+ SIRS criteria are not met     CONSULTS:  IP CONSULT TO CARDIOLOGY    PROCEDURES:  Procedures    FINAL IMPRESSION      1. Other acute pulmonary embolism without acute cor pulmonale ([facility] [address]    2. Acute cystitis without hematuria          DISPOSITION/PLAN   @[facility]@    PATIENT REFERRED TO:  No follow-up provider specified.     DISCHARGE MEDICATIONS:  New Prescriptions    No medications on file       (Please note that portions of this note were completed with a voice recognition program.  Efforts were made to edit the dictations but occasionally words are mis-transcribed.)    [address] Northern Light Mayo HospitalJEFFERY           [address] [place], [place]  11/21/22 8620

## 2022-11-21 NOTE — ED NOTES
PTT was sent on patient prior to starting heparin. Heparin was started, lab called rejecting PTT. This RN called Jose Carvajal, made him aware. States to just draw next PTT and not one due right now since heparin was started.       Nestor Flood RN  11/21/22 1853

## 2022-11-21 NOTE — CONSULTS
Clinical Pharmacy Note  Heparin Dosing Consult    Phong Dickerson is a 58 y.o. female ordered heparin per high dose nomogram by [facility]. Lab Results   Component Value Date/Time    APTT 26.2 11/07/2022 12:49 PM     Lab Results   Component Value Date/Time    HGB 14.9 11/21/2022 02:07 PM    HCT 44.3 11/21/2022 02:07 PM     11/21/2022 02:07 PM    INR 0.94 11/07/2022 12:49 PM       Ht Readings from Last 1 Encounters:   11/21/22 5' 9\" (1.753 m)        Wt Readings from Last 1 Encounters:   11/21/22 246 lb 7.6 oz (111.8 kg)        Assessment/Plan:  Initial bolus: 8,900 units  Initial infusion rate: 2,010 units/hr  Next aPTT: 2030 11/21/22    Pharmacy will continue to monitor adjust heparin based on aPTT results using nomogram below:     VTE/DVT/PE Heparin Nomogram     Initial Bolus: 80 units/kg Max Bolus: 10,000 units       Initial Rate: 18 units/kg/hr Max Initial Rate: 2,100 units/hr     aPTT < 59    Heparin 80 units/kg bolus Increase infusion by 4 units/kg/hr        (maximum 10,000 units)   aPTT 59-72.9    Heparin 40 units/kg bolus Increase infusion by 2 units/kg/hr        (maximum 5,000 units)   aPTT      No bolus   No change   aPTT 102.1-109  No bolus   Decrease infusion by 1 units/kg/hr   aPTT 109.1-122.9   No bolus   Decrease infusion by 2 units/kg/hr   aPTT > 123     Hold heparin for 1 hour Decrease infusion by 3 units/kg/hr    Obtain aPTT 6 hours after initial bolus and 6 hours after any dose change until two consecutive therapeutic aPTTs are achieved - then daily.

## 2022-11-21 NOTE — PROGRESS NOTES
Medication Reconciliation    List of medications patient is currently taking is complete. Source of information: 1. Conversation with patient at bedside                                      2. EPIC records      Allergies  Sulfa antibiotics     Notes regarding home medications:   1. Patient did not receive all of her home medications except Levothyroxine 150 MCG prior to arrival to the emergency department.     Alecia Payton, Pharmacy Student  11/21/2022 1:59 PM

## 2022-11-21 NOTE — CONSULTS
[address] [place]  1960 November 21, 2022    Reason for Consult: Pulmonary Embolus    CC: Shortness of breath    HPI:  The patient is 58 y.o. female with a past medical history significant for essential hypertension who presented to [facility] ED with shortness of breath. She had a lumbar discectomy on 11/10/22 done by Dr. Saumya Hatch. Lucretia Carlin states that last week she had a little pain in her right shoulder blade. She thought this was positional. She developed right flank pain and pain into her lower ribcage. There was pain if she took a deep breath. She denies any cough at present but had a dry cough. She is a little short of breath with walking to the CT scanner. She was placed on Keflex several days ago for drainage from the incision. This antibiotic was stopped today. She stakes progesterone hormone replacement therapy. She also takes a \"pellet\". Review of Systems:  Constitutional: No fatigue, weakness, night sweats or fever. HEENT: No new vision difficulties or ringing in the ears. Respiratory: No new SOB, PND, orthopnea or cough. Cardiovascular: See HPI   GI: No n/v, diarrhea, constipation, abdominal pain or changes in bowel habits. No melena, no hematochezia  : No urinary frequency, urgency, incontinence, hematuria or dysuria. Skin: No cyanosis or skin lesions. Musculoskeletal: Positive for chronic and new back pain. No new muscle or joint pain. Neurological: No syncope or TIA-like symptoms.   Psychiatric: No anxiety, insomnia or depression     Past Medical History:   Diagnosis Date    Allergic rhinitis     Chronic allergic rhinitis     Dyspepsia     Hypertension     Hypothyroidism      Past Surgical History:   Procedure Laterality Date    COLONOSCOPY  2009    COLONOSCOPY  08/18/2017    polyp dr Scott Khan repeat in 5 years    KNEE SURGERY Left 2020    KNEE SURGERY Right 07/2022    arthroscopic    [address] THYROID SURGERY  [id number]    TONSILLECTOMY      VASCULAR SURGERY Bilateral      Family History   Problem Relation Age of Onset    Cancer Paternal Uncle         colon/liver    Cancer Maternal Grandmother         cervical    Cancer Maternal Grandfather         colon   Father  of a pulmonary embolus which was unprovoked. Social History     Tobacco Use    Smoking status: Never    Smokeless tobacco: Never   Vaping Use    Vaping Use: Never used   Substance Use Topics    Alcohol use: Yes     Comment: social. 2 times a week     Drug use: No       Allergies   Allergen Reactions    Sulfa Antibiotics Rash     Current Facility-Administered Medications   Medication Dose Route Frequency Provider Last Rate Last Admin    heparin 25,000 unit in sodium chloride 0.45% 250 mL (premix) infusion  0-4,000 Units/hr IntraVENous Continuous Margoth Barry, PA-C 20.1 mL/hr at 22 1530 2,010 Units/hr at 22 1530    cefTRIAXone (ROCEPHIN) 2,000 mg in dextrose 5 % 50 mL IVPB mini-bag  2,000 mg IntraVENous Once Margoth Barry, PA-C 100 mL/hr at 22 1552 2,000 mg at 22 1552     Current Outpatient Medications   Medication Sig Dispense Refill    cephALEXin (KEFLEX) 250 MG capsule Take 250 mg by mouth 4 times daily      levothyroxine (SYNTHROID) 150 MCG tablet TAKE ONE TABLET BY MOUTH DAILY 30 tablet 5    acetaminophen (TYLENOL) 500 MG tablet Take 1 tablet by mouth every 6 hours as needed for Pain 30 tablet 0    furosemide (LASIX) 20 MG tablet TAKE ONE TABLET BY MOUTH DAILY (Patient taking differently: Take 20 mg by mouth as needed) 90 tablet 1    lisinopril-hydroCHLOROthiazide (PRINZIDE;ZESTORETIC) 20-25 MG per tablet Take 1 tablet by mouth in the morning.  90 tablet 1    Fexofenadine HCl (ALLEGRA PO) Take 500 mg by mouth daily      progesterone (PROMETRIUM) 200 MG CAPS capsule Take 1 tablet by mouth daily      lansoprazole (PREVACID) 15 MG delayed release capsule Take 15 mg by mouth daily      Cyanocobalamin (VITAMIN B 12) 500 MCG TABS Take 1 tablet by mouth daily      Vitamin D (CHOLECALCIFEROL) 1000 UNITS CAPS capsule Take 1,000 Units by mouth daily         Physical Exam:   /78   Pulse 75   Temp 97.4 °F (36.3 °C) (Oral)   Resp (!) 31   Ht 5' 9\" (1.753 m)   Wt 246 lb 7.6 oz (111.8 kg)   SpO2 96%   BMI 36.40 kg/m²   No intake or output data in the 24 hours ending 11/21/22 1612  Wt Readings from Last 2 Encounters:   11/21/22 246 lb 7.6 oz (111.8 kg)   11/08/22 250 lb (113.4 kg)     Constitutional: She is oriented to person, place, and time. She appears well-developed and well-nourished. In no acute distress. Head: Normocephalic and atraumatic. Neck: Neck supple. No JVD present. Carotid bruit is not present. No mass and no thyromegaly present. No lymphadenopathy present. Cardiovascular: Normal rate, regular rhythm, normal heart sounds and intact distal pulses. Exam reveals no gallop and no friction rub. No murmur heard. Pulmonary/Chest: Effort normal and breath sounds normal. No respiratory distress. She has no wheezes, rhonchi or rales. Abdominal: Soft, non-tender. Bowel sounds and aorta are normal. She exhibits no organomegaly, mass or bruit. Extremities: No edema, cyanosis, or clubbing. Pulses are 2+ radial/carotid/dorsalis pedis and posterior tibial bilaterally. Neurological: She is alert and oriented to person, place, and time. She has normal reflexes. No cranial nerve deficit. Coordination normal.   Skin: Skin is warm and dry. There is no rash or diaphoresis. Psychiatric: She has a normal mood and affect.  Her speech is normal and behavior is normal.     Personally reviewed and interpreted   EKG Interpretation: Normal sinus rhythm with nonspecific T wave changes    Lab Review:   Lab Results   Component Value Date/Time    TRIG 147 11/02/2021 09:12 AM    HDL 36 11/02/2021 09:12 AM    LDLCALC 128 11/02/2021 09:12 AM    LABVLDL 29 11/02/2021 09:12 AM     Lab Results   Component Value Date/Time     11/21/2022 11:30 AM    K 4.2 11/21/2022 11:30 AM    BUN 8 11/21/2022 11:30 AM    CREATININE <0.5 11/21/2022 11:30 AM     Recent Labs     11/21/22  1130 11/21/22  1407   WBC 15.3* 14.4*   HGB 15.5 14.9   HCT 47.3 44.3    313     Troponin negative    Chest CT 11/21/22:  Pulmonary emboli throughout the right lung as described with an infarct in   the right upper lobe and a trace right pleural effusion. No right heart   strain is noted. Borderline cardiomegaly. Assessment / Plan: 1. Pulmonary Embolus, acute, segmental with pulmonary infarct  Jaylen Awan and I spoke at length about her presentation and pulmonary embolus. Her  was present at the time. Her RV/LV ration is not significantly elevated and her troponin assays are normal. She is not hypoxic or hypotensive but she does seem to have had a pulmonary infarct. I would continue her on a heparin drip overnight. If she is not better from a pain and respiratory perspective in the morning I would consider thrombectomy for her. She is amenable to this after discussing the risks and benefits. She may ultimately need a hypercoaguable work-up given her father's death from an unprovoked PE. 2.Essential Hypertension  Controlled on her current medications.

## 2022-11-21 NOTE — ED NOTES
Judith FLEMING notified about wound on back leaking. Paused Heparin until she checks it.      Angel Avery, RN  11/21/22 [address], RN  11/21/22 [id number]

## 2022-11-22 VITALS
SYSTOLIC BLOOD PRESSURE: 132 MMHG | HEIGHT: 69 IN | RESPIRATION RATE: 26 BRPM | OXYGEN SATURATION: 98 % | DIASTOLIC BLOOD PRESSURE: 70 MMHG | BODY MASS INDEX: 36.41 KG/M2 | HEART RATE: 80 BPM | TEMPERATURE: 98.3 F | WEIGHT: 245.81 LBS

## 2022-11-22 LAB
ANION GAP SERPL CALCULATED.3IONS-SCNC: 13 MMOL/L (ref 3–16)
APTT: 138.4 SEC (ref 23–34.3)
APTT: 87 SEC (ref 23–34.3)
BASOPHILS ABSOLUTE: 0.1 K/UL (ref 0–0.2)
BASOPHILS RELATIVE PERCENT: 0.4 %
BUN BLDV-MCNC: 7 MG/DL (ref 7–20)
CALCIUM SERPL-MCNC: 9 MG/DL (ref 8.3–10.6)
CHLORIDE BLD-SCNC: 101 MMOL/L (ref 99–110)
CO2: 24 MMOL/L (ref 21–32)
CREAT SERPL-MCNC: 0.6 MG/DL (ref 0.6–1.2)
EOSINOPHILS ABSOLUTE: 0.2 K/UL (ref 0–0.6)
EOSINOPHILS RELATIVE PERCENT: 1.4 %
GFR SERPL CREATININE-BSD FRML MDRD: >60 ML/MIN/{1.73_M2}
GLUCOSE BLD-MCNC: 112 MG/DL (ref 70–99)
HCT VFR BLD CALC: 39.9 % (ref 36–48)
HEMOGLOBIN: 13.9 G/DL (ref 12–16)
LYMPHOCYTES ABSOLUTE: 2.9 K/UL (ref 1–5.1)
LYMPHOCYTES RELATIVE PERCENT: 22.4 %
MAGNESIUM: 1.8 MG/DL (ref 1.8–2.4)
MCH RBC QN AUTO: 32.9 PG (ref 26–34)
MCHC RBC AUTO-ENTMCNC: 35 G/DL (ref 31–36)
MCV RBC AUTO: 94.2 FL (ref 80–100)
MONOCYTES ABSOLUTE: 0.9 K/UL (ref 0–1.3)
MONOCYTES RELATIVE PERCENT: 7 %
NEUTROPHILS ABSOLUTE: 9 K/UL (ref 1.7–7.7)
NEUTROPHILS RELATIVE PERCENT: 68.8 %
PDW BLD-RTO: 13 % (ref 12.4–15.4)
PLATELET # BLD: 298 K/UL (ref 135–450)
PMV BLD AUTO: 7.8 FL (ref 5–10.5)
POTASSIUM REFLEX MAGNESIUM: 3.3 MMOL/L (ref 3.5–5.1)
RBC # BLD: 4.23 M/UL (ref 4–5.2)
SODIUM BLD-SCNC: 138 MMOL/L (ref 136–145)
URINE CULTURE, ROUTINE: NORMAL
WBC # BLD: 13.1 K/UL (ref 4–11)

## 2022-11-22 PROCEDURE — 80048 BASIC METABOLIC PNL TOTAL CA: CPT

## 2022-11-22 PROCEDURE — 36415 COLL VENOUS BLD VENIPUNCTURE: CPT

## 2022-11-22 PROCEDURE — 6370000000 HC RX 637 (ALT 250 FOR IP): Performed by: INTERNAL MEDICINE

## 2022-11-22 PROCEDURE — 2500000003 HC RX 250 WO HCPCS: Performed by: PHYSICIAN ASSISTANT

## 2022-11-22 PROCEDURE — 97165 OT EVAL LOW COMPLEX 30 MIN: CPT

## 2022-11-22 PROCEDURE — 85025 COMPLETE CBC W/AUTO DIFF WBC: CPT

## 2022-11-22 PROCEDURE — 85730 THROMBOPLASTIN TIME PARTIAL: CPT

## 2022-11-22 PROCEDURE — 97530 THERAPEUTIC ACTIVITIES: CPT

## 2022-11-22 PROCEDURE — 2580000003 HC RX 258: Performed by: INTERNAL MEDICINE

## 2022-11-22 PROCEDURE — 94660 CPAP INITIATION&MGMT: CPT

## 2022-11-22 PROCEDURE — 83735 ASSAY OF MAGNESIUM: CPT

## 2022-11-22 PROCEDURE — 94760 N-INVAS EAR/PLS OXIMETRY 1: CPT

## 2022-11-22 PROCEDURE — 97161 PT EVAL LOW COMPLEX 20 MIN: CPT | Performed by: PHYSICAL THERAPIST

## 2022-11-22 PROCEDURE — 6360000002 HC RX W HCPCS: Performed by: INTERNAL MEDICINE

## 2022-11-22 PROCEDURE — 97116 GAIT TRAINING THERAPY: CPT | Performed by: PHYSICAL THERAPIST

## 2022-11-22 RX ORDER — CEPHALEXIN 500 MG/1
500 CAPSULE ORAL 4 TIMES DAILY
Qty: 20 CAPSULE | Refills: 0 | Status: SHIPPED | OUTPATIENT
Start: 2022-11-22 | End: 2022-11-27

## 2022-11-22 RX ADMIN — ACETAMINOPHEN 650 MG: 325 TABLET ORAL at 06:10

## 2022-11-22 RX ADMIN — HYDROCHLOROTHIAZIDE 25 MG: 25 TABLET ORAL at 08:13

## 2022-11-22 RX ADMIN — CYANOCOBALAMIN TAB 500 MCG 500 MCG: 500 TAB at 08:13

## 2022-11-22 RX ADMIN — POTASSIUM CHLORIDE 40 MEQ: 1500 TABLET, EXTENDED RELEASE ORAL at 08:13

## 2022-11-22 RX ADMIN — LISINOPRIL 20 MG: 20 TABLET ORAL at 08:13

## 2022-11-22 RX ADMIN — Medication 10 ML: at 08:15

## 2022-11-22 RX ADMIN — HEPARIN SODIUM 2450 UNITS/HR: 10000 INJECTION, SOLUTION INTRAVENOUS at 02:21

## 2022-11-22 RX ADMIN — CEFTRIAXONE 2000 MG: 2 INJECTION, POWDER, FOR SOLUTION INTRAMUSCULAR; INTRAVENOUS at 08:47

## 2022-11-22 RX ADMIN — LEVOTHYROXINE SODIUM 150 MCG: 0.07 TABLET ORAL at 06:10

## 2022-11-22 RX ADMIN — PANTOPRAZOLE SODIUM 40 MG: 40 TABLET, DELAYED RELEASE ORAL at 06:10

## 2022-11-22 ASSESSMENT — PAIN SCALES - GENERAL: PAINLEVEL_OUTOF10: 4

## 2022-11-22 ASSESSMENT — PAIN DESCRIPTION - DESCRIPTORS: DESCRIPTORS: ACHING

## 2022-11-22 ASSESSMENT — PAIN DESCRIPTION - LOCATION: LOCATION: BACK;GENERALIZED

## 2022-11-22 NOTE — PROGRESS NOTES
Pt is being discharge home per discharge order, IV and tele box has been removed, pt home medication have been given to pt and reviewed, pt has been instructed to follow up with Dr. Jennifer Laws as out patient, pt family is at bedside.

## 2022-11-22 NOTE — H&P
Hospital Medicine History & Physical      PCP: Brittny Jackson MD    Date of Admission: 11/21/2022    Chief Complaint:  R sided chest pain    History Of Present Illness:    Patient is a 60-year-old female with past medical history of hypertension, obesity who presents to the hospital for right-sided chest pain. According to the patient she was seen by her orthopedics doctor office today where she complained about pain on the right sided rib cage and difficulty taking deep breaths, she was referred to the emergency department for further evaluation. On CT chest patient was found to have PE pulmonary infarct. Patient otherwise denied fevers chills diarrhea constipation dysuria. Past Medical History:          Diagnosis Date    Allergic rhinitis     Chronic allergic rhinitis     Dyspepsia     Hypertension     Hypothyroidism        Past Surgical History:          Procedure Laterality Date    COLONOSCOPY  2009    COLONOSCOPY  08/18/2017    polyp dr Bennie Llamas repeat in 5 years    KNEE SURGERY Left 2020    KNEE SURGERY Right 07/2022    arthroscopic    [address]    THYROID SURGERY  [id number]    TONSILLECTOMY      VASCULAR SURGERY Bilateral        Medications Prior to Admission:      Prior to Admission medications    Medication Sig Start Date End Date Taking? Authorizing Provider   cephALEXin (KEFLEX) 250 MG capsule Take 250 mg by mouth 4 times daily 11/18/22 11/24/22  Historical Provider, MD   levothyroxine (SYNTHROID) 150 MCG tablet TAKE ONE TABLET BY MOUTH DAILY 10/31/22   Brittny Jackson MD   acetaminophen (TYLENOL) 500 MG tablet Take 1 tablet by mouth every 6 hours as needed for Pain 10/1/22   Mihai Joel MD   furosemide (LASIX) 20 MG tablet TAKE ONE TABLET BY MOUTH DAILY  Patient taking differently: Take 20 mg by mouth as needed 9/9/22   Brittny Jackson MD   lisinopril-hydroCHLOROthiazide (PRINZIDE;ZESTORETIC) 20-25 MG per tablet Take 1 tablet by mouth in the morning. 8/11/22   Massiel Bell MD   Fexofenadine HCl (ALLEGRA PO) Take 500 mg by mouth daily    Historical Provider, MD   progesterone (PROMETRIUM) 200 MG CAPS capsule Take 1 tablet by mouth daily    Historical Provider, MD   lansoprazole (PREVACID) 15 MG delayed release capsule Take 15 mg by mouth daily    Historical Provider, MD   Cyanocobalamin (VITAMIN B 12) 500 MCG TABS Take 1 tablet by mouth daily    Historical Provider, MD   Vitamin D (CHOLECALCIFEROL) 1000 UNITS CAPS capsule Take 1,000 Units by mouth daily    Historical Provider, MD       Allergies:  Sulfa antibiotics    Social History:      TOBACCO:   reports that she has never smoked. She has never used smokeless tobacco.  ETOH:   reports current alcohol use. Family History:       Reviewed in detail and non contributory          Problem Relation Age of Onset    Cancer Paternal Uncle         colon/liver    Cancer Maternal Grandmother         cervical    Cancer Maternal Grandfather         colon       REVIEW OF SYSTEMS:   Pertinent positives as noted in the HPI. All other systems reviewed and negative. PHYSICAL EXAM PERFORMED:    /65   Pulse 77   Temp 98.7 °F (37.1 °C) (Oral)   Resp 25   Ht 5' 9\" (1.753 m)   Wt 246 lb 7.6 oz (111.8 kg)   SpO2 97%   BMI 36.40 kg/m²     General appearance:  No apparent distress, cooperative. HEENT:  Normal cephalic, atraumatic without obvious deformity. Conjunctivae/corneas clear. Neck: Supple, with full range of motion. No cervical lymphadenopathy  Respiratory:  Normal respiratory effort. Clear to auscultation, bilaterally without Rales/Wheezes/Rhonchi. Cardiovascular:  Regular rate and rhythm with normal S1/S2 without murmurs, rubs or gallops. Abdomen: Soft, non-tender, non-distended, normal bowel sounds. Musculoskeletal:  No edema noted bilaterally. No tenderness on palpation   Skin: no rash visible  Neurologic:  Neurologically intact without any focal sensory/motor deficits.   grossly non-focal.  Psychiatric:  Alert and oriented, normal mood  Peripheral Pulses: +2 palpable, equal bilaterally       Labs:     Recent Labs     11/21/22  1130 11/21/22  1407   WBC 15.3* 14.4*   HGB 15.5 14.9   HCT 47.3 44.3    313     Recent Labs     11/21/22  1130   *   K 4.2   CL 97*   CO2 23   BUN 8   CREATININE <0.5*   CALCIUM 9.4     No results for input(s): AST, ALT, BILIDIR, BILITOT, ALKPHOS in the last 72 hours. No results for input(s): INR in the last 72 hours. Recent Labs     11/21/22  1130   TROPONINI <0.01       Urinalysis:      Lab Results   Component Value Date/Time    NITRU Negative 11/21/2022 12:35 PM    WBCUA 10-20 11/21/2022 12:35 PM    BACTERIA 2+ 11/21/2022 12:35 PM    RBCUA None seen 11/21/2022 12:35 PM    BLOODU Negative 11/21/2022 12:35 PM    SPECGRAV 1.019 11/21/2022 12:35 PM    GLUCOSEU Negative 11/21/2022 12:35 PM       Radiology:       CT CHEST PULMONARY EMBOLISM W CONTRAST   Final Result   Pulmonary emboli throughout the right lung as described with an infarct in   the right upper lobe and a trace right pleural effusion. No right heart   strain is noted. Borderline cardiomegaly. Critical results were called by Dr. Bailee Carlson to [facility] on   11/21/2022 at 1:26 p.m. Suleiman Diaz RECOMMENDATIONS:   Unavailable                 Active Hospital Problems    Diagnosis Date Noted    Pulmonary embolus [facility]) [I26.99] 11/21/2022     Priority: Medium    Essential hypertension [I10]        Patient is a 42-year-old female with past medical history of hypertension, obesity who presents to the hospital for right-sided chest pain. According to the patient she was seen by her orthopedics doctor office today where she complained about pain on the right sided rib cage and difficulty taking deep breaths, she was referred to the emergency department for further evaluation. On CT chest patient was found to have PE pulmonary infarct.   Patient otherwise denied fevers chills diarrhea constipation dysuria. Assessment  Pulmonary embolism  Acute cystitis  Hypertension  Hypothyroidism    Plan  Start IV heparin  cardiology consulted from ED  Check echocardiogram  Start IV Rocephin  Follow-up on urine culture  DVT prophylaxis-on heparin  Resume home medications  Diet: No diet orders on file  Code Status: Prior    PT/OT Eval Status: ordered    Dispo - pending clinical improvement       Jorge Bravo MD    The note was completed using EMR and Dragon dictation system. Every effort was made to ensure accuracy; however, inadvertent computerized transcription errors may be present. Thank you Melba Elizabeth MD for the opportunity to be involved in this patient's care. If you have any questions or concerns please feel free to contact me at [phone].     Jorge Bravo MD

## 2022-11-22 NOTE — PROGRESS NOTES
Patient arrived to room 5111 from ed. Alert and oriented x 4, no c/o pain at this time. Heparin gtt infusing. Incision noted to lower lumbar back from previous surgery. Patient safe will call light in reach and calls appropriately.

## 2022-11-22 NOTE — PROGRESS NOTES
Occupational Therapy  Facility/Department: [facility]  Occupational Therapy Initial Assessment    Name: Hiro Jerry  : 1960  MRN: [id number]  Date of Service: 2022    Discharge Recommendations:  Home with assist PRN  OT Equipment Recommendations  Equipment Needed: No     Hiro Jerry scored a 20/24 on the AM-PAC ADL Inpatient form. At this time, no further OT is recommended upon discharge. Recommend patient returns to prior setting with prior services. Patient Diagnosis(es): The primary encounter diagnosis was Other acute pulmonary embolism without acute cor pulmonale ([facility] [address] A diagnosis of Acute cystitis without hematuria was also pertinent to this visit. Past Medical History:  has a past medical history of Allergic rhinitis, Chronic allergic rhinitis, Dyspepsia, Hypertension, and Hypothyroidism. Past Surgical History:  has a past surgical history that includes Thyroid surgery (7869/4775); Throat surgery (); Colonoscopy (); vascular surgery (Bilateral); Tonsillectomy; knee surgery (Left, ); Colonoscopy (2017); and knee surgery (Right, 2022). Assessment   Assessment: 59 y/o female admitted 2022 with PE folloing lumbar surgery 11/10. PTA pt lives at home with spouse, was independent with ADLs and functional mobility. Today, pt completed ADL transfers/mobility independently. Pt able to demo ability to cross LE over knee to simulate LB Dressing while maintaining spinal precautions. Pt safe to return home and no further OT warranted. Prognosis: Good  Decision Making: Low Complexity  No Skilled OT: Independent with functional mobility; Independent with ADL's;At baseline function; Safe to return home; No OT goals identified  REQUIRES OT FOLLOW-UP: No  Activity Tolerance  Activity Tolerance: Patient Tolerated treatment well        Plan   Occupational Therapy Plan  Times Per Week: DC acute OT     Restrictions  Position Activity Restriction  Spinal Precautions: No Lifting, No Twisting, No Bending  Other position/activity restrictions: spinal precautions from recent surgery at [facility]    Subjective   General  Chart Reviewed: Yes  Patient assessed for rehabilitation services?: Yes  Additional Pertinent Hx: 58 y.o. female who presents to the emergency department by private vehicle with concerns for pulmonary embolism. Patient underwent a laminectomy with microdiscectomy on 11/10. On 11/17 she began developing pain in her right upper back/scapular region. Pain describes sharp in sensation has gradually increased in severity. CT chest patient was found to have PE pulmonary infarct  Family / Caregiver Present: No  Referring Practitioner: Dr. Saqib Vazquez  Diagnosis: PE  Subjective  Subjective: Pt seen bedside and agreeable to therapy. General Comment  Comments: Per RN ok for therapy     Social/Functional History  Social/Functional History  Lives With: Spouse  Type of Home: House  Home Layout: Two level, Bed/Bath upstairs, 1/2 bath on main level  Home Access: Stairs to enter without rails  Entrance Stairs - Number of Steps: 1 DANIELLA  Bathroom Shower/Tub: Tub/Shower unit  Bathroom Toilet: Standard  Home Equipment:  (no DME)  Has the patient had two or more falls in the past year or any fall with injury in the past year?: No  ADL Assistance: Independent  Homemaking Assistance: Independent  Ambulation Assistance: Independent (no AD)  Transfer Assistance: Independent  Active :  (has not driven since Nov 10 (back surgery))       Objective   Safety Devices  Type of Devices: Call light within reach;Nurse notified; Left in chair        AROM: Within functional limits  Strength: Within functional limits  Coordination: Within functional limits  Tone: Normal  Sensation: Intact    ADL  Additional Comments: Pt declined ADLs. Pt able to cross LE over knee to simulate LB Dressing while maintaining spinal precautions.  Anticipate pt will be supervision for all ADLs     Activity Tolerance  Activity Tolerance: Patient tolerated treatment well    Bed mobility  Supine to Sit: Modified independent  Sit to Supine:  (pt in chair at end of session)    Transfers  Sit to stand: Independent  Stand to sit: Independent  Transfer Comments: pt ambulated around room and in pacheco independently    Vision  Vision: Impaired  Vision Exceptions: Wears glasses for reading  Hearing  Hearing: Within functional limits    Cognition  Overall Cognitive Status: WFL  Orientation  Overall Orientation Status: Within Functional Limits  Perception  Overall Perceptual Status: [facility]               Education Given To: Patient; Family  Education Provided: Role of Therapy;Plan of Care;Transfer Training  Education Method: Demonstration;Verbal  Barriers to Learning: None  Education Outcome: Verbalized understanding;Demonstrated understanding    LUE AROM (degrees)  LUE AROM : WFL  Left Hand AROM (degrees)  Left Hand AROM: WFL  RUE AROM (degrees)  RUE AROM : WFL  Right Hand AROM (degrees)  Right Hand AROM: [facility]       AM-PAC Score  AM-PAC Inpatient Daily Activity Raw Score: 20 (11/22/22 1154)  AM-PAC Inpatient ADL T-Scale Score : 42.03 (11/22/22 1154)  ADL Inpatient CMS 0-100% Score: 38.32 (11/22/22 1154)  ADL Inpatient CMS G-Code Modifier : Nell [place] (11/22/22 1154)    Goals  Short Term Goals  Time Frame for Short Term Goals: No acute OT goals identified  Patient Goals   Patient goals : to return home       Therapy Time   Individual Concurrent Group Co-treatment   Time In 1130         Time Out 1154         Minutes 24         Timed Code Treatment Minutes: 15 Minutes     This note to serve as OT d/c summary if pt is d/c-ed prior to next therapy session.     Francisco Morales, OTR/L

## 2022-11-22 NOTE — PROGRESS NOTES
4 Eyes Skin Assessment     NAME:  Nathalia Cosme  YOB: 1960  MEDICAL RECORD NUMBER:  [id number]    The patient is being assessed for  Admission    I agree that One RN have performed a thorough Head to Toe Skin Assessment on the patient. ALL assessment sites listed below have been assessed. Areas assessed by both nurses:    Head, Face, Ears, Shoulders, Back, Chest, Arms, Elbows, Hands, Sacrum. Buttock, Coccyx, Ischium, and Legs. Feet and Heels        Does the Patient have a Wound?  No noted wound(s)       Vaughn Prevention initiated by RN: No   Wound Care Orders initiated by RN: No    Pressure Injury (Stage 3,4, Unstageable, DTI, NWPT, and Complex wounds) if present place referral order by RN under : No    New and Established Ostomies, if present place, referral order under : No      Nurse 1 eSignature: Electronically signed by Bry Melchor RN on 11/21/22 at 9:35 PM EST    **SHARE this note so that the co-signing nurse is able to place an eSignature**    Nurse 2 eSignature: Electronically signed by Jill Quiroga RN on 11/21/22 at 10:44 PM EST

## 2022-11-22 NOTE — PROGRESS NOTES
Heparin gtt stopped at this time for APTT of 138. 4 per pharmacist. Will restart in 1 hour at new rate per order.

## 2022-11-22 NOTE — PROGRESS NOTES
Clinical Pharmacy Note  Heparin Dosing       Lab Results   Component Value Date/Time    APTT 55.6 11/21/2022 09:13 PM     Lab Results   Component Value Date/Time    HGB 14.9 11/21/2022 02:07 PM    HCT 44.3 11/21/2022 02:07 PM     11/21/2022 02:07 PM    INR 0.94 11/07/2022 12:49 PM       Current Infusion Rate: 2010 units/hr    Plan:  Bolus: 8000 units  Rate: 2450 units/hr  Next aPTT: 0500    Pharmacy will continue to monitor and adjust based on aPTT results.

## 2022-11-22 NOTE — CARE COORDINATION
Case Management Assessment  Initial Evaluation    Date/Time of Evaluation: 11/22/2022 11:52 AM  Assessment Completed by: Janina Yuan RN    If patient is discharged prior to next notation, then this note serves as note for discharge by case management. Patient Name: Amanda Prater                   YOB: 1960  Diagnosis: Pulmonary embolism and infarction [facility]) [I26.99]  Acute cystitis without hematuria [N30.00]  Other acute pulmonary embolism without acute cor pulmonale ([facility] [address] [I26.99]                   Date / Time: 11/21/2022 10:58 AM    Patient Admission Status: Inpatient   Readmission Risk (Low < 19, Mod (19-27), High > 27): Readmission Risk Score: 5.4    Current PCP: Jodee Davison MD  PCP verified by CM? Yes    Chart Reviewed: Yes      History Provided by: Patient  Patient Orientation: Alert and Oriented, Person, Place, Situation, Self    Patient Cognition: Alert    Hospitalization in the last 30 days (Readmission):  No    If yes, Readmission Assessment in  Navigator will be completed. Advance Directives:      Code Status: Full Code   Patient's Primary Decision Maker is: Legal Next of Kin    Primary Decision MakerAmil Kandice - [phone]    Discharge Planning:    Patient lives with: Spouse/Significant Other Type of Home: House  Primary Care Giver: Self  Patient Support Systems include: Spouse/Significant Other, Family Members   Current Financial resources: Medicare  Current services prior to admission: C-pap            Current DME:  Cpap            Type of Home Care services:  None    ADLS  Prior functional level: Independent in ADLs/IADLs  Current functional level: Independent in ADLs/IADLs    No current PT/OT orders at this time. Family can provide assistance at DC: Yes  Would you like Case Management to discuss the discharge plan with any other family members/significant others, and if so, who?  No  Plans to Return to Present Housing: Yes  Other Identified Issues/Barriers to RETURNING to current housing: N/A  Potential Assistance needed at discharge: N/A            Potential DME:  None  Patient expects to discharge to: House  Plan for transportation at discharge: Family    Financial    Payor: Lynnette Bell / Plan: [address] [place] [address] PPO / Product Type: *No Product type* /     Does insurance require precert for SNF: Yes    Potential assistance Purchasing Medications: No  Meds-to-Beds request: [facility] [id number], [address] [address] [facility] [phone] Illa Delay [phone]  [address] [address]  Phone: [phone] Fax: [phone]      Notes:    Factors facilitating achievement of predicted outcomes: Family support, Cooperative, Pleasant, and Good insight into deficits    Barriers to discharge: None    Additional Case Management Notes: Patient plans to return home with , family can transport at discharge. Denied any discharge needs. The Plan for Transition of Care is related to the following treatment goals of Pulmonary embolism and infarction [facility]) [I26.99]  Acute cystitis without hematuria [N30.00]  Other acute pulmonary embolism without acute cor pulmonale ([facility] [address] [I26.99]    The Patient and/or Patient Representative Agree with the Discharge Plan?  Yes    Charlene Rosado RN  Case Management Department  Ph: [phone]

## 2022-11-22 NOTE — PROGRESS NOTES
Physical Therapy  [facility]  Physical Therapy Initial Assessment/Discharge Note    Name: Nini Patel  : 1960  MRN: [id number]  Date of Service: 2022    Discharge Recommendations:  Home with assist PRN   PT Equipment Recommendations  Equipment Needed: No    Nini Patel scored a 23/24 on the AM-PAC short mobility form. At this time, recommend pt starting her OP PT for spinal surgery per direction of surgeon . Recommend patient returns to prior setting with prior services. Patient Diagnosis(es): The primary encounter diagnosis was Other acute pulmonary embolism without acute cor pulmonale ([facility] [address] A diagnosis of Acute cystitis without hematuria was also pertinent to this visit. Past Medical History:  has a past medical history of Allergic rhinitis, Chronic allergic rhinitis, Dyspepsia, Hypertension, and Hypothyroidism. Past Surgical History:  has a past surgical history that includes Thyroid surgery (5811/7419); Throat surgery (); Colonoscopy (); vascular surgery (Bilateral); Tonsillectomy; knee surgery (Left, ); Colonoscopy (2017); and knee surgery (Right, 2022).     Assessment   Assessment: pt is a 59 yo female who was adm to [facility] with PE; pt safe to return home without any further therapy indicated  Therapy Prognosis: Good  Decision Making: Low Complexity  Requires PT Follow-Up: No  Activity Tolerance  Activity Tolerance: Patient tolerated treatment well     Plan   Physcial Therapy Plan  Additional Comments: no further therapy indicated; begin PT for her spinal surgery per surgeon  Safety Devices  Type of Devices: Call light within reach, Nurse notified, Left in chair     Restrictions  Position Activity Restriction  Spinal Precautions: No Lifting, No Twisting, No Bending  Other position/activity restrictions: spinal precautions from recent surgery at [facility]     Subjective   General  Chart Reviewed: Yes  Patient assessed for rehabilitation services?: Yes  Additional Pertinent Hx: per H&P note: \"Patient is a 71-year-old female with past medical history of hypertension, obesity who presents to the hospital for right-sided chest pain. According to the patient she was seen by her orthopedics doctor office today where she complained about pain on the right sided rib cage and difficulty taking deep breaths, she was referred to the emergency department for further evaluation. On CT chest patient was found to have PE pulmonary infarct. Patient otherwise denied fevers chills diarrhea constipation dysuria. \"  Response To Previous Treatment: Not applicable  Family / Caregiver Present: Yes ( in room)  Follows Commands: Within Functional Limits  Subjective  Subjective: pt very pleasant and agreeable to working with therapy         Social/Functional History  Social/Functional History  Lives With: Spouse  Type of Home: House  Home Layout: Two level, Bed/Bath upstairs, 1/2 bath on main level  Home Access: Stairs to enter without rails  Entrance Stairs - Number of Steps: 1 DANIELLA  Bathroom Shower/Tub: Tub/Shower unit  Bathroom Toilet: Standard  Home Equipment:  (no DME)  Has the patient had two or more falls in the past year or any fall with injury in the past year?: No  ADL Assistance: [address] [place] [address] Avenue: Independent  Ambulation Assistance: Independent (no AD)  Transfer Assistance: Independent  Active :  (has not driven since Nov 10 (back surgery))  Vision/Hearing  Vision  Vision: Impaired  Vision Exceptions: Wears glasses for reading  Hearing  Hearing: Within functional limits    Cognition   Orientation  Overall Orientation Status: Within Functional Limits  Cognition  Overall Cognitive Status: WFL     Objective   Heart Rate: 80  Heart Rate Source: Monitor  BP: 132/70  BP Location: Left upper arm  BP Method: Automatic  Patient Position: Semi fowlers  MAP (Calculated): 91  Resp: 26  SpO2: 98 %  O2 Device: None (Room air)              AROM RLE (degrees)  RLE AROM: WFL  AROM LLE (degrees)  LLE AROM : WFL  Strength RLE  Strength RLE: WFL  Strength LLE  Strength LLE: WFL           Bed mobility  Supine to Sit: Modified independent  Sit to Supine:  (pt in chair at end of session)  Transfers  Sit to Stand: Modified independent  Stand to Sit: Modified independent  Ambulation  Surface: Level tile  Device: No Device  Assistance: Modified Independent  Quality of Gait: no LOB; stiff torso due to spinal precautions/recent surgery  Distance: 200'  Comments: pt left in chair with all needs in reach     Balance  Sitting - Static: Good  Sitting - Dynamic: Good  Standing - Static: Good  Standing - Dynamic: Good;-           OutComes Score                                                  AM-PAC Score  AM-PAC Inpatient Mobility Raw Score : 23 (11/22/22 1154)  AM-PAC Inpatient T-Scale Score : 56.93 (11/22/22 1154)  Mobility Inpatient CMS 0-100% Score: 11.2 (11/22/22 1154)  Mobility Inpatient CMS G-Code Modifier : CI (11/22/22 1154)          Tinneti Score       Goals          Education  Patient Education  Education Given To: Patient  Education Provided: Role of Therapy  Education Outcome: Verbalized understanding      Therapy Time   Individual Concurrent Group Co-treatment   Time In 1130         Time Out 1155         Minutes 25                 LEONARD VALLADARES PT   Electronically signed by LEONARD VALLADARES PT on 11/22/2022 at 11:56 AM

## 2022-11-22 NOTE — PROGRESS NOTES
Clinical Pharmacy Note  Heparin Dosing       Lab Results   Component Value Date/Time    APTT 138.4 11/22/2022 05:18 AM     Lab Results   Component Value Date/Time    HGB 13.9 11/22/2022 05:18 AM    HCT 39.9 11/22/2022 05:18 AM     11/22/2022 05:18 AM    INR 0.94 11/07/2022 12:49 PM       Current Infusion Rate: 2450 units/hr    Plan:  Hold heparin for 1 hour  Rate: decrease to 2200 units/hr  Next aPTT: 1400 11/22/22    Pharmacy will continue to monitor and adjust based on aPTT results.   Melissa Palmer, PharmD

## 2022-11-22 NOTE — ACP (ADVANCE CARE PLANNING)
Advance Care Planning     Advance Care Planning Activator (Inpatient)  Conversation Note      Date of ACP Conversation: 11/22/2022     Conversation Conducted with: Patient with Decision Making Capacity    ACP Activator: Janina Jessica 45 Decision Maker:     Current Designated Health Care Decision Maker:     Primary Decision Maker: Eduardo Ever - [phone]    Today we documented Decision Maker(s) consistent with Legal Next of Kin hierarchy. Care Preferences    Ventilation: \"If you were in your present state of health and suddenly became very ill and were unable to breathe on your own, what would your preference be about the use of a ventilator (breathing machine) if it were available to you? \"      Would the patient desire the use of ventilator (breathing machine)?: yes    \"If your health worsens and it becomes clear that your chance of recovery is unlikely, what would your preference be about the use of a ventilator (breathing machine) if it were available to you? \"     Would the patient desire the use of ventilator (breathing machine)?: No      Resuscitation  \"CPR works best to restart the heart when there is a sudden event, like a heart attack, in someone who is otherwise healthy. Unfortunately, CPR does not typically restart the heart for people who have serious health conditions or who are very sick. \"    \"In the event your heart stopped as a result of an underlying serious health condition, would you want attempts to be made to restart your heart (answer \"yes\" for attempt to resuscitate) or would you prefer a natural death (answer \"no\" for do not attempt to resuscitate)? \" yes       [] Yes   [] No   Educated Patient / Vikki Georges regarding differences between Advance Directives and portable DNR orders.     Length of ACP Conversation in minutes:      Conversation Outcomes:  [x] ACP discussion completed  [] Existing advance directive reviewed with patient; no changes to patient's previously recorded wishes  [] New Advance Directive completed  [] Portable Do Not Rescitate prepared for Provider review and signature  [] POLST/POST/MOLST/MOST prepared for Provider review and signature      Follow-up plan:    [] Schedule follow-up conversation to continue planning  [] Referred individual to Provider for additional questions/concerns   [] Advised patient/agent/surrogate to review completed ACP document and update if needed with changes in condition, patient preferences or care setting    [x] This note routed to one or more involved healthcare providers    #[phone]  Electronically signed by Kelsie Wilson RN on 11/22/2022 at 11:51 AM

## 2022-11-22 NOTE — CARE COORDINATION
CASE MANAGEMENT DISCHARGE SUMMARY:    DISCHARGE DATE: 11/22/22    DISCHARGED TO HOME     TRANSPORTATION: family             TIME: when ready     PREFERRED PHARMACY: [facility]              #[phone]  Electronically signed by Malorie Miller RN on 11/22/2022 at 1:19 PM

## 2022-11-22 NOTE — PROGRESS NOTES
Started on Eliquis for PE, patient wishes to be discharged, meds to beds ordered, patient is stable for discharge

## 2022-11-23 ENCOUNTER — TELEPHONE (OUTPATIENT)
Dept: CARDIOLOGY CLINIC | Age: 62
End: 2022-11-23

## 2022-11-23 ENCOUNTER — TELEPHONE (OUTPATIENT)
Dept: FAMILY MEDICINE CLINIC | Age: 62
End: 2022-11-23

## 2022-11-23 NOTE — TELEPHONE ENCOUNTER
Eric Oviedo was seen by Dr. Payton Porras in [address]  she came in with clots in the Lungs she stated that he said he wanted to see her within the next week but, I do not have anything open until after the first of the year     Eric Oviedo can be reached at [phone]  thanks

## 2022-11-23 NOTE — TELEPHONE ENCOUNTER
Care Transitions Initial Follow Up Call    Outreach made within 2 business days of discharge: Yes    Patient: Karma Llanes Patient : 1960   MRN: [id number]  Reason for Admission: There are no discharge diagnoses documented for the most recent discharge. Discharge Date: 22       Spoke with: Jayda Kirkland    Discharge department/facility: [facility]    TCM Interactive Patient Contact:  Was patient able to fill all prescriptions: Yes  Was patient instructed to bring all medications to the follow-up visit: Yes  Is patient taking all medications as directed in the discharge summary? Yes  Does patient understand their discharge instructions: Yes  Does patient have questions or concerns that need addressed prior to 7-14 day follow up office visit: no    Scheduled appointment with Cardiologist within 7-14 days    Follow Up  No future appointments.     Octavio Guevara MA

## 2022-12-06 ENCOUNTER — OFFICE VISIT (OUTPATIENT)
Dept: CARDIOLOGY CLINIC | Age: 62
End: 2022-12-06

## 2022-12-06 ENCOUNTER — TELEPHONE (OUTPATIENT)
Dept: CARDIOLOGY CLINIC | Age: 62
End: 2022-12-06

## 2022-12-06 VITALS
DIASTOLIC BLOOD PRESSURE: 80 MMHG | BODY MASS INDEX: 36.82 KG/M2 | SYSTOLIC BLOOD PRESSURE: 140 MMHG | HEART RATE: 82 BPM | WEIGHT: 248.6 LBS | OXYGEN SATURATION: 95 % | HEIGHT: 69 IN

## 2022-12-06 DIAGNOSIS — I26.99 ACUTE PULMONARY EMBOLISM WITHOUT ACUTE COR PULMONALE, UNSPECIFIED PULMONARY EMBOLISM TYPE (HCC): Primary | ICD-10-CM

## 2022-12-06 DIAGNOSIS — R60.0 BILATERAL LOWER EXTREMITY EDEMA: ICD-10-CM

## 2022-12-06 DIAGNOSIS — I10 ESSENTIAL HYPERTENSION: ICD-10-CM

## 2022-12-06 DIAGNOSIS — R06.02 SHORTNESS OF BREATH: ICD-10-CM

## 2022-12-06 NOTE — PATIENT INSTRUCTIONS
Scheduled for right heart catheterization      The morning of your procedure you will park in the hospital parking lot and report directly to the cath lab to check in.     Pre-Procedure Instructions   You will need to fast for at least 8 hours prior to procedure. No caffeine, gum or mints the morning of procedure. You will need to hold your anticoagulation, Eliqus for the morning of the procedure. Hold your diuretic, lasix the morning of procedure. All other medications can be taken in the morning with sips of water. Do not use any lotions, creams or perfume the morning of procedure. Pre-procedure lab work will need to be completed 5-7 days prior to procedure. Please have a responsible adult to drive you home after procedure. We advise you have someone stay with you for the first 24 hours for precautionary measures. Depending on your procedure you may require an overnight stay. Cath lab will provide you with all post procedure instructions. If you have any questions regarding the procedure itself or medications, please call [phone] and ask to speak with a nurse.

## 2022-12-06 NOTE — PROGRESS NOTES
[address] [place]  1960 December 6, 2022    Reason for Consult: Pulmonary Embolus    CC: Shortness of breath    HPI:  The patient is 58 y.o. female with a past medical history significant for essential hypertension who presented to [facility] ED with shortness of breath. She had a lumbar discectomy on 11/10/22 done by Dr. Sharyle Quan. She was on a heparin drip while in the hospital.     Today, she is here for hospital follow up for PE. She has a horrible cough and some pain in her back. She has occasional swelling and she does take Lasix as needed. Patient denies exertional chest pain/pressure, dyspnea at rest, ANTUNEZ, PND, orthopnea, palpitations, lightheadedness, weight changes, and syncope. Review of Systems:  Constitutional: No fatigue, weakness, night sweats or fever. HEENT: No new vision difficulties or ringing in the ears. Respiratory: No new SOB, PND, orthopnea or cough. Cardiovascular: See HPI   GI: No n/v, diarrhea, constipation, abdominal pain or changes in bowel habits. No melena, no hematochezia  : No urinary frequency, urgency, incontinence, hematuria or dysuria. Skin: No cyanosis or skin lesions. Musculoskeletal: No new muscle or joint pain. Neurological: No syncope or TIA-like symptoms.   Psychiatric: No anxiety, insomnia or depression     Past Medical History:   Diagnosis Date    Allergic rhinitis     Chronic allergic rhinitis     Dyspepsia     Hypertension     Hypothyroidism     Pulmonary embolism [facility]) Nov 2022    Sleep apnea 7 years     Past Surgical History:   Procedure Laterality Date    COLONOSCOPY  2009    COLONOSCOPY  08/18/2017    polyp dr Earl Hatch repeat in 5 years    KNEE SURGERY Left 2020    KNEE SURGERY Right 07/2022    arthroscopic    [address]    THYROID SURGERY  4508/5030    TONSILLECTOMY      VASCULAR SURGERY Bilateral     VEIN SURGERY  2015     Family History   Problem Relation Age of Onset    Cancer Paternal Uncle         colon/liver    Cancer Maternal Grandmother         cervical    Cancer Maternal Grandfather         colon    Hypertension Mother      Social History     Tobacco Use    Smoking status: Never    Smokeless tobacco: Never   Vaping Use    Vaping Use: Never used   Substance Use Topics    Alcohol use: Yes     Alcohol/week: 3.0 standard drinks     Types: 3 Glasses of wine per week     Comment: Socially    Drug use: Never       Allergies   Allergen Reactions    Sulfa Antibiotics Rash     Current Outpatient Medications   Medication Sig Dispense Refill    apixaban starter pack (ELIQUIS) 5 MG TBPK tablet Take 1 tablet by mouth See Admin Instructions 74 tablet 0    levothyroxine (SYNTHROID) 150 MCG tablet TAKE ONE TABLET BY MOUTH DAILY 30 tablet 5    acetaminophen (TYLENOL) 500 MG tablet Take 1 tablet by mouth every 6 hours as needed for Pain 30 tablet 0    furosemide (LASIX) 20 MG tablet TAKE ONE TABLET BY MOUTH DAILY (Patient taking differently: Take 20 mg by mouth as needed) 90 tablet 1    lisinopril-hydroCHLOROthiazide (PRINZIDE;ZESTORETIC) 20-25 MG per tablet Take 1 tablet by mouth in the morning. 90 tablet 1    Fexofenadine HCl (ALLEGRA PO) Take 500 mg by mouth daily      progesterone (PROMETRIUM) 200 MG CAPS capsule Take 1 tablet by mouth daily      lansoprazole (PREVACID) 15 MG delayed release capsule Take 15 mg by mouth daily      Cyanocobalamin (VITAMIN B 12) 500 MCG TABS Take 1 tablet by mouth daily      Vitamin D (CHOLECALCIFEROL) 1000 UNITS CAPS capsule Take 1,000 Units by mouth daily       No current facility-administered medications for this visit.        Physical Exam:   BP (!) 140/80 (Site: Right Upper Arm, Position: Sitting, Cuff Size: Large Adult)   Pulse 82   Ht 5' 9\" (1.753 m)   Wt 248 lb 9.6 oz (112.8 kg)   SpO2 95%   BMI 36.71 kg/m²   No intake or output data in the 24 hours ending 12/06/22 1437  Wt Readings from Last 2 Encounters:   12/06/22 248 lb 9.6 oz (112.8 kg)   11/22/22 245 lb 13 oz (111.5 kg)     Constitutional: She is oriented to person, place, and time. She appears well-developed and well-nourished. In no acute distress. Head: Normocephalic and atraumatic. Neck: Neck supple. No JVD present. Carotid bruit is not present. No mass and no thyromegaly present. No lymphadenopathy present. Cardiovascular: Normal rate, regular rhythm, normal heart sounds and intact distal pulses. Exam reveals no gallop and no friction rub. No murmur heard. Pulmonary/Chest: Effort normal and breath sounds normal. No respiratory distress. She has no wheezes, rhonchi or rales. Abdominal: Soft, non-tender. Bowel sounds and aorta are normal. She exhibits no organomegaly, mass or bruit. Extremities: No edema, cyanosis, or clubbing. Pulses are 2+ radial/carotid/dorsalis pedis and posterior tibial bilaterally. Neurological: She is alert and oriented to person, place, and time. She has normal reflexes. No cranial nerve deficit. Coordination normal.   Skin: Skin is warm and dry. There is no rash or diaphoresis. Psychiatric: She has a normal mood and affect. Her speech is normal and behavior is normal.     Personally reviewed and interpreted   EKG Interpretation: Not performed    Imaging   Chest CT 11/21/22:  Pulmonary emboli throughout the right lung as described with an infarct in   the right upper lobe and a trace right pleural effusion. No right heart   strain is noted. Borderline cardiomegaly. Lab Review:   Lab Results   Component Value Date/Time    TRIG 147 11/02/2021 09:12 AM    HDL 36 11/02/2021 09:12 AM    LDLCALC 128 11/02/2021 09:12 AM    LABVLDL 29 11/02/2021 09:12 AM     Lab Results   Component Value Date/Time     11/22/2022 05:18 AM    K 3.3 11/22/2022 05:18 AM    BUN 7 11/22/2022 05:18 AM    CREATININE 0.6 11/22/2022 05:18 AM     No results for input(s): WBC, HGB, HCT, PLT in the last 72 hours.     Assessment:  Pulmonary embolus, acute, with pulmonary infarct  Essential hypertension  Bilateral lower extremity edema  Shortness of breath    Plan:  I will order a lower extremity venous Doppler to assess for DVT due to increased lower extremity edema. I have advised her to take her Lasix 20 mg daily x 5 days and call the office if the cough improves with this. I recommend the patient pursue a right heart catheterization. The risks, benefits and alternatives of the procedure were discussed with the patient. The risks include but are not limited to: vascular injury, bleeding, infection, damage to the pulmonary arteries requiring surgery, injury to surrounding structures and death. The patient is amenable to undergoing the procedure. I will have this scheduled. I will order and echocardiogram to assess RV function. I will see her in follow up after the [address] This note was scribed in the presence of Dr Ruthy Baker, by Rik Nava RN    Physician Attestation:  The scribes documentation has been prepared under my direction and personally reviewed by me in its entirety. I, Dr. Ruthy Baker personally performed the services described in this documentation as scribed by my RN in my presence, and I confirm that the note above accurately reflects all work, treatment, procedures, and medical decision making performed by me.

## 2022-12-06 NOTE — TELEPHONE ENCOUNTER
Patient needs to be scheduled for a RHC with Dr Leilani Bob    Scheduled for right heart catheterization      The morning of your procedure you will park in the hospital parking lot and report directly to the cath lab to check in.     Pre-Procedure Instructions   You will need to fast for at least 8 hours prior to procedure. No caffeine, gum or mints the morning of procedure. You will need to hold your anticoagulation, Eliqus for the morning of the procedure. Hold your diuretic, lasix the morning of procedure. All other medications can be taken in the morning with sips of water. Do not use any lotions, creams or perfume the morning of procedure. Pre-procedure lab work will need to be completed 5-7 days prior to procedure. Please have a responsible adult to drive you home after procedure. We advise you have someone stay with you for the first 24 hours for precautionary measures. Depending on your procedure you may require an overnight stay. Cath lab will provide you with all post procedure instructions. If you have any questions regarding the procedure itself or medications, please call [phone] and ask to speak with a nurse.

## 2022-12-07 NOTE — TELEPHONE ENCOUNTER
Spoke with the patient and got her scheduled for her procedure. We went over the pre-procedure instruction below. She verbalized understanding. Procedure- [facility]  Date: 12/19/2022  Arrival time: 8:30 am   Procedure time: 10:00 am     The morning of your procedure you will park in the hospital parking lot and report directly to the cath lab to check in.      Pre-Procedure Instructions   You will need to fast for at least 8 hours prior to procedure. No caffeine, gum or mints the morning of procedure. You will need to hold your anticoagulation, Eliqus for the morning of the procedure. Hold your diuretic, lasix the morning of procedure. All other medications can be taken in the morning with sips of water. Do not use any lotions, creams or perfume the morning of procedure. Pre-procedure lab work will need to be completed 5-7 days prior to procedure. Please have a responsible adult to drive you home after procedure. We advise you have someone stay with you for the first 24 hours for precautionary measures. Depending on your procedure you may require an overnight stay. Cath lab will provide you with all post procedure instructions. If you have any questions regarding the procedure itself or medications, please call [phone] and ask to speak with a nurse.     [employer] updated & emailed cath lab

## 2022-12-12 DIAGNOSIS — I26.99 ACUTE PULMONARY EMBOLISM WITHOUT ACUTE COR PULMONALE, UNSPECIFIED PULMONARY EMBOLISM TYPE (HCC): ICD-10-CM

## 2022-12-12 DIAGNOSIS — R60.0 BILATERAL LOWER EXTREMITY EDEMA: ICD-10-CM

## 2022-12-12 DIAGNOSIS — I10 PRIMARY HYPERTENSION: ICD-10-CM

## 2022-12-12 DIAGNOSIS — R10.32 LEFT LOWER QUADRANT ABDOMINAL PAIN: ICD-10-CM

## 2022-12-12 LAB
CREAT SERPL-MCNC: 0.7 MG/DL (ref 0.6–1.2)
GFR SERPL CREATININE-BSD FRML MDRD: >60 ML/MIN/{1.73_M2}
HCT VFR BLD CALC: 48.6 % (ref 36–48)
HEMOGLOBIN: 15.6 G/DL (ref 12–16)
MCH RBC QN AUTO: 31.2 PG (ref 26–34)
MCHC RBC AUTO-ENTMCNC: 32.1 G/DL (ref 31–36)
MCV RBC AUTO: 97.2 FL (ref 80–100)
PDW BLD-RTO: 14.2 % (ref 12.4–15.4)
PLATELET # BLD: 340 K/UL (ref 135–450)
PMV BLD AUTO: 8.5 FL (ref 5–10.5)
RBC # BLD: 5 M/UL (ref 4–5.2)
WBC # BLD: 11.5 K/UL (ref 4–11)

## 2022-12-13 ENCOUNTER — TELEPHONE (OUTPATIENT)
Dept: CARDIOLOGY CLINIC | Age: 62
End: 2022-12-13

## 2022-12-13 ENCOUNTER — HOSPITAL ENCOUNTER (OUTPATIENT)
Dept: PHYSICAL THERAPY | Age: 62
Setting detail: THERAPIES SERIES
Discharge: HOME | End: 2022-12-13

## 2022-12-13 LAB
ANION GAP SERPL CALCULATED.3IONS-SCNC: 16 MMOL/L (ref 3–16)
BUN BLDV-MCNC: 9 MG/DL (ref 7–20)
CALCIUM SERPL-MCNC: 10 MG/DL (ref 8.3–10.6)
CHLORIDE BLD-SCNC: 99 MMOL/L (ref 99–110)
CO2: 25 MMOL/L (ref 21–32)
CREAT SERPL-MCNC: 0.8 MG/DL (ref 0.6–1.2)
GFR SERPL CREATININE-BSD FRML MDRD: >60 ML/MIN/{1.73_M2}
GLUCOSE BLD-MCNC: 113 MG/DL (ref 70–99)
POTASSIUM SERPL-SCNC: 3.7 MMOL/L (ref 3.5–5.1)
SODIUM BLD-SCNC: 140 MMOL/L (ref 136–145)

## 2022-12-13 NOTE — TELEPHONE ENCOUNTER
Received call from PT (white) reporting the patient is there for PT and they are no sure if they should proceed given that there is concern for DVT. I advised to hold off on PT until testing is complete. She was noted to have PE on CT scan 11/21/2022. The doppler is scheduled for 12/28. Will send message to  to see if we can schedule for earlier date.

## 2022-12-13 NOTE — FLOWSHEET NOTE
[facility] [facility] [address] [place] [address]  Phone: [phone]   Fax:     [phone]      Physical Therapy  Cancellation/No-show Note  Patient Name:  Deonte Sotelo  :  1960   Date:  2022  Cancelled visits to date: 0- other  No-shows to date: 0    For today's appointment patient:  [x]  Cancelled  []  Rescheduled appointment  []  No-show     Reason given by patient:  []  Patient ill  []  Conflicting appointment  []  No transportation    []  Conflict with work  []  No reason given  [x]  Other:     Comments:  Per chart review saw pt has pulmonary embolism and is scheduled for a heart cath and doppler to assess for DVTs. Called Dr. Amelie Briggs office (heart specialist) and nurse recommended holding off on PT until after she gets these tests completed and results. Pt will call back to reschedule until after she gets these results and will get medical documentation that she is safe to start PT.      Electronically signed by:  [place], PT, DPT

## 2022-12-13 NOTE — TELEPHONE ENCOUNTER
I called scheduling and rescheduled Liberty's doppler for tomorrow, Wednesday 12/14/22 at [facility] arriving at 8:00 am.     I called and went over the date and time with Valentina Neal, she v/u

## 2022-12-14 ENCOUNTER — HOSPITAL ENCOUNTER (OUTPATIENT)
Dept: VASCULAR LAB | Age: 62
Discharge: HOME | End: 2022-12-14
Payer: COMMERCIAL

## 2022-12-14 DIAGNOSIS — R60.0 BILATERAL LOWER EXTREMITY EDEMA: ICD-10-CM

## 2022-12-14 PROCEDURE — 93970 EXTREMITY STUDY: CPT

## 2022-12-19 ENCOUNTER — HOSPITAL ENCOUNTER (OUTPATIENT)
Dept: CARDIAC CATH/INVASIVE PROCEDURES | Age: 62
Discharge: HOME | End: 2022-12-19
Payer: COMMERCIAL

## 2022-12-19 VITALS
BODY MASS INDEX: 36.14 KG/M2 | HEIGHT: 69 IN | DIASTOLIC BLOOD PRESSURE: 73 MMHG | RESPIRATION RATE: 16 BRPM | SYSTOLIC BLOOD PRESSURE: 133 MMHG | WEIGHT: 244 LBS | TEMPERATURE: 98.1 F | HEART RATE: 74 BPM | OXYGEN SATURATION: 95 %

## 2022-12-19 DIAGNOSIS — Z86.718 HX OF VENOUS THROMBOEMBOLIC DISEASE: ICD-10-CM

## 2022-12-19 PROCEDURE — 2500000003 HC RX 250 WO HCPCS

## 2022-12-19 PROCEDURE — 75743 ARTERY X-RAYS LUNGS: CPT

## 2022-12-19 PROCEDURE — 2709999900 HC NON-CHARGEABLE SUPPLY

## 2022-12-19 PROCEDURE — C1769 GUIDE WIRE: HCPCS

## 2022-12-19 PROCEDURE — 36014 PLACE CATHETER IN ARTERY: CPT

## 2022-12-19 PROCEDURE — 93451 RIGHT HEART CATH: CPT

## 2022-12-19 PROCEDURE — 6360000004 HC RX CONTRAST MEDICATION: Performed by: INTERNAL MEDICINE

## 2022-12-19 PROCEDURE — C1894 INTRO/SHEATH, NON-LASER: HCPCS

## 2022-12-19 PROCEDURE — C1751 CATH, INF, PER/CENT/MIDLINE: HCPCS

## 2022-12-19 RX ORDER — SODIUM CHLORIDE 9 MG/ML
INJECTION, SOLUTION INTRAVENOUS PRN
Status: DISCONTINUED | OUTPATIENT
Start: 2022-12-19 | End: 2022-12-20 | Stop reason: HOSPADM

## 2022-12-19 RX ORDER — ACETAMINOPHEN 325 MG/1
650 TABLET ORAL EVERY 4 HOURS PRN
Status: DISCONTINUED | OUTPATIENT
Start: 2022-12-19 | End: 2022-12-20 | Stop reason: HOSPADM

## 2022-12-19 RX ORDER — SODIUM CHLORIDE 0.9 % (FLUSH) 0.9 %
5-40 SYRINGE (ML) INJECTION PRN
Status: DISCONTINUED | OUTPATIENT
Start: 2022-12-19 | End: 2022-12-20 | Stop reason: HOSPADM

## 2022-12-19 RX ORDER — SODIUM CHLORIDE 0.9 % (FLUSH) 0.9 %
5-40 SYRINGE (ML) INJECTION EVERY 12 HOURS SCHEDULED
Status: DISCONTINUED | OUTPATIENT
Start: 2022-12-19 | End: 2022-12-20 | Stop reason: HOSPADM

## 2022-12-19 RX ADMIN — IOPAMIDOL 20 ML: 755 INJECTION, SOLUTION INTRAVENOUS at 10:18

## 2022-12-19 NOTE — DISCHARGE INSTRUCTIONS
ANGIOGRAM    Care of your puncture site:  Remove bandage 24 hours after the procedure. May shower in 24 hours but do not sit in a bathtub/pool of water for 5 days or until the wound is healed. Gently clean groin using soap and water. Dry thoroughly and apply a Band-Aid that covers the entire site. Use Band-Aid until skin heals over in about 3-5 days. Do not apply powder or lotion. Normal Observations:  Soreness or tenderness which may last one week. Mild oozing from the incision site. Possible bruising that could last 2 weeks. Activity:  You may resume driving 24 hours following the procedure. Do not make important / legal decisions within 24 hours after procedure. You may resume normal activity in 5 days or after the wound heals. Avoid lifting more than 10 pounds for 5 days or until the wound heals. Avoid strenuous exercise or activity for 1 week. Nutrition:  Regular diet  Drink at least 8 to 10 glasses of decaffeinated, non-alcoholic fluid for the next 24 hours to flush the x-ray dye used for your angiogram out of your body. Call your doctor immediately if your condition worsens, for any other concerns, for a follow-up appointment or if you experience any of the following:  Increased swelling on the groin or leg. Unusual pain, numbness, or tingling of the groin or down the leg. Any signs of infection such as: redness, yellow drainage at the site, swelling or pain.     IF GROIN STARTS BLEEDING SIGNIFICANTLY:   LAY FLAT, HOLD FIRM DIRECT PRESSURE, AND CALL 911

## 2022-12-19 NOTE — PROCEDURES
here.   Catheter was then pulled back down and directed into the right pulmonary  artery where pulmonary angiogram was performed here. There was no  significant thrombus burden identified. Catheter was then removed over  the wire. The right femoral venous sheath was removed and manual pressure applied  for hemostasis. There were no complications from the procedure and  estimated blood loss was less than 20 mL. No sedation was given for this procedure. FINDINGS:  1. Normal right heart filling pressures with a right atrial pressure of  3 and a pulmonary capillary wedge pressure of 8.  2.  No evidence of pulmonary hypertension with an instantaneous pressure  of 36/9 for a mean pulmonary arterial pressure of 21 mmHg. 3.  Normal mixed venous oxygen saturations with a right atrial  saturation of 70% and pulmonary artery saturation of 72%. This is on  room air. 4.  Mildly reduced cardiac output by Wali equation of 4.28 liters per  minute with a cardiac index of 2.38 liters per kilogram per minute. This is based on a systemic arterial saturation of 96% by pulse oximetry  on room air. 5.  Pulmonary vascular resistance 243 dynes per second per meter  squared.   6.  No evidence of any significant thrombus burden by bilateral  pulmonary angiogram.        Elinor Youngblood MD    D: 12/19/2022 10:30:40       T: 12/19/2022 10:33:50     [email or web address]  Job#: [id number]     Doc#: [id number]    CC:  Christina Godinez MD

## 2022-12-19 NOTE — ANESTHESIA PRE-OP
H&P Update    I have reviewed the history and physical and examined the patient and find no relevant changes. I have reviewed with the patient and/or family the risks, benefits, and alternatives to the procedure. Pre-sedation Assessment    Patient:  Leroy Morris   :   1960  Intended Procedure: Right Heart Catheterization with pulmonary angiogram and possible intervention    [facility] nurse's notes reviewed and agreed. Medications reviewed  Allergies: Allergies   Allergen Reactions    Sulfa Antibiotics Rash         Pre-Procedure Assessment/Plan:  ASA 2 - Patient with mild systemic disease with no functional limitations  Mallampati 3  Level of Sedation Plan:No sedation    Anginal Classification w/in 2 weeks: 0    Heart Failure w/in 2 weeks:  If yes NYHA class: 2    Post Procedure plan: Return to same level of care

## 2022-12-19 NOTE — H&P
Reason for Consult: Pulmonary Embolus     CC: Shortness of breath     HPI:  The patient is 58 y.o. female with a past medical history significant for essential hypertension who presented to [facility] ED with shortness of breath. She had a lumbar discectomy on 11/10/22 done by Dr. Lauri Norris. She was on a heparin drip while in the hospital.      Today, she is here for hospital follow up for PE. She has a horrible cough and some pain in her back. She has occasional swelling and she does take Lasix as needed. Patient denies exertional chest pain/pressure, dyspnea at rest, ANTUNEZ, PND, orthopnea, palpitations, lightheadedness, weight changes, and syncope. Review of Systems:  Constitutional: No fatigue, weakness, night sweats or fever. HEENT: No new vision difficulties or ringing in the ears. Respiratory: No new SOB, PND, orthopnea or cough. Cardiovascular: See HPI   GI: No n/v, diarrhea, constipation, abdominal pain or changes in bowel habits. No melena, no hematochezia  : No urinary frequency, urgency, incontinence, hematuria or dysuria. Skin: No cyanosis or skin lesions. Musculoskeletal: No new muscle or joint pain. Neurological: No syncope or TIA-like symptoms.   Psychiatric: No anxiety, insomnia or depression     Past Medical History        Past Medical History:   Diagnosis Date    Allergic rhinitis      Chronic allergic rhinitis      Dyspepsia      Hypertension      Hypothyroidism      Pulmonary embolism [facility]) Nov 2022    Sleep apnea 7 years         Past Surgical History         Past Surgical History:   Procedure Laterality Date    COLONOSCOPY   2009    COLONOSCOPY   08/18/2017     polyp dr Radha Turpin repeat in 5 years    KNEE SURGERY Left 2020    KNEE SURGERY Right 07/2022     arthroscopic    [address]    THYROID SURGERY   7819/6020    TONSILLECTOMY        VASCULAR SURGERY Bilateral      VEIN SURGERY   2015         Family History         Family History   Problem Relation Age of Onset    Cancer Paternal Uncle           colon/liver    Cancer Maternal Grandmother           cervical    Cancer Maternal Grandfather           colon    Hypertension Mother           Social History            Tobacco Use    Smoking status: Never    Smokeless tobacco: Never   Vaping Use    Vaping Use: Never used   Substance Use Topics    Alcohol use: Yes       Alcohol/week: 3.0 standard drinks       Types: 3 Glasses of wine per week       Comment: Socially    Drug use: Never              Allergies   Allergen Reactions    Sulfa Antibiotics Rash      Current Facility-Administered Medications          Current Outpatient Medications   Medication Sig Dispense Refill    apixaban starter pack (ELIQUIS) 5 MG TBPK tablet Take 1 tablet by mouth See Admin Instructions 74 tablet 0    levothyroxine (SYNTHROID) 150 MCG tablet TAKE ONE TABLET BY MOUTH DAILY 30 tablet 5    acetaminophen (TYLENOL) 500 MG tablet Take 1 tablet by mouth every 6 hours as needed for Pain 30 tablet 0    furosemide (LASIX) 20 MG tablet TAKE ONE TABLET BY MOUTH DAILY (Patient taking differently: Take 20 mg by mouth as needed) 90 tablet 1    lisinopril-hydroCHLOROthiazide (PRINZIDE;ZESTORETIC) 20-25 MG per tablet Take 1 tablet by mouth in the morning. 90 tablet 1    Fexofenadine HCl (ALLEGRA PO) Take 500 mg by mouth daily        progesterone (PROMETRIUM) 200 MG CAPS capsule Take 1 tablet by mouth daily        lansoprazole (PREVACID) 15 MG delayed release capsule Take 15 mg by mouth daily        Cyanocobalamin (VITAMIN B 12) 500 MCG TABS Take 1 tablet by mouth daily        Vitamin D (CHOLECALCIFEROL) 1000 UNITS CAPS capsule Take 1,000 Units by mouth daily          No current facility-administered medications for this visit.             Physical Exam:   BP (!) 140/80 (Site: Right Upper Arm, Position: Sitting, Cuff Size: Large Adult)   Pulse 82   Ht 5' 9\" (1.753 m)   Wt 248 lb 9.6 oz (112.8 kg)   SpO2 95%   BMI 36.71 kg/m²   No intake or output data in the 24 hours ending 12/06/22 1437      Wt Readings from Last 2 Encounters:   12/06/22 248 lb 9.6 oz (112.8 kg)   11/22/22 245 lb 13 oz (111.5 kg)      Constitutional: She is oriented to person, place, and time. She appears well-developed and well-nourished. In no acute distress. Head: Normocephalic and atraumatic. Neck: Neck supple. No JVD present. Carotid bruit is not present. No mass and no thyromegaly present. No lymphadenopathy present. Cardiovascular: Normal rate, regular rhythm, normal heart sounds and intact distal pulses. Exam reveals no gallop and no friction rub. No murmur heard. Pulmonary/Chest: Effort normal and breath sounds normal. No respiratory distress. She has no wheezes, rhonchi or rales. Abdominal: Soft, non-tender. Bowel sounds and aorta are normal. She exhibits no organomegaly, mass or bruit. Extremities: No edema, cyanosis, or clubbing. Pulses are 2+ radial/carotid/dorsalis pedis and posterior tibial bilaterally. Neurological: She is alert and oriented to person, place, and time. She has normal reflexes. No cranial nerve deficit. Coordination normal.   Skin: Skin is warm and dry. There is no rash or diaphoresis. Psychiatric: She has a normal mood and affect. Her speech is normal and behavior is normal.      Personally reviewed and interpreted   EKG Interpretation: Not performed     Imaging   Chest CT 11/21/22:  Pulmonary emboli throughout the right lung as described with an infarct in   the right upper lobe and a trace right pleural effusion. No right heart   strain is noted. Borderline cardiomegaly.          Lab Review:         Lab Results   Component Value Date/Time     TRIG 147 11/02/2021 09:12 AM     HDL 36 11/02/2021 09:12 AM     LDLCALC 128 11/02/2021 09:12 AM     LABVLDL 29 11/02/2021 09:12 AM            Lab Results   Component Value Date/Time      11/22/2022 05:18 AM     K 3.3 11/22/2022 05:18 AM     BUN 7 11/22/2022 05:18 AM     CREATININE 0.6 11/22/2022 05:18 AM      No results for input(s): WBC, HGB, HCT, PLT in the last 72 hours. Assessment:  Pulmonary embolus, acute, with pulmonary infarct  Essential hypertension  Bilateral lower extremity edema  Shortness of breath     Plan:  I will order a lower extremity venous Doppler to assess for DVT due to increased lower extremity edema. I have advised her to take her Lasix 20 mg daily x 5 days and call the office if the cough improves with this. I recommend the patient pursue a right heart catheterization. The risks, benefits and alternatives of the procedure were discussed with the patient. The risks include but are not limited to: vascular injury, bleeding, infection, damage to the pulmonary arteries requiring surgery, injury to surrounding structures and death. The patient is amenable to undergoing the procedure. I will have this scheduled. I will order and echocardiogram to assess RV function. I will see her in follow up after the [address] I have reviewed the most recent H&P for this patient and independently examined the patient. There have been no changes. We will proceed with the planned procedure.     Ilene Thompson MD

## 2022-12-20 ENCOUNTER — TELEPHONE (OUTPATIENT)
Dept: FAMILY MEDICINE CLINIC | Age: 62
End: 2022-12-20

## 2022-12-20 DIAGNOSIS — E89.0 POSTOPERATIVE HYPOTHYROIDISM: Primary | ICD-10-CM

## 2022-12-20 NOTE — TELEPHONE ENCOUNTER
----- Message from Leroy Morris sent at 2022  8:32 AM EST -----  Regarding: Leroy Morris  60  Hello, I would like to request a thyroid blood test since Jorge Luis had a few medical issues in the past 2 months  back surgery and acute pulmonary embolism. I can tell when my levels are off and feel they may be off now.   Thank you, Frutoso Needs

## 2022-12-20 NOTE — TELEPHONE ENCOUNTER
She was normal on 5/2/22  There should not be a relation between the problems she listed  Order placed     Diagnosis Orders   1.  Postoperative hypothyroidism  TSH

## 2022-12-28 ENCOUNTER — HOSPITAL ENCOUNTER (OUTPATIENT)
Dept: NON INVASIVE DIAGNOSTICS | Age: 62
Discharge: HOME | End: 2022-12-28
Payer: COMMERCIAL

## 2022-12-28 ENCOUNTER — APPOINTMENT (OUTPATIENT)
Dept: NON INVASIVE DIAGNOSTICS | Age: 62
End: 2022-12-28
Payer: COMMERCIAL

## 2022-12-28 DIAGNOSIS — I26.99 ACUTE PULMONARY EMBOLISM WITHOUT ACUTE COR PULMONALE, UNSPECIFIED PULMONARY EMBOLISM TYPE (HCC): ICD-10-CM

## 2022-12-28 PROCEDURE — 93306 TTE W/DOPPLER COMPLETE: CPT

## 2022-12-29 ENCOUNTER — TELEPHONE (OUTPATIENT)
Dept: CARDIOLOGY CLINIC | Age: 62
End: 2022-12-29

## 2022-12-29 DIAGNOSIS — E89.0 POSTOPERATIVE HYPOTHYROIDISM: ICD-10-CM

## 2022-12-29 LAB — TSH SERPL DL<=0.05 MIU/L-ACNC: 1.93 UIU/ML (ref 0.27–4.2)

## 2022-12-29 NOTE — TELEPHONE ENCOUNTER
Gary Mares called in returning Dionne's, MA call. I relayed the message that her echo looks good per Dr. Ev Mann. Gary Mares v/u.

## 2022-12-29 NOTE — DISCHARGE SUMMARY
Hospital Medicine Discharge Summary    Patient ID: Deonte Sotelo      Patient's PCP: Andrew Reyes MD    Admit Date: 11/21/2022     Discharge Date: 11/22/2022    Admitting Physician: Bertin Welsh MD     Discharge Physician: Bertin Welsh MD     Discharge Diagnoses: Active Hospital Problems    Diagnosis Date Noted    Pulmonary embolus [facility]) [I26.99] 11/21/2022     Priority: Medium    Essential hypertension [I10]        The patient was seen and examined on day of discharge and this discharge summary is in conjunction with any daily progress note from day of discharge. Condition at discharge - stable    Hospital Course: patient seen and evaluated on the day of discharge. Patient informed about following up with appointments. Patient verbalized understanding for follow-up appointments. The patient and / or the family were informed of the results of tests, a time was given to answer questions, a plan was proposed and they agreed with plan. Medical reconciliation performed. Patient discharged stable condition. On the date of discharge, the patient reported feeling stable. The patient was found to not be in any acute distress, with vital signs within normal limits, and no new abnormalities on physical examination. Further, the patient expressed appropriate understanding of, and agreement with, the discharge recommendations, medications, and plan. Patient is a 63-year-old female with past medical history of hypertension, obesity who presents to the hospital for right-sided chest pain. According to the patient she was seen by her orthopedics doctor office today where she complained about pain on the right sided rib cage and difficulty taking deep breaths, she was referred to the emergency department for further evaluation. On CT chest patient was found to have PE pulmonary infarct. Patient otherwise denied fevers chills diarrhea constipation dysuria.      Assessment  Pulmonary embolism  Acute cystitis  Hypertension  Hypothyroidism    Plan  Patient started on [address] [place], ok to be discharged per cardiology and f/u in office    patient seen and evaluated on the day of discharge. Patient informed about following up with appointments. Patient verbalized understanding for follow-up appointments. The patient and / or the family were informed of the results of tests, a time was given to answer questions, a plan was proposed and they agreed with plan. Medical reconciliation performed. Patient discharged stable condition. On the date of discharge, the patient reported feeling stable. The patient was found to not be in any acute distress, with vital signs within normal limits, and no new abnormalities on physical examination. Further, the patient expressed appropriate understanding of, and agreement with, the discharge recommendations, medications, and plan. Exam:     /70   Pulse 80   Temp 98.3 °F (36.8 °C) (Oral)   Resp 26   Ht 5' 9\" (1.753 m)   Wt 245 lb 13 oz (111.5 kg)   SpO2 98%   BMI 36.30 kg/m²     General appearance: No apparent distress  HEENT:  Conjunctivae/corneas clear. Neck: Supple, No jugular venous distention. Respiratory:  Normal respiratory effort. Clear to auscultation, bilaterally without Rales/Wheezes/Rhonchi. Cardiovascular: Regular rate and rhythm with normal S1/S2 without murmurs, rubs or gallops. Abdomen: Soft, non-tender, non-distended, normal bowel sounds. Musculoskelatal: No clubbing, cyanosis or edema bilaterally. Skin: Skin color, texture, turgor normal.   Neurologic: no focal neurologic deficits. grossly non-focal.  Psychiatric: Alert and oriented, normal mood    Consults:     IP CONSULT TO CARDIOLOGY      Code Status:  Prior    Activity: activity as tolerated    Labs:  For convenience and continuity at follow-up the following most recent labs are provided:      CBC:    Lab Results   Component Value Date/Time    WBC 11.5 12/12/2022 01:38 PM HGB 15.6 12/12/2022 01:38 PM    HCT 48.6 12/12/2022 01:38 PM     12/12/2022 01:38 PM       Renal:    Lab Results   Component Value Date/Time     12/12/2022 01:38 PM    K 3.7 12/12/2022 01:38 PM    K 3.3 11/22/2022 05:18 AM    CL 99 12/12/2022 01:38 PM    CO2 25 12/12/2022 01:38 PM    BUN 9 12/12/2022 01:38 PM    CREATININE 0.7 12/12/2022 01:38 PM    CREATININE 0.8 12/12/2022 01:38 PM    CALCIUM 10.0 12/12/2022 01:38 PM    PHOS 3.9 02/05/2015 01:31 PM       Discharge Medications:     Discharge Medication List as of 11/22/2022  2:10 PM             Details   apixaban starter pack (ELIQUIS) 5 MG TBPK tablet Take 1 tablet by mouth See Admin Instructions, Disp-74 tablet, R-0Normal                Details   cephALEXin (KEFLEX) 500 MG capsule Take 1 capsule by mouth 4 times daily for 5 days, Disp-20 capsule, R-0Normal                Details   levothyroxine (SYNTHROID) 150 MCG tablet TAKE ONE TABLET BY MOUTH DAILY, Disp-30 tablet, R-5Normal      acetaminophen (TYLENOL) 500 MG tablet Take 1 tablet by mouth every 6 hours as needed for Pain, Disp-30 tablet, R-0Normal      furosemide (LASIX) 20 MG tablet TAKE ONE TABLET BY MOUTH DAILY, Disp-90 tablet, R-1Normal      lisinopril-hydroCHLOROthiazide (PRINZIDE;ZESTORETIC) 20-25 MG per tablet Take 1 tablet by mouth in the morning., Disp-90 tablet, R-1Normal      Fexofenadine HCl (ALLEGRA PO) Take 500 mg by mouth dailyHistorical Med      progesterone (PROMETRIUM) 200 MG CAPS capsule Take 1 tablet by mouth dailyHistorical Med      lansoprazole (PREVACID) 15 MG delayed release capsule Take 15 mg by mouth dailyHistorical Med      Cyanocobalamin (VITAMIN B 12) 500 MCG TABS Take 1 tablet by mouth dailyHistorical Med      Vitamin D (CHOLECALCIFEROL) 1000 UNITS CAPS capsule Take 1,000 Units by mouth dailyHistorical Med             Time Spent on discharge is more than 30 mints in the examination, evaluation, counseling and review of medications and discharge plan.      Signed:    Williams Still MD   12/29/2022      Thank you Obdulia Cruz MD for the opportunity to be involved in this patient's care. If you have any questions or concerns please feel free to contact me at [phone].

## 2023-01-03 ENCOUNTER — OFFICE VISIT (OUTPATIENT)
Dept: FAMILY MEDICINE CLINIC | Age: 63
End: 2023-01-03
Payer: COMMERCIAL

## 2023-01-03 VITALS
SYSTOLIC BLOOD PRESSURE: 128 MMHG | HEIGHT: 69 IN | WEIGHT: 246 LBS | DIASTOLIC BLOOD PRESSURE: 74 MMHG | TEMPERATURE: 97.4 F | BODY MASS INDEX: 36.43 KG/M2

## 2023-01-03 DIAGNOSIS — N30.00 ACUTE CYSTITIS WITHOUT HEMATURIA: Primary | ICD-10-CM

## 2023-01-03 LAB
BILIRUBIN, POC: NEGATIVE
BLOOD URINE, POC: ABNORMAL
CLARITY, POC: ABNORMAL
COLOR, POC: YELLOW
GLUCOSE URINE, POC: NEGATIVE
KETONES, POC: NEGATIVE
LEUKOCYTE EST, POC: ABNORMAL
NITRITE, POC: NEGATIVE
PH, POC: 6.5
PROTEIN, POC: ABNORMAL
SPECIFIC GRAVITY, POC: 1.01
UROBILINOGEN, POC: 0.2

## 2023-01-03 PROCEDURE — 3078F DIAST BP <80 MM HG: CPT | Performed by: INTERNAL MEDICINE

## 2023-01-03 PROCEDURE — 3074F SYST BP LT 130 MM HG: CPT | Performed by: INTERNAL MEDICINE

## 2023-01-03 PROCEDURE — 81002 URINALYSIS NONAUTO W/O SCOPE: CPT | Performed by: INTERNAL MEDICINE

## 2023-01-03 PROCEDURE — 99213 OFFICE O/P EST LOW 20 MIN: CPT | Performed by: INTERNAL MEDICINE

## 2023-01-03 RX ORDER — CIPROFLOXACIN 250 MG/1
250 TABLET, FILM COATED ORAL 2 TIMES DAILY
Qty: 10 TABLET | Refills: 0 | Status: SHIPPED | OUTPATIENT
Start: 2023-01-03 | End: 2023-01-08

## 2023-01-03 ASSESSMENT — ENCOUNTER SYMPTOMS
APNEA: 0
RHINORRHEA: 0
WHEEZING: 0
SHORTNESS OF BREATH: 0
ABDOMINAL PAIN: 0
COUGH: 0
SINUS PAIN: 0
SINUS PRESSURE: 0

## 2023-01-03 ASSESSMENT — PATIENT HEALTH QUESTIONNAIRE - PHQ9
SUM OF ALL RESPONSES TO PHQ QUESTIONS 1-9: 0
SUM OF ALL RESPONSES TO PHQ9 QUESTIONS 1 & 2: 0
1. LITTLE INTEREST OR PLEASURE IN DOING THINGS: 0
SUM OF ALL RESPONSES TO PHQ QUESTIONS 1-9: 0
2. FEELING DOWN, DEPRESSED OR HOPELESS: 0
SUM OF ALL RESPONSES TO PHQ QUESTIONS 1-9: 0
SUM OF ALL RESPONSES TO PHQ QUESTIONS 1-9: 0

## 2023-01-03 NOTE — PROGRESS NOTES
Shawn Dunham (:  1960) is a 58 y.o. female,Established patient, here for evaluation of the following chief complaint(s):  Urinary Tract Infection (Patient c/o UTI since 2022; urine frequency, dysuria, hematuria)    Shawn Dunham is a 58 y.o. female with the following history as recorded in [facility]:  Patient Active Problem List    Diagnosis Date Noted    Hx of venous thromboembolic disease     Pulmonary embolus ([facility] [address] 2022    Acute non-recurrent frontal sinusitis 2016    Cellulitis of finger of right hand 2016    MELISSA (obstructive sleep apnea) 2015    HLD (hyperlipidemia) 2015    Bilateral leg edema 2015    Chronic venous hypertension with inflammation involving both sides 2015    Chronic venous insufficiency 2015    Swelling 2015    Family history of colonic polyps 10/22/2014    Menorrhagia 2011    Unilateral complete paralysis of vocal cord 2011    Dyspnea and respiratory abnormality 2011    Thyroid mass     GERD (gastroesophageal reflux disease)     Hypothyroidism     Allergic rhinitis     Essential hypertension     Dysuria 2010    Atopic rhinitis 2010    Skin disorder 2009    Rash 2009    Disorder of function of stomach 2009    Benign neoplasm of thyroid gland 2007    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     Current Outpatient Medications   Medication Sig Dispense Refill    apixaban (ELIQUIS) 5 MG TABS tablet Take 1 tablet by mouth 2 times daily 180 tablet 1    levothyroxine (SYNTHROID) 150 MCG tablet TAKE ONE TABLET BY MOUTH DAILY 30 tablet 5    acetaminophen (TYLENOL) 500 MG tablet Take 1 tablet by mouth every 6 hours as needed for Pain 30 tablet 0    furosemide (LASIX) 20 MG tablet TAKE ONE TABLET BY MOUTH DAILY (Patient taking differently: Take 20 mg by mouth as needed) 90 tablet 1    lisinopril-hydroCHLOROthiazide (PRINZIDE;ZESTORETIC) 20-25 MG per tablet Take 1 tablet by mouth in the morning. 90 tablet 1    Fexofenadine HCl (ALLEGRA PO) Take 500 mg by mouth daily      progesterone (PROMETRIUM) 200 MG CAPS capsule Take 1 tablet by mouth daily      lansoprazole (PREVACID) 15 MG delayed release capsule Take 15 mg by mouth daily      Cyanocobalamin (VITAMIN B 12) 500 MCG TABS Take 1 tablet by mouth daily      Vitamin D (CHOLECALCIFEROL) 1000 UNITS CAPS capsule Take 1,000 Units by mouth daily       No current facility-administered medications for this visit. Allergies: Sulfa antibiotics  Past Medical History:   Diagnosis Date    Allergic rhinitis     Chronic allergic rhinitis     Dyspepsia     Hypertension     Hypothyroidism     Pulmonary embolism [facility]) Nov 2022    Sleep apnea 7 years     Past Surgical History:   Procedure Laterality Date    COLONOSCOPY  2009    COLONOSCOPY  08/18/2017    polyp dr Falcon Fuelling repeat in 5 years    KNEE SURGERY Left 2020    KNEE SURGERY Right 07/2022    arthroscopic    [address]    THYROID SURGERY  2570/8633    TONSILLECTOMY      VASCULAR SURGERY Bilateral     VEIN SURGERY  2015     Family History   Problem Relation Age of Onset    Cancer Paternal Uncle         colon/liver    Cancer Maternal Grandmother         cervical    Cancer Maternal Grandfather         colon    Hypertension Mother      Social History     Tobacco Use    Smoking status: Never    Smokeless tobacco: Never   Substance Use Topics    Alcohol use: Yes     Alcohol/week: 3.0 standard drinks     Types: 3 Glasses of wine per week     Comment: Socially         ASSESSMENT/PLAN:   Diagnosis Orders   1.  Acute cystitis without hematuria  POCT Urinalysis no Micro         Outpatient Encounter Medications as of 1/3/2023   Medication Sig Dispense Refill    ciprofloxacin (CIPRO) 250 MG tablet Take 1 tablet by mouth 2 times daily for 5 days 10 tablet 0    apixaban (ELIQUIS) 5 MG TABS tablet Take 1 tablet by mouth 2 times daily 180 tablet 1    levothyroxine (SYNTHROID) 150 MCG tablet TAKE ONE TABLET BY MOUTH DAILY 30 tablet 5    acetaminophen (TYLENOL) 500 MG tablet Take 1 tablet by mouth every 6 hours as needed for Pain 30 tablet 0    furosemide (LASIX) 20 MG tablet TAKE ONE TABLET BY MOUTH DAILY (Patient taking differently: Take 20 mg by mouth as needed) 90 tablet 1    lisinopril-hydroCHLOROthiazide (PRINZIDE;ZESTORETIC) 20-25 MG per tablet Take 1 tablet by mouth in the morning. 90 tablet 1    Fexofenadine HCl (ALLEGRA PO) Take 500 mg by mouth daily      progesterone (PROMETRIUM) 200 MG CAPS capsule Take 1 tablet by mouth daily      lansoprazole (PREVACID) 15 MG delayed release capsule Take 15 mg by mouth daily      Cyanocobalamin (VITAMIN B 12) 500 MCG TABS Take 1 tablet by mouth daily      Vitamin D (CHOLECALCIFEROL) 1000 UNITS CAPS capsule Take 1,000 Units by mouth daily       No facility-administered encounter medications on file as of 1/3/2023. Orders Placed This Encounter   Procedures    POCT Urinalysis no Micro             Subjective   SUBJECTIVE/OBJECTIVE:  HPI    Review of Systems   Constitutional:  Negative for chills, diaphoresis, fatigue and fever. HENT:  Negative for congestion, postnasal drip, rhinorrhea, sinus pressure and sinus pain. Eyes:  Negative for visual disturbance. Respiratory:  Negative for apnea, cough, shortness of breath and wheezing. Cardiovascular:  Negative for chest pain and palpitations. Gastrointestinal:  Negative for abdominal pain. Genitourinary:  Positive for dysuria. Patient presents with:  Urinary Tract Infection: Patient c/o UTI since 12/25/2022; urine frequency, dysuria, hematuria           Objective   Physical Exam  Vitals and nursing note reviewed. Constitutional:       Appearance: Normal appearance. HENT:      Head: Normocephalic and atraumatic. Cardiovascular:      Rate and Rhythm: Normal rate and regular rhythm.    Pulmonary:      Effort: No respiratory distress. Breath sounds: No wheezing. Abdominal:      General: There is no distension. Tenderness: There is no abdominal tenderness. Skin:     Coloration: Skin is not jaundiced. Findings: No rash. Neurological:      Mental Status: She is alert.                 An electronic signature was used to authenticate this note.    --Arianna Presume, DO

## 2023-01-19 RX ORDER — LISINOPRIL AND HYDROCHLOROTHIAZIDE 25; 20 MG/1; MG/1
1 TABLET ORAL DAILY
Qty: 90 TABLET | Refills: 1 | Status: SHIPPED | OUTPATIENT
Start: 2023-01-19

## 2023-05-17 NOTE — TELEPHONE ENCOUNTER
Last OV: 12/6/22  Next OV: 6/20/23  Last refill: 12/6/22  #180  1 R/F  Most recent Labs: 12/12/22  Last EKG (if needed): 11/21/22

## 2023-05-18 RX ORDER — APIXABAN 5 MG/1
TABLET, FILM COATED ORAL
Qty: 180 TABLET | Refills: 3 | Status: SHIPPED | OUTPATIENT
Start: 2023-05-18

## 2023-05-19 ENCOUNTER — TELEPHONE (OUTPATIENT)
Dept: FAMILY MEDICINE CLINIC | Age: 63
End: 2023-05-19

## 2023-05-21 SDOH — ECONOMIC STABILITY: INCOME INSECURITY: HOW HARD IS IT FOR YOU TO PAY FOR THE VERY BASICS LIKE FOOD, HOUSING, MEDICAL CARE, AND HEATING?: NOT HARD AT ALL

## 2023-05-21 SDOH — ECONOMIC STABILITY: FOOD INSECURITY: WITHIN THE PAST 12 MONTHS, THE FOOD YOU BOUGHT JUST DIDN'T LAST AND YOU DIDN'T HAVE MONEY TO GET MORE.: NEVER TRUE

## 2023-05-21 SDOH — ECONOMIC STABILITY: FOOD INSECURITY: WITHIN THE PAST 12 MONTHS, YOU WORRIED THAT YOUR FOOD WOULD RUN OUT BEFORE YOU GOT MONEY TO BUY MORE.: NEVER TRUE

## 2023-05-21 SDOH — ECONOMIC STABILITY: HOUSING INSECURITY
IN THE LAST 12 MONTHS, WAS THERE A TIME WHEN YOU DID NOT HAVE A STEADY PLACE TO SLEEP OR SLEPT IN A SHELTER (INCLUDING NOW)?: NO

## 2023-05-22 ENCOUNTER — OFFICE VISIT (OUTPATIENT)
Dept: FAMILY MEDICINE CLINIC | Age: 63
End: 2023-05-22
Payer: COMMERCIAL

## 2023-05-22 VITALS
DIASTOLIC BLOOD PRESSURE: 80 MMHG | SYSTOLIC BLOOD PRESSURE: 122 MMHG | HEIGHT: 69 IN | WEIGHT: 247 LBS | BODY MASS INDEX: 36.58 KG/M2 | TEMPERATURE: 98.2 F

## 2023-05-22 DIAGNOSIS — R60.9 DEPENDENT EDEMA: ICD-10-CM

## 2023-05-22 DIAGNOSIS — L03.031 CELLULITIS OF THIRD TOE OF RIGHT FOOT: Primary | ICD-10-CM

## 2023-05-22 PROCEDURE — 99213 OFFICE O/P EST LOW 20 MIN: CPT | Performed by: FAMILY MEDICINE

## 2023-05-22 PROCEDURE — 3074F SYST BP LT 130 MM HG: CPT | Performed by: FAMILY MEDICINE

## 2023-05-22 PROCEDURE — 3079F DIAST BP 80-89 MM HG: CPT | Performed by: FAMILY MEDICINE

## 2023-05-22 RX ORDER — CEPHALEXIN 500 MG/1
500 CAPSULE ORAL 3 TIMES DAILY
Qty: 30 CAPSULE | Refills: 0 | Status: SHIPPED | OUTPATIENT
Start: 2023-05-22 | End: 2023-06-01

## 2023-05-22 ASSESSMENT — PATIENT HEALTH QUESTIONNAIRE - PHQ9
SUM OF ALL RESPONSES TO PHQ QUESTIONS 1-9: 0
1. LITTLE INTEREST OR PLEASURE IN DOING THINGS: 0
SUM OF ALL RESPONSES TO PHQ QUESTIONS 1-9: 0
SUM OF ALL RESPONSES TO PHQ QUESTIONS 1-9: 0
2. FEELING DOWN, DEPRESSED OR HOPELESS: 0
SUM OF ALL RESPONSES TO PHQ QUESTIONS 1-9: 0
SUM OF ALL RESPONSES TO PHQ9 QUESTIONS 1 & 2: 0

## 2023-05-22 NOTE — PROGRESS NOTES
Subjective:      Patient ID: Earl Barry is a 58 y.o. female. Chief Complaint   Patient presents with    Follow-Up from Hospital     [facility]) infected toe         Patient presents with:   Follow-Up from Hospital: [facility]) infected toe     She had a bug bite  On 5/8  And to urgent care on the 13th and treated with zpak   Soaked toe    No fever no chill     YOB: 1960    Date of Visit:  5/22/2023     -- Sulfa Antibiotics -- Rash    Current Outpatient Medications:  ELIQUIS 5 MG TABS tablet, TAKE ONE TABLET BY MOUTH TWICE A DAY, Disp: 180 tablet, Rfl: 3  furosemide (LASIX) 20 MG tablet, TAKE ONE TABLET BY MOUTH DAILY, Disp: 30 tablet, Rfl: 1  levothyroxine (SYNTHROID) 150 MCG tablet, TAKE ONE TABLET BY MOUTH DAILY, Disp: 30 tablet, Rfl: 5  lisinopril-hydroCHLOROthiazide (PRINZIDE;ZESTORETIC) 20-25 MG per tablet, Take 1 tablet by mouth daily, Disp: 90 tablet, Rfl: 1  acetaminophen (TYLENOL) 500 MG tablet, Take 1 tablet by mouth every 6 hours as needed for Pain, Disp: 30 tablet, Rfl: 0  Fexofenadine HCl (ALLEGRA PO), Take 500 mg by mouth daily, Disp: , Rfl:   progesterone (PROMETRIUM) 200 MG CAPS capsule, Take 1 tablet by mouth daily, Disp: , Rfl:   lansoprazole (PREVACID) 15 MG delayed release capsule, Take 1 capsule by mouth daily, Disp: , Rfl:   Cyanocobalamin (VITAMIN B 12) 500 MCG TABS, Take 1 tablet by mouth daily, Disp: , Rfl:   Vitamin D (CHOLECALCIFEROL) 1000 UNITS CAPS capsule, Take 1 capsule by mouth daily, Disp: , Rfl:     No current facility-administered medications for this visit.      ---------------------------               05/22/23                      0758         ---------------------------   BP:          122/80         Site:    Left Upper Arm     Position:     Sitting        Cuff Size:   Large Adult      Temp:   98.2 °F (36.8 °C)   TempSrc:    Temporal        Weight:  247 lb (112 kg)    Height:  5' 9\" (1.753 m)

## 2023-06-19 NOTE — PROGRESS NOTES
SpO2 97%   BMI 36.18 kg/m²   No intake or output data in the 24 hours ending 06/20/23 1529  Wt Readings from Last 2 Encounters:   06/20/23 245 lb (111.1 kg)   05/22/23 247 lb (112 kg)     Constitutional: She is oriented to person, place, and time. She appears well-developed and well-nourished. In no acute distress. Head: Normocephalic and atraumatic. Neck: Neck supple. No JVD present. Carotid bruit is not present. No mass and no thyromegaly present. No lymphadenopathy present. Cardiovascular: Normal rate, regular rhythm, normal heart sounds and intact distal pulses. Exam reveals no gallop and no friction rub. No murmur heard. Pulmonary/Chest: Effort normal and breath sounds normal. No respiratory distress. She has no wheezes, rhonchi or rales. Abdominal: Soft, non-tender. Bowel sounds and aorta are normal. She exhibits no organomegaly, mass or bruit. Extremities: No edema, cyanosis, or clubbing. Pulses are 2+ radial/carotid/dorsalis pedis and posterior tibial bilaterally. Neurological: She is alert and oriented to person, place, and time. She has normal reflexes. No cranial nerve deficit. Coordination normal.   Skin: Skin is warm and dry. There is no rash or diaphoresis. Psychiatric: She has a normal mood and affect. Her speech is normal and behavior is normal.     Personally reviewed and interpreted   EKG Interpretation: 11/21/22 Sinus rhythm     Imaging     Chest CT 11/21/22:  Pulmonary emboli throughout the right lung as described with an infarct in   the right upper lobe and a trace right pleural effusion. No right heart   strain is noted. Borderline cardiomegaly. Echo 12/6/22  Normal left ventricle size, wall thickness, and systolic function with an estimated ejection fraction of 55-60%. No regional wall motion abnormalities are seen. The right ventricle is not well visualized but appears grossly normal in size and function.   Trivial mitral and tricuspid

## 2023-06-20 ENCOUNTER — OFFICE VISIT (OUTPATIENT)
Dept: CARDIOLOGY CLINIC | Age: 63
End: 2023-06-20
Payer: COMMERCIAL

## 2023-06-20 VITALS
BODY MASS INDEX: 36.29 KG/M2 | WEIGHT: 245 LBS | HEIGHT: 69 IN | DIASTOLIC BLOOD PRESSURE: 82 MMHG | OXYGEN SATURATION: 97 % | HEART RATE: 58 BPM | SYSTOLIC BLOOD PRESSURE: 132 MMHG

## 2023-06-20 DIAGNOSIS — R60.0 BILATERAL LOWER EXTREMITY EDEMA: ICD-10-CM

## 2023-06-20 DIAGNOSIS — I10 ESSENTIAL HYPERTENSION: ICD-10-CM

## 2023-06-20 DIAGNOSIS — Z86.711 HISTORY OF PULMONARY EMBOLUS (PE): Primary | ICD-10-CM

## 2023-06-20 PROCEDURE — 3074F SYST BP LT 130 MM HG: CPT | Performed by: INTERNAL MEDICINE

## 2023-06-20 PROCEDURE — 3078F DIAST BP <80 MM HG: CPT | Performed by: INTERNAL MEDICINE

## 2023-06-20 PROCEDURE — 99214 OFFICE O/P EST MOD 30 MIN: CPT | Performed by: INTERNAL MEDICINE

## 2023-07-05 ENCOUNTER — HOSPITAL ENCOUNTER (OUTPATIENT)
Dept: CT IMAGING | Age: 63
Discharge: HOME | End: 2023-07-05
Attending: INTERNAL MEDICINE
Payer: COMMERCIAL

## 2023-07-05 DIAGNOSIS — R60.0 BILATERAL LOWER EXTREMITY EDEMA: ICD-10-CM

## 2023-07-05 DIAGNOSIS — Z86.711 HISTORY OF PULMONARY EMBOLUS (PE): ICD-10-CM

## 2023-07-05 LAB
PERFORMED ON: NORMAL
POC CREATININE: 1 MG/DL (ref 0.6–1.2)
POC SAMPLE TYPE: NORMAL

## 2023-07-05 PROCEDURE — 74174 CTA ABD&PLVS W/CONTRAST: CPT

## 2023-07-05 PROCEDURE — 82565 ASSAY OF CREATININE: CPT

## 2023-07-05 PROCEDURE — 6360000004 HC RX CONTRAST MEDICATION: Performed by: INTERNAL MEDICINE

## 2023-07-05 RX ADMIN — IOPAMIDOL 75 ML: 755 INJECTION, SOLUTION INTRAVENOUS at 07:56

## 2023-07-07 RX ORDER — LISINOPRIL AND HYDROCHLOROTHIAZIDE 25; 20 MG/1; MG/1
TABLET ORAL
Qty: 30 TABLET | Refills: 1 | Status: SHIPPED | OUTPATIENT
Start: 2023-07-07

## 2023-08-08 DIAGNOSIS — R60.9 SWELLING: ICD-10-CM

## 2023-08-08 RX ORDER — FUROSEMIDE 20 MG/1
TABLET ORAL
Qty: 30 TABLET | Refills: 1 | Status: SHIPPED | OUTPATIENT
Start: 2023-08-08

## 2023-08-09 ENCOUNTER — TELEPHONE (OUTPATIENT)
Dept: CARDIOLOGY CLINIC | Age: 63
End: 2023-08-09

## 2023-08-09 NOTE — TELEPHONE ENCOUNTER
Pradeep De La Cruz called sd Dr Loly Parks called her and asked to [facility]. Unknown about what?       Pradeep De La Cruz

## 2023-08-16 ENCOUNTER — NURSE ONLY (OUTPATIENT)
Dept: CARDIOLOGY CLINIC | Age: 63
End: 2023-08-16

## 2023-08-16 DIAGNOSIS — R60.0 BILATERAL LOWER EXTREMITY EDEMA: Primary | ICD-10-CM

## 2023-09-05 ENCOUNTER — HOSPITAL ENCOUNTER (OUTPATIENT)
Dept: WOMENS IMAGING | Age: 63
Discharge: HOME | End: 2023-09-05
Payer: COMMERCIAL

## 2023-09-05 ENCOUNTER — OFFICE VISIT (OUTPATIENT)
Dept: FAMILY MEDICINE CLINIC | Age: 63
End: 2023-09-05
Payer: COMMERCIAL

## 2023-09-05 VITALS — WEIGHT: 239 LBS | BODY MASS INDEX: 35.4 KG/M2 | HEIGHT: 69 IN

## 2023-09-05 VITALS
SYSTOLIC BLOOD PRESSURE: 128 MMHG | WEIGHT: 245.6 LBS | TEMPERATURE: 97.5 F | BODY MASS INDEX: 36.38 KG/M2 | DIASTOLIC BLOOD PRESSURE: 82 MMHG | HEIGHT: 69 IN

## 2023-09-05 DIAGNOSIS — L72.9 CYST OF SKIN: ICD-10-CM

## 2023-09-05 DIAGNOSIS — Z12.31 VISIT FOR SCREENING MAMMOGRAM: ICD-10-CM

## 2023-09-05 DIAGNOSIS — L03.031 CELLULITIS OF THIRD TOE OF RIGHT FOOT: Primary | ICD-10-CM

## 2023-09-05 PROCEDURE — 3074F SYST BP LT 130 MM HG: CPT | Performed by: FAMILY MEDICINE

## 2023-09-05 PROCEDURE — 3079F DIAST BP 80-89 MM HG: CPT | Performed by: FAMILY MEDICINE

## 2023-09-05 PROCEDURE — 99213 OFFICE O/P EST LOW 20 MIN: CPT | Performed by: FAMILY MEDICINE

## 2023-09-05 PROCEDURE — 77063 BREAST TOMOSYNTHESIS BI: CPT

## 2023-09-05 RX ORDER — CEPHALEXIN 500 MG/1
500 CAPSULE ORAL 3 TIMES DAILY
Qty: 30 CAPSULE | Refills: 0 | Status: SHIPPED | OUTPATIENT
Start: 2023-09-05

## 2023-09-05 NOTE — PROGRESS NOTES
Subjective:      Patient ID: Lavonne Dumont is a 58 y.o. female.     Chief Complaint   Patient presents with    Skin Problem     Check raised bump on middle toe on right foot infected         Patient presents with:  Skin Problem: Check raised bump on middle toe on right foot infected     She was better and resolved   And did see dr Leticia Payton  She sat in a warm water pool (naturally occurring) a week ago  That evening the toe area popped up again   Some tender    YOB: 1960    Date of Visit:  9/5/2023     -- Sulfa Antibiotics -- Rash    Current Outpatient Medications:  furosemide (LASIX) 20 MG tablet, TAKE 1 TABLET BY MOUTH DAILY, Disp: 30 tablet, Rfl: 1  lisinopril-hydroCHLOROthiazide (PRINZIDE;ZESTORETIC) 20-25 MG per tablet, TAKE ONE TABLET BY MOUTH DAILY, Disp: 30 tablet, Rfl: 1  ELIQUIS 5 MG TABS tablet, TAKE ONE TABLET BY MOUTH TWICE A DAY, Disp: 180 tablet, Rfl: 3  levothyroxine (SYNTHROID) 150 MCG tablet, TAKE ONE TABLET BY MOUTH DAILY, Disp: 30 tablet, Rfl: 5  acetaminophen (TYLENOL) 500 MG tablet, Take 1 tablet by mouth every 6 hours as needed for Pain, Disp: 30 tablet, Rfl: 0  Fexofenadine HCl (ALLEGRA PO), Take 500 mg by mouth daily, Disp: , Rfl:   progesterone (PROMETRIUM) 200 MG CAPS capsule, Take 1 tablet by mouth daily, Disp: , Rfl:   lansoprazole (PREVACID) 15 MG delayed release capsule, Take 1 capsule by mouth daily, Disp: , Rfl:   Cyanocobalamin (VITAMIN B 12) 500 MCG TABS, Take 1 tablet by mouth daily, Disp: , Rfl:   Vitamin D (CHOLECALCIFEROL) 1000 UNITS CAPS capsule, Take 1 capsule by mouth daily, Disp: , Rfl:     No current facility-administered medications for this visit.      ----------------------------------                   09/05/23                             1319            ----------------------------------   BP:              128/82            Site:        Left Upper Arm        Position:        Sitting            Cuff Size:      Large Adult          Temp:

## 2023-09-06 ENCOUNTER — TELEPHONE (OUTPATIENT)
Dept: FAMILY MEDICINE CLINIC | Age: 63
End: 2023-09-06

## 2023-09-06 LAB
CRYSTALS FLD MICRO: NORMAL
SPECIMEN SOURCE FLD: NORMAL

## 2023-09-06 RX ORDER — LISINOPRIL AND HYDROCHLOROTHIAZIDE 25; 20 MG/1; MG/1
1 TABLET ORAL DAILY
Qty: 30 TABLET | Refills: 5 | Status: SHIPPED | OUTPATIENT
Start: 2023-09-06

## 2023-09-06 NOTE — TELEPHONE ENCOUNTER
called to let pcp know that the urine specimen submitted for Crystal Body Fluid could not be completed because the specimen received was not body fluid. Any questions, please call Kyle Hennessy at [facility] at [phone].

## 2023-09-06 NOTE — TELEPHONE ENCOUNTER
KIN VANEGAS [facility] states yes the techs are trying to place on slide. She will return call if they are unable to.

## 2023-09-06 NOTE — TELEPHONE ENCOUNTER
Let the lab know that was thick material and a scant amount. Can they not place that on a slide and look for crystals?

## 2023-09-07 LAB
FINAL REPORT: NORMAL
PRELIMINARY: NORMAL

## 2023-09-09 LAB
BACTERIA SPEC AEROBE CULT: NORMAL
GRAM STN SPEC: NORMAL

## 2023-09-13 ENCOUNTER — TELEPHONE (OUTPATIENT)
Dept: CARDIOLOGY CLINIC | Age: 63
End: 2023-09-13

## 2023-09-13 NOTE — TELEPHONE ENCOUNTER
There is no cardiac contraindication to colonoscopy and requires no cardiac testing. Eliquis appears to be managed by [facility] but could be held for 2 days and resume when possible.

## 2023-09-13 NOTE — TELEPHONE ENCOUNTER
CARDIAC CLEARANCE     What type of procedure are you having? Colonoscopy    Which physician is performing your procedure? Alexey Davis MD    When is your procedure scheduled for? TBD    Where are you having this procedure? [facility]    Are you taking Blood Thinners? If so what? (Name/dose/frequesncy)  Yes. Eliquis 5mg. 1 PO BID. Does the surgeon want you to stop your blood thinner? If so for how long? Yes.  2 days prior to procedure. Phone Number and Contact Name for Physicians office:  [phone]    Fax number to send information:  [phone]    History:  past medical history significant for essential hypertension who presented to [facility] ED with shortness of breath. She had a lumbar discectomy on 11/10/22. Scanned CC to PT's chart.

## 2023-09-13 NOTE — TELEPHONE ENCOUNTER
Dr. Princess Love, please review and advise for a cardiac clearance in Dr. Brayan nunez. Thank you. Patient will be undergoing a colonoscopy that is not yet scheduled. Requesting to hold Eliquis for 2 days prior. History of PE 11/2022 s/p lumbar discectomy. Seen in office 6/20/23.

## 2023-09-15 ENCOUNTER — TELEPHONE (OUTPATIENT)
Dept: CARDIOLOGY CLINIC | Age: 63
End: 2023-09-15

## 2023-09-15 NOTE — TELEPHONE ENCOUNTER
Laila Banda from [address] [place] [address] called wanting to request documentation that supported the need of a lymphedema pump for Palmer Schilling. Laila Banda states that they received notes from the office but nothing showed support of active lymphedema. Please advise.     Peggy's fax #: [phone]

## 2023-09-30 DIAGNOSIS — R60.9 SWELLING: ICD-10-CM

## 2023-10-02 ENCOUNTER — TELEPHONE (OUTPATIENT)
Dept: FAMILY MEDICINE CLINIC | Age: 63
End: 2023-10-02

## 2023-10-02 DIAGNOSIS — R60.9 SWELLING: ICD-10-CM

## 2023-10-02 RX ORDER — LEVOTHYROXINE SODIUM 0.15 MG/1
150 TABLET ORAL DAILY
Qty: 30 TABLET | Refills: 5 | Status: SHIPPED | OUTPATIENT
Start: 2023-10-02

## 2023-10-02 RX ORDER — FUROSEMIDE 20 MG/1
20 TABLET ORAL DAILY
Qty: 30 TABLET | Refills: 5 | Status: SHIPPED | OUTPATIENT
Start: 2023-10-02

## 2023-10-02 NOTE — TELEPHONE ENCOUNTER
Done    Orders Placed This Encounter   Medications    furosemide (LASIX) 20 MG tablet     Sig: Take 1 tablet by mouth daily     Dispense:  30 tablet     Refill:  5    levothyroxine (SYNTHROID) 150 MCG tablet     Sig: Take 1 tablet by mouth daily     Dispense:  30 tablet     Refill:  5

## 2023-10-02 NOTE — TELEPHONE ENCOUNTER
----- Message from Reynaldo Knox sent at 10/2/2023 12:54 PM EDT -----  Subject: Refill Request    QUESTIONS  Name of Medication? levothyroxine (SYNTHROID) 150 MCG tablet  Patient-reported dosage and instructions? 150mcg (1 per day)  How many days do you have left? 2  [id number]  Pharmacy phone number (if available)? [phone]  Additional Information for Provider? pt was told to call PCP to get this   filled. [place] of [place] till 10/11/23, pt   has an appt for 10/12/23 but needs this refilled today  ---------------------------------------------------------------------------  --------------,  Name of Medication? furosemide (LASIX) 20 MG tablet  Patient-reported dosage and instructions? 20 mg (1 per day)  How many days do you have left? 2  [facility] [id number]  Pharmacy phone number (if available)? [phone]  Additional Information for Provider? pt was told to call PCP to get this   filled. [place] is going [place] the country [place] of tomorrow till 10/11/23, pt   has an appt for 10/12/23 but needs this refilled today  ---------------------------------------------------------------------------  --------------  CALL BACK INFO  What is the best way for the office to contact you? OK to leave message on   voicemail  Preferred Call Back Phone Number? [phone]  ---------------------------------------------------------------------------  --------------  SCRIPT ANSWERS  Relationship to Patient?  Self

## 2023-10-03 RX ORDER — LEVOTHYROXINE SODIUM 0.15 MG/1
TABLET ORAL
Qty: 30 TABLET | Refills: 5 | OUTPATIENT
Start: 2023-10-03

## 2023-10-03 RX ORDER — FUROSEMIDE 20 MG/1
TABLET ORAL
Qty: 30 TABLET | Refills: 1 | OUTPATIENT
Start: 2023-10-03

## 2023-10-10 ENCOUNTER — TELEPHONE (OUTPATIENT)
Dept: CARDIOLOGY CLINIC | Age: 63
End: 2023-10-10

## 2023-10-10 NOTE — TELEPHONE ENCOUNTER
Leonardo Goetz from [address] [place] [address] called in this morning, she would like a call concerning a medical necessity form that needs to be signed by Dr. Loly Parks for a lymphadema pump that was faxed over on 10/2 and 10/5 and they havent gotten a response.       She can be reached at [phone] ext [phone]

## 2023-12-28 DIAGNOSIS — R60.9 DEPENDENT EDEMA: ICD-10-CM

## 2023-12-28 DIAGNOSIS — R73.09 ELEVATED GLUCOSE: ICD-10-CM

## 2023-12-28 DIAGNOSIS — R79.89 ABNORMAL CBC: ICD-10-CM

## 2023-12-28 LAB
ANION GAP SERPL CALCULATED.3IONS-SCNC: 11 MMOL/L (ref 3–16)
BASOPHILS # BLD: 0 K/UL (ref 0–0.2)
BASOPHILS NFR BLD: 0 %
BUN SERPL-MCNC: 10 MG/DL (ref 7–20)
CALCIUM SERPL-MCNC: 9.3 MG/DL (ref 8.3–10.6)
CHLORIDE SERPL-SCNC: 104 MMOL/L (ref 99–110)
CO2 SERPL-SCNC: 24 MMOL/L (ref 21–32)
CREAT SERPL-MCNC: 0.6 MG/DL (ref 0.6–1.2)
DEPRECATED RDW RBC AUTO: 13.3 % (ref 12.4–15.4)
EOSINOPHIL # BLD: 0.1 K/UL (ref 0–0.6)
EOSINOPHIL NFR BLD: 1 %
GFR SERPLBLD CREATININE-BSD FMLA CKD-EPI: >60 ML/MIN/{1.73_M2}
GLUCOSE SERPL-MCNC: 104 MG/DL (ref 70–99)
HCT VFR BLD AUTO: 47.1 % (ref 36–48)
HGB BLD-MCNC: 16 G/DL (ref 12–16)
LYMPHOCYTES # BLD: 1.7 K/UL (ref 1–5.1)
LYMPHOCYTES NFR BLD: 12 %
MCH RBC QN AUTO: 31.5 PG (ref 26–34)
MCHC RBC AUTO-ENTMCNC: 34 G/DL (ref 31–36)
MCV RBC AUTO: 92.5 FL (ref 80–100)
MONOCYTES # BLD: 0.5 K/UL (ref 0–1.3)
MONOCYTES NFR BLD: 4 %
NEUTROPHILS # BLD: 10.6 K/UL (ref 1.7–7.7)
NEUTROPHILS NFR BLD: 79 %
NEUTS BAND NFR BLD MANUAL: 3 % (ref 0–7)
PLATELET # BLD AUTO: 307 K/UL (ref 135–450)
PLATELET BLD QL SMEAR: ADEQUATE
PMV BLD AUTO: 8.2 FL (ref 5–10.5)
POTASSIUM SERPL-SCNC: 4 MMOL/L (ref 3.5–5.1)
RBC # BLD AUTO: 5.09 M/UL (ref 4–5.2)
RBC MORPH BLD: NORMAL
SLIDE REVIEW: ABNORMAL
SODIUM SERPL-SCNC: 139 MMOL/L (ref 136–145)
TOXIC GRANULES BLD QL SMEAR: PRESENT
VARIANT LYMPHS NFR BLD MANUAL: 1 % (ref 0–6)
WBC # BLD AUTO: 12.9 K/UL (ref 4–11)

## 2023-12-29 LAB
EST. AVERAGE GLUCOSE BLD GHB EST-MCNC: 93.9 MG/DL
HBA1C MFR BLD: 4.9 %

## 2024-01-02 ENCOUNTER — OFFICE VISIT (OUTPATIENT)
Dept: FAMILY MEDICINE CLINIC | Age: 64
End: 2024-01-02
Payer: COMMERCIAL

## 2024-01-02 VITALS
WEIGHT: 237.6 LBS | DIASTOLIC BLOOD PRESSURE: 78 MMHG | BODY MASS INDEX: 35.19 KG/M2 | TEMPERATURE: 97.2 F | HEIGHT: 69 IN | SYSTOLIC BLOOD PRESSURE: 120 MMHG

## 2024-01-02 DIAGNOSIS — J02.9 SORE THROAT: ICD-10-CM

## 2024-01-02 DIAGNOSIS — H66.001 NON-RECURRENT ACUTE SUPPURATIVE OTITIS MEDIA OF RIGHT EAR WITHOUT SPONTANEOUS RUPTURE OF TYMPANIC MEMBRANE: Primary | ICD-10-CM

## 2024-01-02 LAB — S PYO AG THROAT QL: NORMAL

## 2024-01-02 PROCEDURE — 3074F SYST BP LT 130 MM HG: CPT | Performed by: FAMILY MEDICINE

## 2024-01-02 PROCEDURE — 99213 OFFICE O/P EST LOW 20 MIN: CPT | Performed by: FAMILY MEDICINE

## 2024-01-02 PROCEDURE — 3078F DIAST BP <80 MM HG: CPT | Performed by: FAMILY MEDICINE

## 2024-01-02 PROCEDURE — 87880 STREP A ASSAY W/OPTIC: CPT | Performed by: FAMILY MEDICINE

## 2024-01-02 RX ORDER — BENZONATATE 100 MG/1
100 CAPSULE ORAL 3 TIMES DAILY PRN
Qty: 30 CAPSULE | Refills: 0 | Status: SHIPPED | OUTPATIENT
Start: 2024-01-02 | End: 2024-01-12

## 2024-01-02 RX ORDER — AMOXICILLIN AND CLAVULANATE POTASSIUM 875; 125 MG/1; MG/1
1 TABLET, FILM COATED ORAL 2 TIMES DAILY
Qty: 20 TABLET | Refills: 0 | Status: SHIPPED | OUTPATIENT
Start: 2024-01-02 | End: 2024-01-12

## 2024-01-02 ASSESSMENT — PATIENT HEALTH QUESTIONNAIRE - PHQ9
SUM OF ALL RESPONSES TO PHQ QUESTIONS 1-9: 0
2. FEELING DOWN, DEPRESSED OR HOPELESS: 0
1. LITTLE INTEREST OR PLEASURE IN DOING THINGS: 0
SUM OF ALL RESPONSES TO PHQ9 QUESTIONS 1 & 2: 0

## 2024-01-02 NOTE — PROGRESS NOTES
Subjective:      Patient ID: Liberty Crump is a 63 y.o. female.    Chief Complaint   Patient presents with    Congestion     Fever, cough, achy, running nose, sore throat x 1 week        Patient presents with:  Congestion: Fever, cough, achy, running nose, sore throat x 1 week    Here for the above  Right hear pain  Cough is productive discolored    with same   Fever to 101  Pnd  And burning throat   Some discomfort in cheeks     YOB: 1960    Date of Visit:  1/2/2024     -- Sulfa Antibiotics -- Rash    Current Outpatient Medications:  furosemide (LASIX) 20 MG tablet, Take 1 tablet by mouth daily, Disp: 30 tablet, Rfl: 5  levothyroxine (SYNTHROID) 150 MCG tablet, Take 1 tablet by mouth daily, Disp: 30 tablet, Rfl: 5  lisinopril-hydroCHLOROthiazide (PRINZIDE;ZESTORETIC) 20-25 MG per tablet, Take 1 tablet by mouth daily, Disp: 30 tablet, Rfl: 5  cephALEXin (KEFLEX) 500 MG capsule, Take 1 capsule by mouth 3 times daily, Disp: 30 capsule, Rfl: 0  ELIQUIS 5 MG TABS tablet, TAKE ONE TABLET BY MOUTH TWICE A DAY, Disp: 180 tablet, Rfl: 3  acetaminophen (TYLENOL) 500 MG tablet, Take 1 tablet by mouth every 6 hours as needed for Pain, Disp: 30 tablet, Rfl: 0  Fexofenadine HCl (ALLEGRA PO), Take 500 mg by mouth daily, Disp: , Rfl:   progesterone (PROMETRIUM) 200 MG CAPS capsule, Take 1 tablet by mouth daily, Disp: , Rfl:   lansoprazole (PREVACID) 15 MG delayed release capsule, Take 1 capsule by mouth daily, Disp: , Rfl:   Cyanocobalamin (VITAMIN B 12) 500 MCG TABS, Take 1 tablet by mouth daily, Disp: , Rfl:   Vitamin D (CHOLECALCIFEROL) 1000 UNITS CAPS capsule, Take 1 capsule by mouth daily, Disp: , Rfl:     No current facility-administered medications for this visit.      ----------------------------------                   01/02/24                             1306            ----------------------------------   BP:              120/78            Site:        Left Upper Arm

## 2024-01-12 ENCOUNTER — TELEPHONE (OUTPATIENT)
Dept: FAMILY MEDICINE CLINIC | Age: 64
End: 2024-01-12

## 2024-01-12 DIAGNOSIS — J06.9 ACUTE UPPER RESPIRATORY INFECTION, UNSPECIFIED: Primary | ICD-10-CM

## 2024-01-12 DIAGNOSIS — H92.09 OTALGIA, UNSPECIFIED LATERALITY: ICD-10-CM

## 2024-01-12 NOTE — TELEPHONE ENCOUNTER
Done   Diagnosis Orders   1. Acute upper respiratory infection, unspecified  XR CHEST (2 VW)      2. Otalgia, unspecified laterality  Noah Mota MD, Otolaryngology, [facility]

## 2024-01-12 NOTE — TELEPHONE ENCOUNTER
Liberty advised and verbalized understanding. She states yes her ear hurts occasionally but she states she can't hear out of it. Advised to see ENT and gave Dr. Jose [phone]. (Please add diagnosis to pending referral).

## 2024-01-12 NOTE — TELEPHONE ENCOUNTER
Is her ear still hurting?  If so I will need to have her see ent   And I will order chest film for today     Diagnosis Orders   1. Acute upper respiratory infection, unspecified  XR CHEST (2 VW)

## 2024-01-12 NOTE — TELEPHONE ENCOUNTER
Patient was on 01/02/2024 and is still coughing and she did complete antibiotic.    Please advise.    Pharmacy is [facility] in [place].

## 2024-01-15 ENCOUNTER — HOSPITAL ENCOUNTER (OUTPATIENT)
Dept: GENERAL RADIOLOGY | Age: 64
Discharge: HOME | End: 2024-01-15
Payer: COMMERCIAL

## 2024-01-15 ENCOUNTER — HOSPITAL ENCOUNTER (OUTPATIENT)
Age: 64
Discharge: HOME | End: 2024-01-15
Payer: COMMERCIAL

## 2024-01-15 DIAGNOSIS — J06.9 ACUTE UPPER RESPIRATORY INFECTION, UNSPECIFIED: ICD-10-CM

## 2024-01-15 PROCEDURE — 71046 X-RAY EXAM CHEST 2 VIEWS: CPT

## 2024-01-15 RX ORDER — ALBUTEROL SULFATE 90 UG/1
2 AEROSOL, METERED RESPIRATORY (INHALATION) 4 TIMES DAILY PRN
Qty: 18 G | Refills: 0 | Status: SHIPPED | OUTPATIENT
Start: 2024-01-15

## 2024-01-25 ENCOUNTER — OFFICE VISIT (OUTPATIENT)
Dept: ENT CLINIC | Age: 64
End: 2024-01-25
Payer: COMMERCIAL

## 2024-01-25 VITALS
OXYGEN SATURATION: 98 % | HEIGHT: 69 IN | DIASTOLIC BLOOD PRESSURE: 73 MMHG | HEART RATE: 75 BPM | BODY MASS INDEX: 35.09 KG/M2 | SYSTOLIC BLOOD PRESSURE: 125 MMHG

## 2024-01-25 DIAGNOSIS — H91.93 BILATERAL HEARING LOSS, UNSPECIFIED HEARING LOSS TYPE: ICD-10-CM

## 2024-01-25 DIAGNOSIS — H69.93 DYSFUNCTION OF BOTH EUSTACHIAN TUBES: Primary | ICD-10-CM

## 2024-01-25 PROCEDURE — 3074F SYST BP LT 130 MM HG: CPT | Performed by: OTOLARYNGOLOGY

## 2024-01-25 PROCEDURE — 3078F DIAST BP <80 MM HG: CPT | Performed by: OTOLARYNGOLOGY

## 2024-01-25 PROCEDURE — 99203 OFFICE O/P NEW LOW 30 MIN: CPT | Performed by: OTOLARYNGOLOGY

## 2024-01-25 NOTE — PROGRESS NOTES
[facility]  DIVISION OF OTOLARYNGOLOGY- HEAD & NECK SURGERY  CONSULT      Patient Name: Liberty Crump  Medical Record Number:  [id number]  Primary Care Physician:  Cristian Banks MD  Date of Consultation: 1/25/2024    Chief Complaint: Ear issues        HISTORY OF PRESENT ILLNESS  Liberty is a(n) 63 y.o. female who presents for evaluation of ear issues.  The patient has had issues with the right ear for a while.  She had fluid in there a few times.  She said when she flies she has pain in her ears, but she is not 100% sure which ear hurts or if both do.  She states she did have an upper respiratory infection and had an ear infection on the right side recently.  It has gotten better.  She has never had ear surgery.            REVIEW OF SYSTEMS    As above  PHYSICAL EXAM  GENERAL: No Acute Distress, Alert and Oriented, no Hoarseness, strong voice  EYES: EOMI, Anti-icteric  HENT:   Head: Normocephalic and atraumatic.   Face:  Symmetric, facial nerve intact, no sinus tenderness  Ears: See below  Mouth/Oral Cavity:  normal lips, Uvula is midline, no mucosal lesions, no trismus  Oropharynx/Larynx:  normal oropharynx  Nose:Normal external nasal appearance.  Anterior rhinoscopy shows no polyps or purulent drainage  NECK: Normal range of motion, no thyromegaly, trachea is midline, no lymphadenopathy, no neck masses, no crepitus          PROCEDURE  Bilateral ear exam.  The right ear was visualized with the binocular scope.  Tympanic membrane is intact, but mildly retracted.  I do not appreciate a middle ear effusion.  No left side tympanic membrane intact with aerated middle ear.  Lyman was midline        ASSESSMENT/PLAN  1. Dysfunction of both eustachian tubes  I think the patient has chronic eustachian tube dysfunction.  She has had intermittent pain when flying as well as fluid in the right ear.  She did have an ear infection associated with an upper respiratory infection over a month ago.  To me the infection has resolved.  I

## 2024-02-07 DIAGNOSIS — H91.90 HEARING LOSS, UNSPECIFIED HEARING LOSS TYPE, UNSPECIFIED LATERALITY: Primary | ICD-10-CM

## 2024-02-08 RX ORDER — LISINOPRIL AND HYDROCHLOROTHIAZIDE 25; 20 MG/1; MG/1
1 TABLET ORAL DAILY
Qty: 30 TABLET | Refills: 5 | Status: SHIPPED | OUTPATIENT
Start: 2024-02-08

## 2024-02-23 NOTE — PROGRESS NOTES
tympanogram, consistent with normal middle ear function.  Acoustic Reflexes: Ipsilateral: Could not maintain seal. Contralateral: Could not maintain seal.    Reliability: Good  Transducer: HF Headphones    See scanned audiogram dated 2/26/2024  for results.        PATIENT EDUCATION:     The following items were discussed with the patient:   - Good Communication Strategies  - Hearing Loss and Hearing Aids    Educational information was shared in the After Visit Summary.                                                RECOMMENDATIONS:                                                                                                                                                                                                                                                          The following items are recommended based on patient report and results from today's appointment:   - Continue medical follow-up with Noah Jose MD.   - Retest hearing as medically indicated and/or sooner if a change in hearing is noted.  - If desired, schedule a Hearing Aid Evaluation (HAE) appointment to discuss hearing aid options.  - Utilize \"Good Communication Strategies\" as discussed to assist in speech understanding with communication partners.       Rose Marie Weaver  Audiologist    Chart CC'd to: Noah Jose MD      Degree of   Hearing Sensitivity dB Range   Within Normal Limits (WNL) 0 - 20   Mild 25 - 40   Moderate 45 - 55   Moderately-Severe 60 - 70   Severe 75 - 90   Profound 95 +

## 2024-02-26 ENCOUNTER — OFFICE VISIT (OUTPATIENT)
Dept: ENT CLINIC | Age: 64
End: 2024-02-26
Payer: COMMERCIAL

## 2024-02-26 ENCOUNTER — PROCEDURE VISIT (OUTPATIENT)
Dept: AUDIOLOGY | Age: 64
End: 2024-02-26
Payer: COMMERCIAL

## 2024-02-26 VITALS
SYSTOLIC BLOOD PRESSURE: 124 MMHG | BODY MASS INDEX: 34.96 KG/M2 | HEIGHT: 69 IN | OXYGEN SATURATION: 95 % | DIASTOLIC BLOOD PRESSURE: 82 MMHG | HEART RATE: 66 BPM | WEIGHT: 236 LBS

## 2024-02-26 DIAGNOSIS — H92.01 RIGHT EAR PAIN: Primary | ICD-10-CM

## 2024-02-26 DIAGNOSIS — H69.93 DYSFUNCTION OF BOTH EUSTACHIAN TUBES: Primary | ICD-10-CM

## 2024-02-26 PROCEDURE — 3074F SYST BP LT 130 MM HG: CPT | Performed by: OTOLARYNGOLOGY

## 2024-02-26 PROCEDURE — 3079F DIAST BP 80-89 MM HG: CPT | Performed by: OTOLARYNGOLOGY

## 2024-02-26 PROCEDURE — 99213 OFFICE O/P EST LOW 20 MIN: CPT | Performed by: OTOLARYNGOLOGY

## 2024-02-26 PROCEDURE — 92567 TYMPANOMETRY: CPT | Performed by: AUDIOLOGIST

## 2024-02-26 PROCEDURE — 92557 COMPREHENSIVE HEARING TEST: CPT | Performed by: AUDIOLOGIST

## 2024-02-26 NOTE — PROGRESS NOTES
[facility]  DIVISION OF OTOLARYNGOLOGY- HEAD & NECK SURGERY  Follow up      Patient Name: Liberty Crump  Medical Record Number:  [id number]  Primary Care Physician:  Cristian Banks MD  Date of Consultation: 2/26/2024    Chief Complaint: Ear issues        Interval History    Patient following up for her ear issues.  I saw her on January 25 and she had what seemed like eustachian tube dysfunction.  She said she is feeling better.  Her right ear is worse than her left.  She still does have some pressure in that ear on occasion.          REVIEW OF SYSTEMS  As above    PHYSICAL EXAM  GENERAL: No Acute Distress, Alert and Oriented, no Hoarseness, strong voice  EYES: EOMI, Anti-icteric  HENT:   Head: Normocephalic and atraumatic.   Face:  Symmetric, facial nerve intact, no sinus tenderness  Right Ear: The retracted tympanic membrane with an aerated middle ear  Left Ear: Normal external ear, normal external auditory canal, intact tympanic membrane with normal mobility and aerated middle ear  Mouth/Oral Cavity:  normal lips, Uvula is midline, no mucosal lesions, no trismus  Oropharynx/Larynx:  normal oropharynx,  Nose:Normal external nasal appearance.    NECK: Normal range of motion, no thyromegaly, trachea is midline, no lymphadenopathy, no neck masses, no crepitus    Patient had an audiogram today that shows normal hearing with type a tympanograms.  She does have some negative pressure on the right side        ASSESSMENT/PLAN  1. Dysfunction of both eustachian tubes  This is better after getting over her upper respiratory infection.  She does still have some negative pressure on the right side and it is her worst ear.  Given the normal hearing I told her I would probably hold off on an ear tube for now unless it becomes worse or you are more persistent.  I would like to see her in 6 months.  If she is having issues prior to that time I told her to follow-up sooner.             I have performed a head and neck physical exam

## 2024-02-26 NOTE — PATIENT INSTRUCTIONS
into a person’s ear      Some additional items that may be helpful:   - Remain patient - this is important for both parties   - Write down items that still cannot be heard/understood.  You may write with pen/paper or consider typing/texting on a cell phone or smart device.   - If background noise is unavoidable, try to keep yourself in a good position in the room.  By sitting at a augustine on the side of the restaurant (preferably a corner), it will be easier to communicate than if you were sitting at a table in the middle with background noise surrounding you.  Keep yourself positioned away from music speakers or heavy foot traffic.   
Yes

## 2024-03-07 DIAGNOSIS — R60.9 SWELLING: ICD-10-CM

## 2024-03-08 RX ORDER — FUROSEMIDE 20 MG/1
20 TABLET ORAL DAILY
Qty: 30 TABLET | Refills: 5 | Status: SHIPPED | OUTPATIENT
Start: 2024-03-08

## 2024-03-08 RX ORDER — LEVOTHYROXINE SODIUM 0.15 MG/1
150 TABLET ORAL DAILY
Qty: 30 TABLET | Refills: 5 | Status: SHIPPED | OUTPATIENT
Start: 2024-03-08

## 2024-04-02 RX ORDER — APIXABAN 5 MG/1
5 TABLET, FILM COATED ORAL 2 TIMES DAILY
Qty: 180 TABLET | Refills: 3 | Status: SHIPPED | OUTPATIENT
Start: 2024-04-02

## 2024-04-02 NOTE — TELEPHONE ENCOUNTER
Last OV: 12/20/23  Next OV: 12/18/24  Last refill: 5/18/23 #180  3 R/F  Most recent Labs: 12/28/23  Last EKG (if needed):

## 2024-06-18 ENCOUNTER — TELEMEDICINE (OUTPATIENT)
Dept: FAMILY MEDICINE CLINIC | Age: 64
End: 2024-06-18
Payer: COMMERCIAL

## 2024-06-18 DIAGNOSIS — A09 TRAVELER'S DIARRHEA: Primary | ICD-10-CM

## 2024-06-18 PROCEDURE — 99213 OFFICE O/P EST LOW 20 MIN: CPT

## 2024-06-18 RX ORDER — AZITHROMYCIN 250 MG/1
TABLET, FILM COATED ORAL
Qty: 6 TABLET | Refills: 0 | Status: SHIPPED | OUTPATIENT
Start: 2024-06-18 | End: 2024-06-28

## 2024-06-18 RX ORDER — LOPERAMIDE HYDROCHLORIDE 2 MG/1
2 CAPSULE ORAL 4 TIMES DAILY PRN
Qty: 30 CAPSULE | Refills: 1 | Status: SHIPPED | OUTPATIENT
Start: 2024-06-18 | End: 2024-07-03

## 2024-06-18 SDOH — ECONOMIC STABILITY: FOOD INSECURITY: WITHIN THE PAST 12 MONTHS, YOU WORRIED THAT YOUR FOOD WOULD RUN OUT BEFORE YOU GOT MONEY TO BUY MORE.: NEVER TRUE

## 2024-06-18 SDOH — ECONOMIC STABILITY: FOOD INSECURITY: WITHIN THE PAST 12 MONTHS, THE FOOD YOU BOUGHT JUST DIDN'T LAST AND YOU DIDN'T HAVE MONEY TO GET MORE.: NEVER TRUE

## 2024-06-18 SDOH — ECONOMIC STABILITY: INCOME INSECURITY: HOW HARD IS IT FOR YOU TO PAY FOR THE VERY BASICS LIKE FOOD, HOUSING, MEDICAL CARE, AND HEATING?: NOT HARD AT ALL

## 2024-06-18 ASSESSMENT — ENCOUNTER SYMPTOMS
NAUSEA: 0
ABDOMINAL DISTENTION: 0
VOMITING: 0
DIARRHEA: 1
BACK PAIN: 0
ABDOMINAL PAIN: 0
TROUBLE SWALLOWING: 0
SHORTNESS OF BREATH: 0
COUGH: 0
COLOR CHANGE: 0
CONSTIPATION: 0
WHEEZING: 0
SORE THROAT: 0
APNEA: 0
BLOOD IN STOOL: 0
SINUS PRESSURE: 0

## 2024-06-18 NOTE — PROGRESS NOTES
suicidal ideas. The patient is not nervous/anxious.           Objective   Patient-Reported Vitals  Patient-Reported Systolic (Top): 116 mmHg  Patient-Reported Diastolic (Bottom): 68 mmHg  Patient-Reported Temperature: 98  Patient-Reported Weight: 226  Patient-Reported Height: 5’9”       Physical Exam  [INSTRUCTIONS:  \"[x]\" Indicates a positive item  \"[]\" Indicates a negative item  -- DELETE ALL ITEMS NOT EXAMINED]    Constitutional: [x] Appears well-developed and well-nourished [x] No apparent distress      [] Abnormal -     Mental status: [x] Alert and awake  [x] Oriented to person/place/time [x] Able to follow commands    [] Abnormal -     Eyes:   EOM    [x]  Normal    [] Abnormal -   Sclera  [x]  Normal    [] Abnormal -          Discharge [x]  None visible   [] Abnormal -     HENT: [x] Normocephalic, atraumatic  [] Abnormal -   [] Mouth/Throat: Mucous membranes are moist    External Ears [x] Normal  [] Abnormal -    Neck: [x] No visualized mass [] Abnormal -     Pulmonary/Chest: [x] Respiratory effort normal   [x] No visualized signs of difficulty breathing or respiratory distress        [] Abnormal -      Musculoskeletal:   [x] Normal gait with no signs of ataxia         [x] Normal range of motion of neck        [] Abnormal -     Neurological:        [x] No Facial Asymmetry (Cranial nerve 7 motor function) (limited exam due to video visit)          [x] No gaze palsy        [] Abnormal -          Skin:        [x] No significant exanthematous lesions or discoloration noted on facial skin         [] Abnormal -            Psychiatric:       [x] Normal Affect [] Abnormal -        [x] No Hallucinations    Other pertinent observable physical exam findings:-             --MAGALY Mckeon - NP

## 2024-06-18 NOTE — PATIENT INSTRUCTIONS
Thank you for choosing [facility].    Please bring a current list of medications to every appointment.    Please contact your pharmacy for any prescription refill(s) that you are requesting.

## 2024-06-18 NOTE — ASSESSMENT & PLAN NOTE
Liberty continues to have diarrhea 5 days after initial symptoms without improvement. Based on symptoms and length, will treat with z pack. Continue to stay very well hydrated, eating small meals. Advised she can take immodium a few times a day if this is helpful. Call if symptoms do not improve or worsen.

## 2024-07-24 ENCOUNTER — OFFICE VISIT (OUTPATIENT)
Dept: FAMILY MEDICINE CLINIC | Age: 64
End: 2024-07-24
Payer: COMMERCIAL

## 2024-07-24 VITALS
HEIGHT: 69 IN | SYSTOLIC BLOOD PRESSURE: 118 MMHG | DIASTOLIC BLOOD PRESSURE: 80 MMHG | HEART RATE: 72 BPM | BODY MASS INDEX: 34.42 KG/M2 | TEMPERATURE: 98.4 F | WEIGHT: 232.4 LBS

## 2024-07-24 DIAGNOSIS — E78.5 ELEVATED LIPIDS: ICD-10-CM

## 2024-07-24 DIAGNOSIS — I10 ESSENTIAL HYPERTENSION: Primary | ICD-10-CM

## 2024-07-24 DIAGNOSIS — E89.0 POSTOPERATIVE HYPOTHYROIDISM: ICD-10-CM

## 2024-07-24 PROBLEM — I26.99 PULMONARY EMBOLUS (HCC): Status: RESOLVED | Noted: 2022-11-21 | Resolved: 2024-07-24

## 2024-07-24 PROCEDURE — 99214 OFFICE O/P EST MOD 30 MIN: CPT | Performed by: FAMILY MEDICINE

## 2024-07-24 PROCEDURE — 3074F SYST BP LT 130 MM HG: CPT | Performed by: FAMILY MEDICINE

## 2024-07-24 PROCEDURE — 3079F DIAST BP 80-89 MM HG: CPT | Performed by: FAMILY MEDICINE

## 2024-07-24 ASSESSMENT — ENCOUNTER SYMPTOMS
SHORTNESS OF BREATH: 0
COUGH: 0
ABDOMINAL PAIN: 0
DIARRHEA: 0
BLOOD IN STOOL: 0
CONSTIPATION: 0

## 2024-07-24 NOTE — PROGRESS NOTES
Subjective:      Patient ID: Liberty Crump is a 63 y.o. female.    Chief Complaint   Patient presents with    Check-Up      hypertension, lipids - pt is not fasting        Patient presents with:  Check-Up:  hypertension, lipids - pt is not fasting    Here for the above   She is well but she has some fatigue     She notes other wise well   Seeing [facility] now for the foot and is on iv meds for the infection    She is no longer seeing dr eder Sal same    YOB: 1960    Date of Visit:  7/24/2024     -- Sulfa Antibiotics -- Rash    Current Outpatient Medications:  ELIQUIS 5 MG TABS tablet, TAKE 1 TABLET BY MOUTH TWICE A DAY, Disp: 180 tablet, Rfl: 3  furosemide (LASIX) 20 MG tablet, TAKE 1 TABLET BY MOUTH DAILY, Disp: 30 tablet, Rfl: 5  levothyroxine (SYNTHROID) 150 MCG tablet, TAKE 1 TABLET BY MOUTH DAILY, Disp: 30 tablet, Rfl: 5  lisinopril-hydroCHLOROthiazide (PRINZIDE;ZESTORETIC) 20-25 MG per tablet, TAKE 1 TABLET BY MOUTH DAILY, Disp: 30 tablet, Rfl: 5  acetaminophen (TYLENOL) 500 MG tablet, Take 1 tablet by mouth every 6 hours as needed for Pain, Disp: 30 tablet, Rfl: 0  Fexofenadine HCl (ALLEGRA PO), Take 500 mg by mouth daily, Disp: , Rfl:   progesterone (PROMETRIUM) 200 MG CAPS capsule, Take 1 tablet by mouth daily, Disp: , Rfl:   lansoprazole (PREVACID) 15 MG delayed release capsule, Take 1 capsule by mouth daily, Disp: , Rfl:   Cyanocobalamin (VITAMIN B 12) 500 MCG TABS, Take 1 tablet by mouth daily, Disp: , Rfl:   Vitamin D (CHOLECALCIFEROL) 1000 UNITS CAPS capsule, Take 1 capsule by mouth daily, Disp: , Rfl:     No current facility-administered medications for this visit.      ----------------------------------                   07/24/24                             0836            ----------------------------------   BP:              118/80            Site:        Left Upper Arm        Position:        Sitting            Cuff Size:      Large Adult          Pulse:

## 2024-07-24 NOTE — PATIENT INSTRUCTIONS
Discuss the cbc especially the white blood count with dr cotto consider the RSV vaccine   Consider the pneumonia vaccine called Prevnar 20    Do the repeat labs in 3 months  See me in 6 months

## 2024-08-05 DIAGNOSIS — R60.9 SWELLING: ICD-10-CM

## 2024-08-05 RX ORDER — LISINOPRIL AND HYDROCHLOROTHIAZIDE 25; 20 MG/1; MG/1
1 TABLET ORAL DAILY
Qty: 30 TABLET | Refills: 5 | Status: SHIPPED | OUTPATIENT
Start: 2024-08-05

## 2024-08-05 RX ORDER — FUROSEMIDE 20 MG/1
20 TABLET ORAL DAILY
Qty: 30 TABLET | Refills: 5 | Status: SHIPPED | OUTPATIENT
Start: 2024-08-05

## 2024-08-05 RX ORDER — LEVOTHYROXINE SODIUM 0.15 MG/1
150 TABLET ORAL DAILY
Qty: 30 TABLET | Refills: 5 | Status: SHIPPED | OUTPATIENT
Start: 2024-08-05

## 2024-09-09 ENCOUNTER — HOSPITAL ENCOUNTER (OUTPATIENT)
Dept: WOMENS IMAGING | Age: 64
Discharge: HOME | End: 2024-09-09
Payer: COMMERCIAL

## 2024-09-09 VITALS — BODY MASS INDEX: 33.77 KG/M2 | HEIGHT: 69 IN | WEIGHT: 228 LBS

## 2024-09-09 DIAGNOSIS — Z12.31 VISIT FOR SCREENING MAMMOGRAM: ICD-10-CM

## 2024-09-09 PROCEDURE — 77063 BREAST TOMOSYNTHESIS BI: CPT

## 2024-10-02 ENCOUNTER — HOSPITAL ENCOUNTER (OUTPATIENT)
Dept: PHYSICAL THERAPY | Age: 64
Setting detail: THERAPIES SERIES
Discharge: HOME | End: 2024-10-02
Payer: COMMERCIAL

## 2024-10-02 DIAGNOSIS — Z78.9 DECREASED ACTIVITIES OF DAILY LIVING (ADL): ICD-10-CM

## 2024-10-02 DIAGNOSIS — M54.50 LUMBAR PAIN: Primary | ICD-10-CM

## 2024-10-02 PROCEDURE — 97110 THERAPEUTIC EXERCISES: CPT

## 2024-10-02 PROCEDURE — 97161 PT EVAL LOW COMPLEX 20 MIN: CPT

## 2024-10-02 PROCEDURE — 97140 MANUAL THERAPY 1/> REGIONS: CPT

## 2024-10-02 NOTE — PLAN OF CARE
[facility] [address] [place] [address] office: [phone] fax: [phone]     Physical Therapy Initial Evaluation Certification      Dear Dr. Catalino Restrepo,    We had the pleasure of evaluating the following patient for physical therapy services at [facility].  A summary of our findings can be found in the initial assessment below.  This includes our plan of care.  If you have any questions or concerns regarding these findings, please do not hesitate to contact me at the office phone number listed above.  Thank you for the referral.     Physician Signature:_______________________________Date:__________________  By signing above (or electronic signature), therapist’s plan is approved by physician       Physical Therapy: TREATMENT/PROGRESS NOTE   Patient: Liberty Crump (64 y.o. female)   Examination Date: 10/02/2024   :  1960 MRN: [id number]   Visit #: 1   Insurance Allowable Auth Needed   Allowed 90/yr []Yes    [x]No    Insurance: Payor: [employer] / Plan: [employer] - [place] PPO / Product Type: *No Product type* /   Insurance ID: [id number] - ([facility])  Secondary Insurance (if applicable):    Treatment Diagnosis:     ICD-10-CM    1. Lumbar pain  M54.50       2. Decreased activities of daily living (ADL)  Z78.9          Medical Diagnosis:  Low back pain, unspecified [M54.50]   Referring Physician: Catalino Restrepo MD  PCP: Cristian Banks MD     Plan of care signed (Y/N): N    Date of Patient follow up with Physician:      Plan of Care Report: EVAL today  POC update due: (10 visits /OR AUTH LIMITS, whichever is less)  2024                                             Medical History:  Comorbidities:  Hypertension  Other: HTN, OA, hypothyroidism, knee scopes L and R, pulmonary embolism  Relevant Medical History: HTN, hypothyroidism, OA, pulmonary embolism, L and R knee surgeries, thyroid surgery, lumbar surgery

## 2024-10-04 ENCOUNTER — HOSPITAL ENCOUNTER (OUTPATIENT)
Dept: PHYSICAL THERAPY | Age: 64
Setting detail: THERAPIES SERIES
Discharge: HOME | End: 2024-10-04
Payer: COMMERCIAL

## 2024-10-04 PROCEDURE — 97110 THERAPEUTIC EXERCISES: CPT

## 2024-10-04 PROCEDURE — G0283 ELEC STIM OTHER THAN WOUND: HCPCS

## 2024-10-04 PROCEDURE — 97140 MANUAL THERAPY 1/> REGIONS: CPT

## 2024-10-04 NOTE — PLAN OF CARE
[facility]- [facility] [address] [place] [address] office: [phone] fax: [phone]         Physical Therapy: TREATMENT/PROGRESS NOTE   Patient: Liberty Crump (64 y.o. female)   Examination Date: 10/04/2024   :  1960 MRN: [id number]   Visit #: 2   Insurance Allowable Auth Needed   Allowed 90/yr []Yes    [x]No    Insurance: Payor: BCBS / Plan: [employer] - [place] PPO / Product Type: *No Product type* /   Insurance ID: [id number] - ([place] [employer])  Secondary Insurance (if applicable):    Treatment Diagnosis:     ICD-10-CM    1. Lumbar pain  M54.50       2. Decreased activities of daily living (ADL)  Z78.9          Medical Diagnosis:  Low back pain, unspecified [M54.50]   Referring Physician: Catalino Restrepo MD  PCP: Cristian Banks MD     Plan of care signed (Y/N): N    Date of Patient follow up with Physician:      Plan of Care Report: no  POC update due: (10 visits /OR AUTH LIMITS, whichever is less)  2024                                             Medical History:  Comorbidities:  Hypertension  Other: HTN, OA, hypothyroidism, knee scopes L and R, pulmonary embolism  Relevant Medical History: HTN, hypothyroidism, OA, pulmonary embolism, L and R knee surgeries, thyroid surgery, lumbar surgery                                          Precautions/ Contra-indications:           Latex allergy:  NO  Pacemaker:    NO  Contraindications for Manipulation: None  Date of Surgery: NA  Other:    Red Flags:  None    Suicide Screening:   The patient did not verbalize a primary behavioral concern, suicidal ideation, suicidal intent, or demonstrate suicidal behaviors.    Preferred Language for Healthcare:   [x] English       [] other:    SUBJECTIVE EXAMINATION     Patient stated complaint: L stated she has had L low back pain.  Pt travels a lot for work, and she \"tweaked\" her back while traveling in April. Pt had x-rays that \"showed some bone spurs and arthritis.\"  Pt did not

## 2024-10-09 ENCOUNTER — HOSPITAL ENCOUNTER (OUTPATIENT)
Dept: PHYSICAL THERAPY | Age: 64
Setting detail: THERAPIES SERIES
Discharge: HOME | End: 2024-10-09
Payer: COMMERCIAL

## 2024-10-09 ENCOUNTER — APPOINTMENT (OUTPATIENT)
Dept: PHYSICAL THERAPY | Age: 64
End: 2024-10-09
Payer: COMMERCIAL

## 2024-10-09 PROCEDURE — 97140 MANUAL THERAPY 1/> REGIONS: CPT

## 2024-10-09 PROCEDURE — 97112 NEUROMUSCULAR REEDUCATION: CPT

## 2024-10-09 PROCEDURE — 97110 THERAPEUTIC EXERCISES: CPT

## 2024-10-09 NOTE — PLAN OF CARE
to WFL without pain to allow for proper joint functioning to enable patient to don her shoes.   [] Progressing: [] Met: [] Not Met: [] Adjusted  3. Patient will demonstrate increased Strength of L hip  to at least 4/5 throughout without pain to allow for proper functional mobility to enable patient to return to get in and out of car.   [] Progressing: [] Met: [] Not Met: [] Adjusted  4. Patient will return to sleep without increased symptoms or restriction.   [] Progressing: [] Met: [] Not Met: [] Adjusted  5. Patient will be able to bend to clean her tub without pain.  [] Progressing: [] Met: [] Not Met: [] Adjusted     Overall Progression Towards Functional goals/ Treatment Progress Update:  [] Patient is progressing as expected towards functional goals listed.    [] Progression is slowed due to complexities/Impairments listed.  [] Progression has been slowed due to co-morbidities.  [x] Plan just implemented, too soon (<30days) to assess goals progression   [] Goals require adjustment due to lack of progress  [] Patient is not progressing as expected and requires additional follow up with physician  [] Other:     TREATMENT PLAN     Frequency/Duration: 1-2x/week for 4-6 weeks for the following treatment interventions:    Interventions:  Therapeutic Exercise (30496) including: strength training, ROM, and functional mobility  Therapeutic Activities (89823) including: functional mobility training and education.  Neuromuscular Re-education (07772) activation and proprioception, including postural re-education.    Manual Therapy (71652) as indicated to include: Passive Range of Motion, Gr I-IV mobilizations, Grade V Manipulation, and Soft Tissue Mobilization    Plan: POC initiated as per evaluation. Begin STM L lumbar, assess L iliopsoas.  Progress lumbopelvic ab stab program.  Avoid DN  Progress ab stab in prone, hook lying, standing    Electronically Signed by Grady Zepeda PT  Date: 10/09/2024     Note: Portions of

## 2024-10-11 ENCOUNTER — HOSPITAL ENCOUNTER (OUTPATIENT)
Dept: PHYSICAL THERAPY | Age: 64
Setting detail: THERAPIES SERIES
Discharge: HOME | End: 2024-10-11
Payer: COMMERCIAL

## 2024-10-11 PROCEDURE — 97140 MANUAL THERAPY 1/> REGIONS: CPT

## 2024-10-11 PROCEDURE — G0283 ELEC STIM OTHER THAN WOUND: HCPCS

## 2024-10-11 PROCEDURE — 97110 THERAPEUTIC EXERCISES: CPT

## 2024-10-11 PROCEDURE — 97112 NEUROMUSCULAR REEDUCATION: CPT

## 2024-10-11 NOTE — PLAN OF CARE
[facility]- [facility] [address] [place] [address] office: [phone] fax: [phone]         Physical Therapy: TREATMENT/PROGRESS NOTE   Patient: Liberty Crump (64 y.o. female)   Examination Date: 10/11/2024   :  1960 MRN: [id number]   Visit #: 4   Insurance Allowable Auth Needed   Allowed 90/yr []Yes    [x]No    Insurance: Payor: BCBS / Plan: [employer] [place] PPO / Product Type: *No Product type* /   Insurance ID: [id number] - ([place] [employer])  Secondary Insurance (if applicable):    Treatment Diagnosis:     ICD-10-CM    1. Lumbar pain  M54.50       2. Decreased activities of daily living (ADL)  Z78.9          Medical Diagnosis:  Low back pain, unspecified [M54.50]   Referring Physician: Catalino Restrepo MD  PCP: Cristian Banks MD     Plan of care signed (Y/N): N    Date of Patient follow up with Physician:      Plan of Care Report: no  POC update due: (10 visits /OR AUTH LIMITS, whichever is less)  2024                                             Medical History:  Comorbidities:  Hypertension  Other: HTN, OA, hypothyroidism, knee scopes L and R, pulmonary embolism  Relevant Medical History: HTN, hypothyroidism, OA, pulmonary embolism, L and R knee surgeries, thyroid surgery, lumbar surgery                                          Precautions/ Contra-indications:           Latex allergy:  NO  Pacemaker:    NO  Contraindications for Manipulation: None  Date of Surgery: NA  Other:    Red Flags:  None    Suicide Screening:   The patient did not verbalize a primary behavioral concern, suicidal ideation, suicidal intent, or demonstrate suicidal behaviors.    Preferred Language for Healthcare:   [x] English       [] other:    SUBJECTIVE EXAMINATION     Patient stated complaint: L stated she has had L low back pain.  Pt travels a lot for work, and she \"tweaked\" her back while traveling in April. Pt had x-rays that \"showed some bone spurs and arthritis.\"  Pt did not

## 2024-10-21 ENCOUNTER — APPOINTMENT (OUTPATIENT)
Dept: PHYSICAL THERAPY | Age: 64
End: 2024-10-21
Payer: COMMERCIAL

## 2024-10-21 ENCOUNTER — HOSPITAL ENCOUNTER (OUTPATIENT)
Dept: PHYSICAL THERAPY | Age: 64
Setting detail: THERAPIES SERIES
Discharge: HOME | End: 2024-10-21
Payer: COMMERCIAL

## 2024-10-21 PROCEDURE — 97112 NEUROMUSCULAR REEDUCATION: CPT

## 2024-10-21 PROCEDURE — G0283 ELEC STIM OTHER THAN WOUND: HCPCS

## 2024-10-21 PROCEDURE — 97110 THERAPEUTIC EXERCISES: CPT

## 2024-10-21 PROCEDURE — 97140 MANUAL THERAPY 1/> REGIONS: CPT

## 2024-10-21 NOTE — FLOWSHEET NOTE
[facility]- [facility] Therapy [address] [place] [address] office: [phone] fax: [phone]         Physical Therapy: TREATMENT/PROGRESS NOTE   Patient: Liberty Crump (64 y.o. female)   Examination Date: 10/21/2024   :  1960 MRN: [id number]   Visit #: 5   Insurance Allowable Auth Needed   Allowed 90/yr []Yes    [x]No    Insurance: Payor: BCBS / Plan: [employer] [place] PPO / Product Type: *No Product type* /   Insurance ID: [id number] - ([place] [employer])  Secondary Insurance (if applicable):    Treatment Diagnosis:     ICD-10-CM    1. Lumbar pain  M54.50       2. Decreased activities of daily living (ADL)  Z78.9          Medical Diagnosis:  Low back pain, unspecified [M54.50]   Referring Physician: Catalino Restrepo MD  PCP: Cristian Banks MD     Plan of care signed (Y/N): N    Date of Patient follow up with Physician:      Plan of Care Report: no  POC update due: (10 visits /OR AUTH LIMITS, whichever is less)  2024                                             Medical History:  Comorbidities:  Hypertension  Other: HTN, OA, hypothyroidism, knee scopes L and R, pulmonary embolism  Relevant Medical History: HTN, hypothyroidism, OA, pulmonary embolism, L and R knee surgeries, thyroid surgery, lumbar surgery                                          Precautions/ Contra-indications:           Latex allergy:  NO  Pacemaker:    NO  Contraindications for Manipulation: None  Date of Surgery: NA  Other:    Red Flags:  None    Suicide Screening:   The patient did not verbalize a primary behavioral concern, suicidal ideation, suicidal intent, or demonstrate suicidal behaviors.    Preferred Language for Healthcare:   [x] English       [] other:    SUBJECTIVE EXAMINATION     Patient stated complaint: L stated she has had L low back pain.  Pt travels a lot for work, and she \"tweaked\" her back while traveling in April. Pt had x-rays that \"showed some bone spurs and arthritis.\"  Pt did not

## 2024-10-23 ENCOUNTER — HOSPITAL ENCOUNTER (OUTPATIENT)
Dept: PHYSICAL THERAPY | Age: 64
Setting detail: THERAPIES SERIES
Discharge: HOME | End: 2024-10-23
Payer: COMMERCIAL

## 2024-10-23 PROCEDURE — 97112 NEUROMUSCULAR REEDUCATION: CPT

## 2024-10-23 PROCEDURE — 97140 MANUAL THERAPY 1/> REGIONS: CPT

## 2024-10-23 PROCEDURE — 97110 THERAPEUTIC EXERCISES: CPT

## 2024-10-23 NOTE — FLOWSHEET NOTE
and would benefit from continued outpatient therapy services to address the deficits outlined in the patients goals    Return to Play: NA    Prognosis for POC: [x] Good [] Fair  [] Poor    Patient requires continued skilled intervention: [x] Yes  [] No      CHARGE CAPTURE     PT CHARGE GRID   CPT Code (TIMED) # CPT Code (UNTIMED) #     Therex (22337)  1  EVAL:LOW (32711 - Typically 20 minutes face-to-face)     Neuromusc. Re-ed (96802) 1  Re-Eval (79247)     Manual (44180) 1  Estim Unattended (20536)     Ther. Act (09411)   Mech. Traction (43659)     Gait (37863)   Dry Needle 1-2 muscle (51583)     Aquatic Therex (29677)   Dry Needle 3+ muscle (20561)     Iontophoresis (15539)   VASO (57386)     Ultrasound (20380)   Group Therapy (59649)     Estim Attended (12453)   Canalith Repositioning (01314)     Other:   Other:    Total 3       Total Timed Code Tx Minutes 45   Total Treatment Minutes 45        Charge Justification:  (71979) THERAPEUTIC EXERCISE - Provided verbal/tactile cueing for activities related to strengthening, flexibility, endurance, ROM performed to prevent loss of range of motion, maintain or improve muscular strength or increase flexibility, following either an injury or surgery.   (19416) NEUROMUSCULAR RE-EDUCATION - Therapeutic procedure, 1 or more areas, each 15 minutes; neuromuscular reeducation of movement, balance, coordination, kinesthetic sense, posture, and/or proprioception for sitting and/or standing activities  (74097) MANUAL THERAPY -  Manual therapy techniques, 1 or more regions, each 15 minutes (Mobilization/manipulation, manual lymphatic drainage, manual traction) for the purpose of modulating pain, promoting relaxation,  increasing ROM, reducing/eliminating soft tissue swelling/inflammation/restriction, improving soft tissue extensibility and allowing for proper ROM for normal function with self care, mobility, lifting and ambulation    GOALS     Patient stated goal: \"no pain\", getting

## 2024-10-25 NOTE — PROGRESS NOTES
Subjective:      Patient ID: Mikey Love is a 61 y.o. female.     Patient presents with:  Pre-op Exam: pre op for left knee medial menisectomy, dr Mariela Resendez,  need potassium, EKG, 3-10-20, [facility], fax [phone]    Mikey Love is a 61 y.o. female with the following history as recorded in [employer]:  Patient Active Problem List    Acute non-recurrent frontal sinusitis         Date Noted: 07/29/2016      Cellulitis of finger of right hand         Date Noted: 07/29/2016      MELISSA (obstructive sleep apnea)         Date Noted: 12/17/2015      HLD (hyperlipidemia)         Date Noted: 09/24/2015      Bilateral leg edema         Date Noted: 09/24/2015      Chronic venous hypertension with inflammation involving both sides         Date Noted: 01/30/2015      Chronic venous insufficiency         Date Noted: 01/30/2015      Swelling         Date Noted: 01/30/2015      Family history of colonic polyps         Date Noted: 10/22/2014      Menorrhagia         Date Noted: 11/07/2011      Unilateral complete paralysis of vocal cord         Date Noted: 09/01/2011      Dyspnea and respiratory abnormality         Date Noted: 09/01/2011      Thyroid mass      GERD (gastroesophageal reflux disease)      Hypothyroidism      Allergic rhinitis      Hypertension      Dysuria         Date Noted: 07/20/2010      Atopic rhinitis         Date Noted: 02/04/2010      Skin disorder         Date Noted: 07/13/2009      Rash         Date Noted: 06/09/2009      Disorder of function of stomach         Date Noted: 04/22/2009      Benign neoplasm of thyroid gland         Date Noted: 08/14/2007      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
Spontaneous, unlabored and symmetrical

## 2024-10-28 ENCOUNTER — APPOINTMENT (OUTPATIENT)
Dept: PHYSICAL THERAPY | Age: 64
End: 2024-10-28
Payer: COMMERCIAL

## 2024-10-30 ENCOUNTER — APPOINTMENT (OUTPATIENT)
Dept: PHYSICAL THERAPY | Age: 64
End: 2024-10-30
Payer: COMMERCIAL

## 2024-11-20 DIAGNOSIS — E89.0 POSTOPERATIVE HYPOTHYROIDISM: ICD-10-CM

## 2024-11-20 DIAGNOSIS — E78.5 ELEVATED LIPIDS: ICD-10-CM

## 2024-11-20 DIAGNOSIS — I10 ESSENTIAL HYPERTENSION: ICD-10-CM

## 2024-11-20 LAB
CHOLEST SERPL-MCNC: 224 MG/DL (ref 0–199)
HDLC SERPL-MCNC: 53 MG/DL (ref 40–60)
LDLC SERPL CALC-MCNC: 144 MG/DL
T4 FREE SERPL-MCNC: 1.7 NG/DL (ref 0.9–1.8)
TRIGL SERPL-MCNC: 137 MG/DL (ref 0–150)
TSH SERPL DL<=0.005 MIU/L-ACNC: 0.81 UIU/ML (ref 0.27–4.2)
VLDLC SERPL CALC-MCNC: 27 MG/DL

## 2024-12-18 ENCOUNTER — OFFICE VISIT (OUTPATIENT)
Dept: CARDIOLOGY CLINIC | Age: 64
End: 2024-12-18
Payer: COMMERCIAL

## 2024-12-18 VITALS
BODY MASS INDEX: 34.21 KG/M2 | HEART RATE: 71 BPM | HEIGHT: 69 IN | DIASTOLIC BLOOD PRESSURE: 78 MMHG | WEIGHT: 231 LBS | OXYGEN SATURATION: 97 % | SYSTOLIC BLOOD PRESSURE: 138 MMHG

## 2024-12-18 DIAGNOSIS — I10 ESSENTIAL HYPERTENSION: ICD-10-CM

## 2024-12-18 DIAGNOSIS — Z86.711 HISTORY OF PULMONARY EMBOLUS (PE): Primary | ICD-10-CM

## 2024-12-18 PROCEDURE — 99214 OFFICE O/P EST MOD 30 MIN: CPT | Performed by: INTERNAL MEDICINE

## 2024-12-18 PROCEDURE — 3078F DIAST BP <80 MM HG: CPT | Performed by: INTERNAL MEDICINE

## 2024-12-18 PROCEDURE — 93000 ELECTROCARDIOGRAM COMPLETE: CPT | Performed by: INTERNAL MEDICINE

## 2024-12-18 PROCEDURE — 3075F SYST BP GE 130 - 139MM HG: CPT | Performed by: INTERNAL MEDICINE

## 2024-12-18 NOTE — PROGRESS NOTES
[facility]    Liberty Crump  1960 December 18, 2024    Reason for Consult: Pulmonary Embolus    CC: \" I have been traveling the world\"    HPI:  The patient is 64 y.o. female with a past medical history significant for essential hypertension and pulmonary embolism. who presented to [facility] ED with shortness of breath. She had a lumbar discectomy on 11/10/22 done by Dr. Restrepo and developed a PE. She was on a heparin drip while in the hospital. 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  We last saw her in December 2023    Today, she presents for follow up and overall she is feeling well.  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  She is on lifelong Eliquis. She reports medication compliance and is tolerating. She denies any abnormal bleeding or bruising. She denies exertional chest pain/pressure, dyspnea at rest, worsening ANTUNEZ, PND, orthopnea, palpitations, lightheadedness, weight changes, changes in LE edema, and syncope.     Review of Systems:  Constitutional: No fatigue, weakness, night sweats or fever.   HEENT: No new vision difficulties or ringing in the ears.  Respiratory: No new SOB, PND, orthopnea or cough.   Cardiovascular: See HPI   GI: No n/v, diarrhea, constipation, abdominal pain or changes in bowel habits.  No melena, no hematochezia  : No urinary frequency, urgency, incontinence, hematuria or dysuria.  Skin: No cyanosis or skin lesions.  Musculoskeletal: No new muscle or joint pain.  Neurological: No syncope or TIA-like symptoms.  Psychiatric: No anxiety, insomnia or depression     Past Medical History:   Diagnosis Date    Allergic rhinitis     Chronic allergic rhinitis     Dyspepsia     Hypertension     Hypothyroidism     Osteoarthritis 2020    Knees    Pulmonary embolism (HCC) Nov 2022    Sleep apnea 7 years     Past Surgical History:   Procedure Laterality Date    COLONOSCOPY  2009    COLONOSCOPY

## 2024-12-23 ENCOUNTER — OFFICE VISIT (OUTPATIENT)
Dept: FAMILY MEDICINE CLINIC | Age: 64
End: 2024-12-23

## 2024-12-23 VITALS
HEIGHT: 69 IN | DIASTOLIC BLOOD PRESSURE: 80 MMHG | HEART RATE: 68 BPM | SYSTOLIC BLOOD PRESSURE: 118 MMHG | BODY MASS INDEX: 34.24 KG/M2 | WEIGHT: 231.2 LBS | TEMPERATURE: 97.3 F

## 2024-12-23 DIAGNOSIS — R63.5 WEIGHT GAIN: ICD-10-CM

## 2024-12-23 DIAGNOSIS — E78.49 OTHER HYPERLIPIDEMIA: ICD-10-CM

## 2024-12-23 DIAGNOSIS — I10 ESSENTIAL HYPERTENSION: Primary | ICD-10-CM

## 2024-12-23 DIAGNOSIS — E89.0 POSTOPERATIVE HYPOTHYROIDISM: ICD-10-CM

## 2024-12-23 ASSESSMENT — ENCOUNTER SYMPTOMS
CONSTIPATION: 0
ABDOMINAL PAIN: 0
ROS SKIN COMMENTS: NO LESIONS
BLOOD IN STOOL: 0
DIARRHEA: 0
SHORTNESS OF BREATH: 0

## 2024-12-23 NOTE — PROGRESS NOTES
Subjective:      Patient ID: Liberty Crump is a 64 y.o. female.    Chief Complaint   Patient presents with    Follow-up     Hypertension, thyroid, lipids - discuss lab results        Patient presents with:  Follow-up: Hypertension, thyroid, lipids - discuss lab results    She is here for the above   She is wanting to lose weight    She has knee pain that interferes with the activity increase   She does see [facility]    No thyroid ca in family  No thyroid ca in self    No hx of pancreatitis    YOB: 1960    Date of Visit:  12/23/2024     -- Sulfa Antibiotics -- Rash    Current Outpatient Medications:  lisinopril-hydroCHLOROthiazide (PRINZIDE;ZESTORETIC) 20-25 MG per tablet, TAKE 1 TABLET BY MOUTH DAILY, Disp: 30 tablet, Rfl: 5  levothyroxine (SYNTHROID) 150 MCG tablet, TAKE 1 TABLET BY MOUTH DAILY, Disp: 30 tablet, Rfl: 5  furosemide (LASIX) 20 MG tablet, TAKE 1 TABLET BY MOUTH DAILY (Patient taking differently: Take 1 tablet by mouth daily PRN), Disp: 30 tablet, Rfl: 5  ELIQUIS 5 MG TABS tablet, TAKE 1 TABLET BY MOUTH TWICE A DAY (Patient taking differently: Take 0.5 tablets by mouth 2 times daily), Disp: 180 tablet, Rfl: 3  acetaminophen (TYLENOL) 500 MG tablet, Take 1 tablet by mouth every 6 hours as needed for Pain, Disp: 30 tablet, Rfl: 0  Fexofenadine HCl (ALLEGRA PO), Take 500 mg by mouth daily, Disp: , Rfl:   progesterone (PROMETRIUM) 200 MG CAPS capsule, Take 1 tablet by mouth daily, Disp: , Rfl:   lansoprazole (PREVACID) 15 MG delayed release capsule, Take 1 capsule by mouth daily, Disp: , Rfl:   Cyanocobalamin (VITAMIN B 12) 500 MCG TABS, Take 1 tablet by mouth daily, Disp: , Rfl:   Vitamin D (CHOLECALCIFEROL) 1000 UNITS CAPS capsule, Take 1 capsule by mouth daily, Disp: , Rfl:     No current facility-administered medications for this visit.      ----------------------------------                   12/23/24                             [id number]

## 2024-12-27 ENCOUNTER — TELEPHONE (OUTPATIENT)
Dept: FAMILY MEDICINE CLINIC | Age: 64
End: 2024-12-27

## 2024-12-27 DIAGNOSIS — G47.33 OSA ON CPAP: ICD-10-CM

## 2024-12-27 NOTE — TELEPHONE ENCOUNTER
Pre Auth  (Newest Message First)  View All Conversations on this Encounter  Liberty Morales [facility] Staff (supporting Cristian Banks MD)6 minutes ago (8:54 AM)     Hello, the Wegovy prescription I received on my Monday visit requires pre authorization by my insurance company.  Rita sent this to Dr Banks.  Please confirm if this was received and processed? Thank you , Liberty Crump

## 2025-01-26 DIAGNOSIS — R60.9 SWELLING: ICD-10-CM

## 2025-01-27 RX ORDER — LEVOTHYROXINE SODIUM 150 UG/1
150 TABLET ORAL DAILY
Qty: 90 TABLET | Refills: 1 | Status: SHIPPED | OUTPATIENT
Start: 2025-01-27

## 2025-01-27 RX ORDER — FUROSEMIDE 20 MG/1
20 TABLET ORAL DAILY
Qty: 90 TABLET | Refills: 0 | Status: SHIPPED | OUTPATIENT
Start: 2025-01-27

## 2025-01-27 RX ORDER — LISINOPRIL AND HYDROCHLOROTHIAZIDE 20; 25 MG/1; MG/1
1 TABLET ORAL DAILY
Qty: 90 TABLET | Refills: 1 | Status: SHIPPED | OUTPATIENT
Start: 2025-01-27

## 2025-03-05 ENCOUNTER — OFFICE VISIT (OUTPATIENT)
Dept: FAMILY MEDICINE CLINIC | Age: 65
End: 2025-03-05

## 2025-03-05 VITALS
WEIGHT: 224.2 LBS | TEMPERATURE: 98.1 F | SYSTOLIC BLOOD PRESSURE: 106 MMHG | HEART RATE: 67 BPM | DIASTOLIC BLOOD PRESSURE: 60 MMHG | HEIGHT: 69 IN | OXYGEN SATURATION: 97 % | BODY MASS INDEX: 33.21 KG/M2

## 2025-03-05 DIAGNOSIS — R53.83 OTHER FATIGUE: ICD-10-CM

## 2025-03-05 DIAGNOSIS — R53.83 OTHER FATIGUE: Primary | ICD-10-CM

## 2025-03-05 DIAGNOSIS — E55.9 VITAMIN D INSUFFICIENCY: ICD-10-CM

## 2025-03-05 DIAGNOSIS — M10.9 GOUTY ARTHRITIS OF LEFT GREAT TOE: ICD-10-CM

## 2025-03-05 LAB
25(OH)D3 SERPL-MCNC: 38.9 NG/ML
DEPRECATED RDW RBC AUTO: 13.3 % (ref 12.4–15.4)
HCT VFR BLD AUTO: 48.2 % (ref 36–48)
HGB BLD-MCNC: 16.1 G/DL (ref 12–16)
MCH RBC QN AUTO: 32 PG (ref 26–34)
MCHC RBC AUTO-ENTMCNC: 33.3 G/DL (ref 31–36)
MCV RBC AUTO: 96.1 FL (ref 80–100)
PLATELET # BLD AUTO: 371 K/UL (ref 135–450)
PMV BLD AUTO: 8.1 FL (ref 5–10.5)
RBC # BLD AUTO: 5.02 M/UL (ref 4–5.2)
TSH SERPL DL<=0.005 MIU/L-ACNC: 0.16 UIU/ML (ref 0.27–4.2)
URATE SERPL-MCNC: 7.6 MG/DL (ref 2.6–6)
WBC # BLD AUTO: 14.4 K/UL (ref 4–11)

## 2025-03-05 RX ORDER — COLCHICINE 0.6 MG/1
TABLET ORAL
Qty: 30 TABLET | Refills: 1 | Status: SHIPPED | OUTPATIENT
Start: 2025-03-05

## 2025-03-05 SDOH — ECONOMIC STABILITY: FOOD INSECURITY: WITHIN THE PAST 12 MONTHS, THE FOOD YOU BOUGHT JUST DIDN'T LAST AND YOU DIDN'T HAVE MONEY TO GET MORE.: NEVER TRUE

## 2025-03-05 SDOH — ECONOMIC STABILITY: FOOD INSECURITY: WITHIN THE PAST 12 MONTHS, YOU WORRIED THAT YOUR FOOD WOULD RUN OUT BEFORE YOU GOT MONEY TO BUY MORE.: NEVER TRUE

## 2025-03-05 ASSESSMENT — ENCOUNTER SYMPTOMS
SHORTNESS OF BREATH: 0
COUGH: 0

## 2025-03-05 ASSESSMENT — PATIENT HEALTH QUESTIONNAIRE - PHQ9
SUM OF ALL RESPONSES TO PHQ QUESTIONS 1-9: 0
SUM OF ALL RESPONSES TO PHQ QUESTIONS 1-9: 0
1. LITTLE INTEREST OR PLEASURE IN DOING THINGS: NOT AT ALL
SUM OF ALL RESPONSES TO PHQ QUESTIONS 1-9: 0
SUM OF ALL RESPONSES TO PHQ QUESTIONS 1-9: 0
2. FEELING DOWN, DEPRESSED OR HOPELESS: NOT AT ALL

## 2025-03-05 NOTE — PROGRESS NOTES
Liberty Crump (:  1960) is a 64 y.o. female,Established patient, here for evaluation of the following chief complaint(s):  Gout (Left big toe flare up )      Assessment & Plan   ASSESSMENT/PLAN:  1. Other fatigue  Worsening. Pt requested to have labs check  - CBC; Future  - TSH; Future    2. Gouty arthritis of left great toe  New. Medication as discussed, follow pt education and get labs.   - colchicine (COLCRYS) 0.6 MG tablet; Take 2 tabs day one, may repeat 1 tab as needed 1-2 hrs after first dose (max dose day one 1.8mg), then take 1 tab daily until gout resolves.  Dispense: 30 tablet; Refill: 1  - Uric Acid; Future    3. Vitamin D insufficiency  Stable. Hx of check labs may be adding to fatigue.   - Vitamin D 25 Hydroxy; Future       Return if symptoms worsen or fail to improve.         Subjective   SUBJECTIVE/OBJECTIVE:  HPI  Presents today for gout flare and fatigue.   Gout flare of left great toe started yesterday. States she has had a few flare ups over the years. Gout started back in  after knee surgery.   Worsening fatigue, follows with Dr. De La Cruz for blood  disorders. States she gets good sleep but feels tired, requested labs to be checked.     Current Outpatient Medications   Medication Sig Dispense Refill    colchicine (COLCRYS) 0.6 MG tablet Take 2 tabs day one, may repeat 1 tab as needed 1-2 hrs after first dose (max dose day one 1.8mg), then take 1 tab daily until gout resolves. 30 tablet 1    Semaglutide-Weight Management (WEGOVY) 0.5 MG/0.5ML SOAJ SC injection Inject 0.5 mg into the skin every 7 days 2 mL 1    lisinopril-hydroCHLOROthiazide (PRINZIDE;ZESTORETIC) 20-25 MG per tablet TAKE 1 TABLET BY MOUTH DAILY 90 tablet 1    furosemide (LASIX) 20 MG tablet Take 1 tablet by mouth daily PRN 90 tablet 0    levothyroxine (SYNTHROID) 150 MCG tablet TAKE 1 TABLET BY MOUTH DAILY 90 tablet 1    ELIQUIS 5 MG TABS tablet TAKE 1 TABLET BY MOUTH TWICE A DAY (Patient taking differently: Take

## 2025-03-06 ENCOUNTER — TELEPHONE (OUTPATIENT)
Dept: FAMILY MEDICINE CLINIC | Age: 65
End: 2025-03-06

## 2025-03-06 RX ORDER — ALLOPURINOL 100 MG/1
100 TABLET ORAL DAILY
Qty: 90 TABLET | Refills: 1 | Status: SHIPPED | OUTPATIENT
Start: 2025-03-06

## 2025-03-06 RX ORDER — LEVOTHYROXINE SODIUM 125 UG/1
125 TABLET ORAL DAILY
Qty: 90 TABLET | Refills: 0 | Status: SHIPPED | OUTPATIENT
Start: 2025-03-06

## 2025-03-06 NOTE — TELEPHONE ENCOUNTER
New meds sent    Orders Placed This Encounter   Medications    levothyroxine (SYNTHROID) 125 MCG tablet     Sig: Take 1 tablet by mouth daily     Dispense:  90 tablet     Refill:  0     New dose    allopurinol (ZYLOPRIM) 100 MG tablet     Sig: Take 1 tablet by mouth daily     Dispense:  90 tablet     Refill:  1

## 2025-03-06 NOTE — TELEPHONE ENCOUNTER
Hi. I saw my test results and they were not normal and that would explain why I’m not feeling well and exhausted. Please let me know what my next steps would be. Thank you. Liberty Crump

## 2025-03-06 NOTE — TELEPHONE ENCOUNTER
Needs to reduce the thyroid dose  She will need to follow back up with her blood doctor  Dr cotto   I am going to also start medicine for the uric acid   See in april please

## 2025-04-07 ENCOUNTER — PATIENT MESSAGE (OUTPATIENT)
Dept: FAMILY MEDICINE CLINIC | Age: 65
End: 2025-04-07

## 2025-04-07 NOTE — TELEPHONE ENCOUNTER
Patient is positive for covid, she has fever 101,  body aches, productive cough, feels terrible , has been taking mucinex.      Please advise,

## 2025-04-07 NOTE — TELEPHONE ENCOUNTER
She is on eliquis and the medicine we use, paxlovid, could cause serious concerns. We can not use   Fluids  Rest otc cold remedies   If problem breathing or not able to take fluids  Go to ER to be evaluated

## 2025-04-21 ENCOUNTER — OFFICE VISIT (OUTPATIENT)
Dept: FAMILY MEDICINE CLINIC | Age: 65
End: 2025-04-21
Payer: COMMERCIAL

## 2025-04-21 VITALS
WEIGHT: 222 LBS | TEMPERATURE: 97.4 F | HEIGHT: 69 IN | DIASTOLIC BLOOD PRESSURE: 70 MMHG | HEART RATE: 80 BPM | BODY MASS INDEX: 32.88 KG/M2 | SYSTOLIC BLOOD PRESSURE: 118 MMHG

## 2025-04-21 DIAGNOSIS — Z11.59 ENCOUNTER FOR HEPATITIS C SCREENING TEST FOR LOW RISK PATIENT: ICD-10-CM

## 2025-04-21 DIAGNOSIS — E89.0 POSTOPERATIVE HYPOTHYROIDISM: Primary | ICD-10-CM

## 2025-04-21 DIAGNOSIS — Z11.4 ENCOUNTER FOR SCREENING FOR HIV: ICD-10-CM

## 2025-04-21 PROCEDURE — 3074F SYST BP LT 130 MM HG: CPT | Performed by: FAMILY MEDICINE

## 2025-04-21 PROCEDURE — 99213 OFFICE O/P EST LOW 20 MIN: CPT | Performed by: FAMILY MEDICINE

## 2025-04-21 PROCEDURE — 3078F DIAST BP <80 MM HG: CPT | Performed by: FAMILY MEDICINE

## 2025-04-21 RX ORDER — PROMETHAZINE HYDROCHLORIDE 25 MG/1
25 TABLET ORAL EVERY 8 HOURS PRN
Qty: 21 TABLET | Refills: 0 | Status: SHIPPED | OUTPATIENT
Start: 2025-04-21 | End: 2025-04-28

## 2025-04-21 NOTE — PROGRESS NOTES
Subjective:      Patient ID: Liberty Crump is a 64 y.o. female.    Chief Complaint   Patient presents with    Check-Up     Hypertension, thyroid, lipids        Patient presents with:  Check-Up: Hypertension, thyroid, lipids    Here for the above   Really quite well   No c/o  Medicine causes na  No pain and did try old phenergan and worked  Wanted to have on hand  No other abd sx     YOB: 1960    Date of Visit:  4/21/2025     -- Sulfa Antibiotics -- Rash    Current Outpatient Medications:  Semaglutide-Weight Management (WEGOVY) 1 MG/0.5ML SOAJ SC injection, Inject 1 mg into the skin once a week, Disp: 2 mL, Rfl: 1  levothyroxine (SYNTHROID) 125 MCG tablet, Take 1 tablet by mouth daily, Disp: 90 tablet, Rfl: 0  allopurinol (ZYLOPRIM) 100 MG tablet, Take 1 tablet by mouth daily, Disp: 90 tablet, Rfl: 1  lisinopril-hydroCHLOROthiazide (PRINZIDE;ZESTORETIC) 20-25 MG per tablet, TAKE 1 TABLET BY MOUTH DAILY, Disp: 90 tablet, Rfl: 1  ELIQUIS 5 MG TABS tablet, TAKE 1 TABLET BY MOUTH TWICE A DAY (Patient taking differently: Take 0.5 tablets by mouth 2 times daily), Disp: 180 tablet, Rfl: 3  acetaminophen (TYLENOL) 500 MG tablet, Take 1 tablet by mouth every 6 hours as needed for Pain, Disp: 30 tablet, Rfl: 0  Fexofenadine HCl (ALLEGRA PO), Take 500 mg by mouth daily, Disp: , Rfl:   progesterone (PROMETRIUM) 200 MG CAPS capsule, Take 1 tablet by mouth daily, Disp: , Rfl:   lansoprazole (PREVACID) 15 MG delayed release capsule, Take 1 capsule by mouth daily, Disp: , Rfl:   Cyanocobalamin (VITAMIN B 12) 500 MCG TABS, Take 1 tablet by mouth daily, Disp: , Rfl:   Vitamin D (CHOLECALCIFEROL) 1000 UNITS CAPS capsule, Take 1 capsule by mouth daily, Disp: , Rfl:   colchicine (COLCRYS) 0.6 MG tablet, Take 2 tabs day one, may repeat 1 tab as needed 1-2 hrs after first dose (max dose day one 1.8mg), then take 1 tab daily until gout resolves. (Patient not taking: Reported on 4/21/2025), Disp: 30 tablet, Rfl: 1  furosemide

## 2025-04-24 DIAGNOSIS — R60.9 SWELLING: ICD-10-CM

## 2025-04-24 RX ORDER — FUROSEMIDE 20 MG/1
20 TABLET ORAL DAILY PRN
Qty: 90 TABLET | Refills: 0 | Status: SHIPPED | OUTPATIENT
Start: 2025-04-24

## 2025-05-05 DIAGNOSIS — Z11.59 ENCOUNTER FOR HEPATITIS C SCREENING TEST FOR LOW RISK PATIENT: ICD-10-CM

## 2025-05-05 DIAGNOSIS — E89.0 POSTOPERATIVE HYPOTHYROIDISM: ICD-10-CM

## 2025-05-05 DIAGNOSIS — Z11.4 ENCOUNTER FOR SCREENING FOR HIV: ICD-10-CM

## 2025-05-05 LAB
HCV AB SERPL QL IA: NORMAL
T3FREE SERPL-MCNC: 2.2 PG/ML (ref 2.3–4.2)
T4 FREE SERPL-MCNC: 1.5 NG/DL (ref 0.9–1.8)
TSH SERPL DL<=0.005 MIU/L-ACNC: 0.9 UIU/ML (ref 0.27–4.2)

## 2025-05-06 ENCOUNTER — RESULTS FOLLOW-UP (OUTPATIENT)
Dept: FAMILY MEDICINE CLINIC | Age: 65
End: 2025-05-06

## 2025-05-06 LAB
HIV 1+2 AB+HIV1 P24 AG SERPL QL IA: NORMAL
HIV 2 AB SERPL QL IA: NORMAL
HIV1 AB SERPL QL IA: NORMAL
HIV1 P24 AG SERPL QL IA: NORMAL

## 2025-05-06 NOTE — TELEPHONE ENCOUNTER
PT  states that she does not have a thyroid it was removed years ago , aware of results, if you think she needs endocrinology she would like to know , but there hard to get in with .    no

## 2025-05-23 RX ORDER — SEMAGLUTIDE 1 MG/.5ML
INJECTION, SOLUTION SUBCUTANEOUS
Qty: 2 ML | Refills: 1 | OUTPATIENT
Start: 2025-05-23

## 2025-05-30 RX ORDER — LEVOTHYROXINE SODIUM 125 UG/1
125 TABLET ORAL DAILY
Qty: 90 TABLET | Refills: 0 | Status: SHIPPED | OUTPATIENT
Start: 2025-05-30

## 2025-06-02 RX ORDER — APIXABAN 5 MG/1
5 TABLET, FILM COATED ORAL 2 TIMES DAILY
Qty: 180 TABLET | Refills: 3 | Status: SHIPPED | OUTPATIENT
Start: 2025-06-02

## 2025-07-10 ENCOUNTER — TELEPHONE (OUTPATIENT)
Dept: FAMILY MEDICINE CLINIC | Age: 65
End: 2025-07-10

## 2025-07-10 DIAGNOSIS — E89.0 POSTOPERATIVE HYPOTHYROIDISM: Primary | ICD-10-CM

## 2025-07-10 NOTE — TELEPHONE ENCOUNTER
PT IS ASKING FOR REFERRAL FOR ENDOCRINOLOGY , SHE IS OPEN TO SEEING WHOEVER . CALL WHEN REFERRAL IS PLACED AND FAXED.

## 2025-07-11 NOTE — TELEPHONE ENCOUNTER
Liberty advised and states yes it is for her thyroid.   Order placed. She will contact Dr. Holden to schedule.

## 2025-07-16 NOTE — TELEPHONE ENCOUNTER
Verify patient's current dose: 1 mg  Is patient tolerating the current dose with no side effects: No side effects noted.   Has patient lost any weight since starting this medication: Yes  What is the patients current weight: 211.0 lb    If patient is taking this for DM/What are your current blood sugars running on average: No     Does patient want to titrate dose up if approved by provider: Yes.    Verify the correct pharmacy: [facility] in [place].   Patient is former patient of Dr. Bloom. Patient has a New to Provider appointment with Dr. Swanson on 8-. Patient was advised that Dr. Swanson may wait to titrate her dose until she has been seen and established with Dr. Swanson. Patient expressed understanding.

## 2025-07-22 RX ORDER — LISINOPRIL AND HYDROCHLOROTHIAZIDE 20; 25 MG/1; MG/1
1 TABLET ORAL DAILY
Qty: 90 TABLET | Refills: 0 | Status: SHIPPED | OUTPATIENT
Start: 2025-07-22

## 2025-07-28 DIAGNOSIS — R60.9 SWELLING: ICD-10-CM

## 2025-07-28 RX ORDER — FUROSEMIDE 20 MG/1
20 TABLET ORAL DAILY PRN
Qty: 90 TABLET | Refills: 0 | Status: SHIPPED | OUTPATIENT
Start: 2025-07-28

## 2025-07-30 RX ORDER — LISINOPRIL AND HYDROCHLOROTHIAZIDE 20; 25 MG/1; MG/1
1 TABLET ORAL DAILY
Qty: 90 TABLET | Refills: 0 | OUTPATIENT
Start: 2025-07-30

## 2025-08-19 SDOH — HEALTH STABILITY: PHYSICAL HEALTH: ON AVERAGE, HOW MANY MINUTES DO YOU ENGAGE IN EXERCISE AT THIS LEVEL?: PATIENT DECLINED

## 2025-08-19 SDOH — HEALTH STABILITY: PHYSICAL HEALTH
ON AVERAGE, HOW MANY DAYS PER WEEK DO YOU ENGAGE IN MODERATE TO STRENUOUS EXERCISE (LIKE A BRISK WALK)?: PATIENT DECLINED

## 2025-08-21 PROBLEM — D68.52 PROTHROMBIN GENE MUTATION: Status: ACTIVE | Noted: 2025-08-21

## 2025-08-21 PROBLEM — M10.9 GOUT: Status: ACTIVE | Noted: 2025-08-21

## 2025-08-21 PROBLEM — A09 TRAVELER'S DIARRHEA: Status: RESOLVED | Noted: 2024-06-18 | Resolved: 2025-08-21

## 2025-08-22 ENCOUNTER — OFFICE VISIT (OUTPATIENT)
Dept: FAMILY MEDICINE CLINIC | Age: 65
End: 2025-08-22

## 2025-08-22 VITALS
TEMPERATURE: 97.8 F | WEIGHT: 215.2 LBS | HEART RATE: 75 BPM | HEIGHT: 69 IN | BODY MASS INDEX: 31.87 KG/M2 | OXYGEN SATURATION: 97 % | DIASTOLIC BLOOD PRESSURE: 70 MMHG | SYSTOLIC BLOOD PRESSURE: 118 MMHG

## 2025-08-22 DIAGNOSIS — I10 ESSENTIAL HYPERTENSION: ICD-10-CM

## 2025-08-22 DIAGNOSIS — E66.01 MORBID OBESITY (HCC): ICD-10-CM

## 2025-08-22 DIAGNOSIS — M10.9 GOUT, UNSPECIFIED CAUSE, UNSPECIFIED CHRONICITY, UNSPECIFIED SITE: ICD-10-CM

## 2025-08-22 DIAGNOSIS — E89.0 POSTOPERATIVE HYPOTHYROIDISM: ICD-10-CM

## 2025-08-22 DIAGNOSIS — R25.1 TREMOR: ICD-10-CM

## 2025-08-22 DIAGNOSIS — G47.33 OSA (OBSTRUCTIVE SLEEP APNEA): Primary | ICD-10-CM

## 2025-08-22 DIAGNOSIS — E78.49 OTHER HYPERLIPIDEMIA: ICD-10-CM

## 2025-08-22 DIAGNOSIS — D68.52 PROTHROMBIN GENE MUTATION: ICD-10-CM

## 2025-08-22 DIAGNOSIS — M1A.9XX0 CHRONIC GOUT WITHOUT TOPHUS, UNSPECIFIED CAUSE, UNSPECIFIED SITE: ICD-10-CM

## 2025-08-22 DIAGNOSIS — D72.829 LEUKOCYTOSIS, UNSPECIFIED TYPE: ICD-10-CM

## 2025-08-22 DIAGNOSIS — Z86.718 HX OF VENOUS THROMBOEMBOLIC DISEASE: ICD-10-CM

## 2025-08-22 RX ORDER — APIXABAN 2.5 MG/1
2.5 TABLET, FILM COATED ORAL 2 TIMES DAILY
COMMUNITY
Start: 2025-06-15

## 2025-08-22 ASSESSMENT — ENCOUNTER SYMPTOMS
NAUSEA: 0
WHEEZING: 0
VOMITING: 0
BLOOD IN STOOL: 0
SHORTNESS OF BREATH: 0
COUGH: 0

## 2025-08-25 ENCOUNTER — OFFICE VISIT (OUTPATIENT)
Age: 65
End: 2025-08-25
Payer: COMMERCIAL

## 2025-08-25 VITALS
DIASTOLIC BLOOD PRESSURE: 68 MMHG | OXYGEN SATURATION: 96 % | HEIGHT: 69 IN | SYSTOLIC BLOOD PRESSURE: 128 MMHG | HEART RATE: 78 BPM | BODY MASS INDEX: 31.78 KG/M2 | WEIGHT: 214.6 LBS

## 2025-08-25 DIAGNOSIS — E89.0 POSTOPERATIVE HYPOTHYROIDISM: Primary | ICD-10-CM

## 2025-08-25 DIAGNOSIS — E89.0 POSTOPERATIVE HYPOTHYROIDISM: ICD-10-CM

## 2025-08-25 LAB — TSH SERPL DL<=0.005 MIU/L-ACNC: 1.28 UIU/ML (ref 0.27–4.2)

## 2025-08-25 PROCEDURE — 99204 OFFICE O/P NEW MOD 45 MIN: CPT | Performed by: HOSPITALIST

## 2025-08-25 PROCEDURE — 3078F DIAST BP <80 MM HG: CPT | Performed by: HOSPITALIST

## 2025-08-25 PROCEDURE — 3074F SYST BP LT 130 MM HG: CPT | Performed by: HOSPITALIST

## 2025-08-25 RX ORDER — ALLOPURINOL 100 MG/1
100 TABLET ORAL DAILY
Qty: 90 TABLET | Refills: 1 | Status: SHIPPED | OUTPATIENT
Start: 2025-08-25

## 2025-08-28 ENCOUNTER — TELEMEDICINE (OUTPATIENT)
Dept: ENDOCRINOLOGY | Age: 65
End: 2025-08-28
Payer: COMMERCIAL

## 2025-08-28 DIAGNOSIS — E89.0 POSTOPERATIVE HYPOTHYROIDISM: Primary | ICD-10-CM

## 2025-08-28 PROCEDURE — 99213 OFFICE O/P EST LOW 20 MIN: CPT | Performed by: HOSPITALIST

## 2025-09-02 RX ORDER — LEVOTHYROXINE SODIUM 125 UG/1
125 TABLET ORAL DAILY
Qty: 90 TABLET | Refills: 0 | Status: SHIPPED | OUTPATIENT
Start: 2025-09-02